# Patient Record
Sex: MALE | Race: WHITE | Employment: OTHER | URBAN - METROPOLITAN AREA
[De-identification: names, ages, dates, MRNs, and addresses within clinical notes are randomized per-mention and may not be internally consistent; named-entity substitution may affect disease eponyms.]

---

## 2020-08-01 ENCOUNTER — HOSPITAL ENCOUNTER (INPATIENT)
Age: 85
LOS: 11 days | Discharge: NURSING, REHAB OR LONG-TERM CARE | DRG: 871 | End: 2020-08-13
Attending: INTERNAL MEDICINE | Admitting: INTERNAL MEDICINE
Payer: MEDICARE

## 2020-08-01 PROCEDURE — 99285 EMERGENCY DEPT VISIT HI MDM: CPT

## 2020-08-02 ENCOUNTER — APPOINTMENT (OUTPATIENT)
Dept: CT IMAGING | Age: 85
DRG: 871 | End: 2020-08-02
Payer: MEDICARE

## 2020-08-02 ENCOUNTER — APPOINTMENT (OUTPATIENT)
Dept: GENERAL RADIOLOGY | Age: 85
DRG: 871 | End: 2020-08-02
Payer: MEDICARE

## 2020-08-02 PROBLEM — R06.02 SOB (SHORTNESS OF BREATH): Status: ACTIVE | Noted: 2020-08-02

## 2020-08-02 LAB
ADENOVIRUS DETECTION BY PCR: NOT DETECTED
ALBUMIN SERPL-MCNC: 3.7 GM/DL (ref 3.4–5)
ALBUMIN SERPL-MCNC: 4 GM/DL (ref 3.4–5)
ALP BLD-CCNC: 102 IU/L (ref 40–129)
ALP BLD-CCNC: 109 IU/L (ref 40–128)
ALT SERPL-CCNC: 26 U/L (ref 10–40)
ALT SERPL-CCNC: 29 U/L (ref 10–40)
AMMONIA: 43 UMOL/L (ref 16–60)
ANION GAP SERPL CALCULATED.3IONS-SCNC: 15 MMOL/L (ref 4–16)
ANION GAP SERPL CALCULATED.3IONS-SCNC: 18 MMOL/L (ref 4–16)
APTT: 26.6 SECONDS (ref 25.1–37.1)
AST SERPL-CCNC: 58 IU/L (ref 15–37)
AST SERPL-CCNC: 84 IU/L (ref 15–37)
ATYPICAL LYMPHOCYTE ABSOLUTE COUNT: ABNORMAL
BACTERIA: NEGATIVE /HPF
BANDED NEUTROPHILS ABSOLUTE COUNT: 0.78 K/CU MM
BANDED NEUTROPHILS RELATIVE PERCENT: 4 % (ref 5–11)
BASOPHILS ABSOLUTE: 0 K/CU MM
BASOPHILS RELATIVE PERCENT: 0.2 % (ref 0–1)
BILIRUB SERPL-MCNC: 1.7 MG/DL (ref 0–1)
BILIRUB SERPL-MCNC: 2.1 MG/DL (ref 0–1)
BILIRUBIN URINE: NEGATIVE MG/DL
BLOOD, URINE: ABNORMAL
BORDETELLA PARAPERTUSSIS BY PCR: NOT DETECTED
BORDETELLA PERTUSSIS PCR: NOT DETECTED
BUN BLDV-MCNC: 38 MG/DL (ref 6–23)
BUN BLDV-MCNC: 38 MG/DL (ref 6–23)
CALCIUM SERPL-MCNC: 9.2 MG/DL (ref 8.3–10.6)
CALCIUM SERPL-MCNC: 9.6 MG/DL (ref 8.3–10.6)
CHLAMYDOPHILA PNEUMONIA PCR: NOT DETECTED
CHLORIDE BLD-SCNC: 101 MMOL/L (ref 99–110)
CHLORIDE BLD-SCNC: 101 MMOL/L (ref 99–110)
CLARITY: ABNORMAL
CO2: 22 MMOL/L (ref 21–32)
CO2: 25 MMOL/L (ref 21–32)
COLOR: ABNORMAL
CORONAVIRUS 229E PCR: NOT DETECTED
CORONAVIRUS HKU1 PCR: NOT DETECTED
CORONAVIRUS NL63 PCR: NOT DETECTED
CORONAVIRUS OC43 PCR: NOT DETECTED
CREAT SERPL-MCNC: 2 MG/DL (ref 0.9–1.3)
CREAT SERPL-MCNC: 2 MG/DL (ref 0.9–1.3)
D DIMER: 2777 NG/ML(DDU)
DIFFERENTIAL TYPE: ABNORMAL
DIFFERENTIAL TYPE: ABNORMAL
EOSINOPHILS ABSOLUTE: 0 K/CU MM
EOSINOPHILS RELATIVE PERCENT: 0 % (ref 0–3)
FERRITIN: 237 NG/ML (ref 30–400)
FIBRINOGEN LEVEL: 837 MG/DL (ref 196.9–442.1)
GFR AFRICAN AMERICAN: 38 ML/MIN/1.73M2
GFR AFRICAN AMERICAN: 38 ML/MIN/1.73M2
GFR NON-AFRICAN AMERICAN: 32 ML/MIN/1.73M2
GFR NON-AFRICAN AMERICAN: 32 ML/MIN/1.73M2
GLUCOSE BLD-MCNC: 113 MG/DL (ref 70–99)
GLUCOSE BLD-MCNC: 123 MG/DL (ref 70–99)
GLUCOSE BLD-MCNC: 125 MG/DL (ref 70–99)
GLUCOSE, URINE: NEGATIVE MG/DL
HCT VFR BLD CALC: 47.2 % (ref 42–52)
HCT VFR BLD CALC: 50.4 % (ref 42–52)
HEMOGLOBIN: 14.8 GM/DL (ref 13.5–18)
HEMOGLOBIN: 15.6 GM/DL (ref 13.5–18)
HIGH SENSITIVE C-REACTIVE PROTEIN: >300 MG/L
HUMAN METAPNEUMOVIRUS PCR: NOT DETECTED
HYPOCHROMIA: ABNORMAL
IMMATURE NEUTROPHIL %: 0.5 % (ref 0–0.43)
INFLUENZA A BY PCR: NOT DETECTED
INFLUENZA A H1 (2009) PCR: NOT DETECTED
INFLUENZA A H1 PANDEMIC PCR: NOT DETECTED
INFLUENZA A H3 PCR: NOT DETECTED
INFLUENZA B BY PCR: NOT DETECTED
INR BLD: 1.11 INDEX
KETONES, URINE: NEGATIVE MG/DL
LACTATE DEHYDROGENASE: 273 IU/L (ref 120–246)
LACTATE: 3.1 MMOL/L (ref 0.4–2)
LACTIC ACID, SEPSIS: 2.2 MMOL/L (ref 0.5–1.9)
LEGIONELLA URINARY AG: NEGATIVE
LEUKOCYTE ESTERASE, URINE: NEGATIVE
LIPASE: 15 IU/L (ref 13–60)
LYMPHOCYTES ABSOLUTE: 0.7 K/CU MM
LYMPHOCYTES ABSOLUTE: 1.4 K/CU MM
LYMPHOCYTES RELATIVE PERCENT: 4 % (ref 24–44)
LYMPHOCYTES RELATIVE PERCENT: 7 % (ref 24–44)
MCH RBC QN AUTO: 28.1 PG (ref 27–31)
MCH RBC QN AUTO: 28.4 PG (ref 27–31)
MCHC RBC AUTO-ENTMCNC: 31 % (ref 32–36)
MCHC RBC AUTO-ENTMCNC: 31.4 % (ref 32–36)
MCV RBC AUTO: 90.6 FL (ref 78–100)
MCV RBC AUTO: 90.8 FL (ref 78–100)
MONOCYTES ABSOLUTE: 1.8 K/CU MM
MONOCYTES ABSOLUTE: 1.9 K/CU MM
MONOCYTES RELATIVE PERCENT: 10.7 % (ref 0–4)
MONOCYTES RELATIVE PERCENT: 9 % (ref 0–4)
MUCUS: ABNORMAL HPF
MYCOPLASMA PNEUMONIAE PCR: NOT DETECTED
NITRITE URINE, QUANTITATIVE: NEGATIVE
NUCLEATED RBC %: 0 %
PARAINFLUENZA 1 PCR: NOT DETECTED
PARAINFLUENZA 2 PCR: NOT DETECTED
PARAINFLUENZA 3 PCR: NOT DETECTED
PARAINFLUENZA 4 PCR: NOT DETECTED
PDW BLD-RTO: 14.7 % (ref 11.7–14.9)
PDW BLD-RTO: 14.8 % (ref 11.7–14.9)
PH, URINE: 5 (ref 5–8)
PLATELET # BLD: 162 K/CU MM (ref 140–440)
PLATELET # BLD: 164 K/CU MM (ref 140–440)
PMV BLD AUTO: 10.6 FL (ref 7.5–11.1)
PMV BLD AUTO: 10.7 FL (ref 7.5–11.1)
POTASSIUM SERPL-SCNC: 4.3 MMOL/L (ref 3.5–5.1)
POTASSIUM SERPL-SCNC: 4.7 MMOL/L (ref 3.5–5.1)
PRO-BNP: 542.4 PG/ML
PROCALCITONIN: 1.3
PROTEIN UA: 30 MG/DL
PROTHROMBIN TIME: 13.4 SECONDS (ref 11.7–14.5)
RBC # BLD: 5.21 M/CU MM (ref 4.6–6.2)
RBC # BLD: 5.55 M/CU MM (ref 4.6–6.2)
RBC URINE: 1 /HPF (ref 0–3)
RHINOVIRUS ENTEROVIRUS PCR: NOT DETECTED
RSV PCR: NOT DETECTED
SEGMENTED NEUTROPHILS ABSOLUTE COUNT: 15.1 K/CU MM
SEGMENTED NEUTROPHILS ABSOLUTE COUNT: 15.5 K/CU MM
SEGMENTED NEUTROPHILS RELATIVE PERCENT: 80 % (ref 36–66)
SEGMENTED NEUTROPHILS RELATIVE PERCENT: 84.6 % (ref 36–66)
SODIUM BLD-SCNC: 141 MMOL/L (ref 135–145)
SODIUM BLD-SCNC: 141 MMOL/L (ref 135–145)
SPECIFIC GRAVITY UA: 1.02 (ref 1–1.03)
SPHEROCYTES: ABNORMAL
STREP PNEUMONIAE ANTIGEN: NORMAL
TOTAL CK: 1507 IU/L (ref 38–174)
TOTAL IMMATURE NEUTOROPHIL: 0.09 K/CU MM
TOTAL NUCLEATED RBC: 0 K/CU MM
TOTAL PROTEIN: 8 GM/DL (ref 6.4–8.2)
TOTAL PROTEIN: 8.3 GM/DL (ref 6.4–8.2)
TRICHOMONAS: ABNORMAL /HPF
TROPONIN T: <0.01 NG/ML
TROPONIN T: <0.01 NG/ML
UROBILINOGEN, URINE: 2 MG/DL (ref 0.2–1)
WBC # BLD: 17.9 K/CU MM (ref 4–10.5)
WBC # BLD: 19.5 K/CU MM (ref 4–10.5)
WBC UA: 3 /HPF (ref 0–2)
YEAST: ABNORMAL /HPF

## 2020-08-02 PROCEDURE — 80053 COMPREHEN METABOLIC PANEL: CPT

## 2020-08-02 PROCEDURE — 84484 ASSAY OF TROPONIN QUANT: CPT

## 2020-08-02 PROCEDURE — 70450 CT HEAD/BRAIN W/O DYE: CPT

## 2020-08-02 PROCEDURE — 82962 GLUCOSE BLOOD TEST: CPT

## 2020-08-02 PROCEDURE — 87486 CHLMYD PNEUM DNA AMP PROBE: CPT

## 2020-08-02 PROCEDURE — 6360000002 HC RX W HCPCS: Performed by: INTERNAL MEDICINE

## 2020-08-02 PROCEDURE — 87899 AGENT NOS ASSAY W/OPTIC: CPT

## 2020-08-02 PROCEDURE — 96365 THER/PROPH/DIAG IV INF INIT: CPT

## 2020-08-02 PROCEDURE — 2580000003 HC RX 258: Performed by: PHYSICIAN ASSISTANT

## 2020-08-02 PROCEDURE — 2580000003 HC RX 258: Performed by: INTERNAL MEDICINE

## 2020-08-02 PROCEDURE — 94761 N-INVAS EAR/PLS OXIMETRY MLT: CPT

## 2020-08-02 PROCEDURE — 84145 PROCALCITONIN (PCT): CPT

## 2020-08-02 PROCEDURE — 2060000000 HC ICU INTERMEDIATE R&B

## 2020-08-02 PROCEDURE — 85384 FIBRINOGEN ACTIVITY: CPT

## 2020-08-02 PROCEDURE — 82140 ASSAY OF AMMONIA: CPT

## 2020-08-02 PROCEDURE — 99222 1ST HOSP IP/OBS MODERATE 55: CPT | Performed by: INTERNAL MEDICINE

## 2020-08-02 PROCEDURE — 85610 PROTHROMBIN TIME: CPT

## 2020-08-02 PROCEDURE — 87040 BLOOD CULTURE FOR BACTERIA: CPT

## 2020-08-02 PROCEDURE — 85025 COMPLETE CBC W/AUTO DIFF WBC: CPT

## 2020-08-02 PROCEDURE — 71045 X-RAY EXAM CHEST 1 VIEW: CPT

## 2020-08-02 PROCEDURE — 82728 ASSAY OF FERRITIN: CPT

## 2020-08-02 PROCEDURE — 82805 BLOOD GASES W/O2 SATURATION: CPT

## 2020-08-02 PROCEDURE — 6360000002 HC RX W HCPCS: Performed by: PHYSICIAN ASSISTANT

## 2020-08-02 PROCEDURE — U0002 COVID-19 LAB TEST NON-CDC: HCPCS

## 2020-08-02 PROCEDURE — 93010 ELECTROCARDIOGRAM REPORT: CPT | Performed by: INTERNAL MEDICINE

## 2020-08-02 PROCEDURE — 96367 TX/PROPH/DG ADDL SEQ IV INF: CPT

## 2020-08-02 PROCEDURE — 85027 COMPLETE CBC AUTOMATED: CPT

## 2020-08-02 PROCEDURE — 83605 ASSAY OF LACTIC ACID: CPT

## 2020-08-02 PROCEDURE — 85730 THROMBOPLASTIN TIME PARTIAL: CPT

## 2020-08-02 PROCEDURE — 87798 DETECT AGENT NOS DNA AMP: CPT

## 2020-08-02 PROCEDURE — 83615 LACTATE (LD) (LDH) ENZYME: CPT

## 2020-08-02 PROCEDURE — 87633 RESP VIRUS 12-25 TARGETS: CPT

## 2020-08-02 PROCEDURE — 85007 BL SMEAR W/DIFF WBC COUNT: CPT

## 2020-08-02 PROCEDURE — 93005 ELECTROCARDIOGRAM TRACING: CPT | Performed by: PHYSICIAN ASSISTANT

## 2020-08-02 PROCEDURE — 83690 ASSAY OF LIPASE: CPT

## 2020-08-02 PROCEDURE — 87581 M.PNEUMON DNA AMP PROBE: CPT

## 2020-08-02 PROCEDURE — 85379 FIBRIN DEGRADATION QUANT: CPT

## 2020-08-02 PROCEDURE — 87449 NOS EACH ORGANISM AG IA: CPT

## 2020-08-02 PROCEDURE — 83880 ASSAY OF NATRIURETIC PEPTIDE: CPT

## 2020-08-02 PROCEDURE — 81001 URINALYSIS AUTO W/SCOPE: CPT

## 2020-08-02 PROCEDURE — 86141 C-REACTIVE PROTEIN HS: CPT

## 2020-08-02 PROCEDURE — 82550 ASSAY OF CK (CPK): CPT

## 2020-08-02 PROCEDURE — 96361 HYDRATE IV INFUSION ADD-ON: CPT

## 2020-08-02 RX ORDER — SODIUM CHLORIDE 0.9 % (FLUSH) 0.9 %
10 SYRINGE (ML) INJECTION PRN
Status: DISCONTINUED | OUTPATIENT
Start: 2020-08-02 | End: 2020-08-13 | Stop reason: HOSPADM

## 2020-08-02 RX ORDER — LORAZEPAM 1 MG/1
2 TABLET ORAL NIGHTLY
Status: ON HOLD | COMMUNITY
End: 2020-08-13 | Stop reason: HOSPADM

## 2020-08-02 RX ORDER — 0.9 % SODIUM CHLORIDE 0.9 %
500 INTRAVENOUS SOLUTION INTRAVENOUS ONCE
Status: COMPLETED | OUTPATIENT
Start: 2020-08-02 | End: 2020-08-02

## 2020-08-02 RX ORDER — SODIUM CHLORIDE 9 MG/ML
INJECTION, SOLUTION INTRAVENOUS CONTINUOUS
Status: DISCONTINUED | OUTPATIENT
Start: 2020-08-02 | End: 2020-08-05

## 2020-08-02 RX ORDER — ONDANSETRON 2 MG/ML
4 INJECTION INTRAMUSCULAR; INTRAVENOUS EVERY 6 HOURS PRN
Status: DISCONTINUED | OUTPATIENT
Start: 2020-08-02 | End: 2020-08-13 | Stop reason: HOSPADM

## 2020-08-02 RX ORDER — ACETAMINOPHEN 650 MG/1
650 SUPPOSITORY RECTAL EVERY 6 HOURS PRN
Status: DISCONTINUED | OUTPATIENT
Start: 2020-08-02 | End: 2020-08-08

## 2020-08-02 RX ORDER — ACETAMINOPHEN 325 MG/1
650 TABLET ORAL EVERY 6 HOURS PRN
Status: DISCONTINUED | OUTPATIENT
Start: 2020-08-02 | End: 2020-08-04

## 2020-08-02 RX ORDER — POLYETHYLENE GLYCOL 3350 17 G/17G
17 POWDER, FOR SOLUTION ORAL DAILY PRN
Status: DISCONTINUED | OUTPATIENT
Start: 2020-08-02 | End: 2020-08-13 | Stop reason: HOSPADM

## 2020-08-02 RX ORDER — SODIUM CHLORIDE 0.9 % (FLUSH) 0.9 %
10 SYRINGE (ML) INJECTION EVERY 12 HOURS SCHEDULED
Status: DISCONTINUED | OUTPATIENT
Start: 2020-08-02 | End: 2020-08-13 | Stop reason: HOSPADM

## 2020-08-02 RX ORDER — LORAZEPAM 1 MG/1
1 TABLET ORAL 2 TIMES DAILY
Status: ON HOLD | COMMUNITY
End: 2020-08-13 | Stop reason: HOSPADM

## 2020-08-02 RX ORDER — ACETAMINOPHEN 650 MG/1
650 SUPPOSITORY RECTAL EVERY 6 HOURS PRN
Status: DISCONTINUED | OUTPATIENT
Start: 2020-08-02 | End: 2020-08-04

## 2020-08-02 RX ORDER — PROMETHAZINE HYDROCHLORIDE 25 MG/1
12.5 TABLET ORAL EVERY 6 HOURS PRN
Status: DISCONTINUED | OUTPATIENT
Start: 2020-08-02 | End: 2020-08-13 | Stop reason: HOSPADM

## 2020-08-02 RX ORDER — ACETAMINOPHEN 325 MG/1
650 TABLET ORAL EVERY 6 HOURS PRN
Status: DISCONTINUED | OUTPATIENT
Start: 2020-08-02 | End: 2020-08-08

## 2020-08-02 RX ADMIN — SODIUM CHLORIDE, PRESERVATIVE FREE 10 ML: 5 INJECTION INTRAVENOUS at 21:00

## 2020-08-02 RX ADMIN — SODIUM CHLORIDE: 9 INJECTION, SOLUTION INTRAVENOUS at 01:32

## 2020-08-02 RX ADMIN — CEFTRIAXONE SODIUM 1 G: 1 INJECTION, POWDER, FOR SOLUTION INTRAMUSCULAR; INTRAVENOUS at 02:49

## 2020-08-02 RX ADMIN — SODIUM CHLORIDE, PRESERVATIVE FREE 10 ML: 5 INJECTION INTRAVENOUS at 09:21

## 2020-08-02 RX ADMIN — DEXTROSE MONOHYDRATE 500 MG: 50 INJECTION, SOLUTION INTRAVENOUS at 02:59

## 2020-08-02 RX ADMIN — SODIUM CHLORIDE 500 ML: 9 INJECTION, SOLUTION INTRAVENOUS at 04:58

## 2020-08-02 RX ADMIN — SODIUM CHLORIDE: 9 INJECTION, SOLUTION INTRAVENOUS at 12:05

## 2020-08-02 RX ADMIN — ENOXAPARIN SODIUM 30 MG: 30 INJECTION SUBCUTANEOUS at 09:21

## 2020-08-02 ASSESSMENT — PAIN SCALES - GENERAL: PAINLEVEL_OUTOF10: 0

## 2020-08-02 NOTE — ED NOTES
Patient's linen and clothing changed due to incontinent urinary episode prior to arrival. Patient's oxygen level down to 69% on room air. Non rebreather mask placed at 15 L/min. Patient's saturation at 100%. Notified Zoraida DIAZ and Dr. Salbador Vale.       Esequiel Pedro RN  08/02/20 4817

## 2020-08-02 NOTE — ED NOTES
Placido Valdez, son, [phone]. Please call with admission info and update on POC.      Helena Gray RN  08/02/20 7933

## 2020-08-02 NOTE — H&P
per session: Not on file    Stress: Not on file   Relationships    Social connections     Talks on phone: Not on file     Gets together: Not on file     Attends Oriental orthodox service: Not on file     Active member of club or organization: Not on file     Attends meetings of clubs or organizations: Not on file     Relationship status: Not on file    Intimate partner violence     Fear of current or ex partner: Not on file     Emotionally abused: Not on file     Physically abused: Not on file     Forced sexual activity: Not on file   Other Topics Concern    Not on file   Social History Narrative    Not on file       MEDICATIONS   Medications Prior to Admission  Not in a hospital admission. Current Medications  Current Facility-Administered Medications   Medication Dose Route Frequency Provider Last Rate Last Dose    0.9 % sodium chloride infusion   Intravenous Continuous Sara Feliciano PA-C 100 mL/hr at 08/02/20 0132      0.9 % sodium chloride bolus  500 mL Intravenous Once Sara Feliciano PA-C         Current Outpatient Medications   Medication Sig Dispense Refill    cloNIDine (CATAPRES) 0.1 MG tablet Take 1 tablet by mouth every 6 hours as needed (for B/P systolic > 749). 60 tablet 3    pantoprazole (PROTONIX) 40 MG tablet Take 1 tablet by mouth every morning (before breakfast). 30 tablet 3    QUEtiapine (SEROQUEL) 100 MG tablet Take 100 mg by mouth 2 times daily.  ibuprofen (ADVIL;MOTRIN) 400 MG tablet Take 400 mg by mouth 2 times daily as needed for Pain.  metoprolol (TOPROL-XL) 25 MG XL tablet Take 25 mg by mouth daily.  mirtazapine (REMERON) 45 MG tablet Take 45 mg by mouth nightly. Allergies  No Known Allergies    REVIEW OF SYSTEMS   Within above limitations. 14 point review of systems reviewed. Pertinent positive or negative as per HPI or otherwise negative per 14 point systems review.       PHYSICAL EXAM     Wt Readings from Last 3 Encounters:   08/02/20 180 lb

## 2020-08-02 NOTE — ED NOTES
Report given to [employer]. Patient is dry and in a gown. Patient has a crowley.      Palomo Henry RN  08/02/20 7417

## 2020-08-02 NOTE — PROGRESS NOTES
Pt arrived to unit escorted by a nurse. Pt transported to room and skin assessment complete with maría DORANTES. Pt has unopened blisters on buttock with and pink and purple unstagable wound to bilateral inner buttocks. There is a  Healed surgigal scar to right hip and scattered bruising noted to upper body.

## 2020-08-02 NOTE — ED NOTES
Per Kulwant DIAZ, wean patient off non rebreather. Patient placed on 6 L/min NC. Oxygen saturation 100% at this time.      Nikkie Lake RN  08/02/20 9739

## 2020-08-02 NOTE — CONSULTS
Pulmonary Consult Note      Reason for Consult: SOB  Requesting Physician: Dr. Natividad Meyers  Subjective:   CHIEF COMPLAINT :SOB    Patient Active Problem List    Diagnosis Date Noted    SOB (shortness of breath) 23/72/4081    Metabolic encephalopathy 97/77/2030    Acute renal failure ([facility] [address] 12/03/2014    Pneumonia due to organism 12/02/2014     Overview Note:     Replacing Inactive Diagnoses      ARF (acute respiratory failure) ([facility] [address] 12/02/2014    Essential hypertension, benign 12/02/2014    Depressive disorder, not elsewhere classified 12/02/2014        HPI:                The patient is a 80 y.o. male with significant past medical history of Anxiety, Depression, HTN  presents with complaints of SOB x 1 day. He was brought to the ED and had a CXR which showed suspected RUL Pneumonia. He had a CT head for AMS and it showed no acute abnormality. He is getting abx and nebs. He has a remote h/o smoking    Past Medical History:      Diagnosis Date    Anxiety     Depression     Hypertension     Nerves       Past Surgical History:        Procedure Laterality Date    JOINT REPLACEMENT      right hip     Current Medications:     sodium chloride flush  10 mL Intravenous 2 times per day    enoxaparin  30 mg Subcutaneous Daily    [START ON 8/3/2020] cefTRIAXone (ROCEPHIN) IV  1 g Intravenous Q24H    [START ON 8/3/2020] azithromycin  500 mg Intravenous Q24H     Allergies:    Social History:    TOBACCO:   reports that he quit smoking about 45 years ago. His smokeless tobacco use includes chew. ETOH:   reports no history of alcohol use. Patient currently lives independently    Family History:       Problem Relation Age of Onset    Cancer Mother     Cancer Father     Cancer Sister        REVIEW OF SYSTEMS:    CONSTITUTIONAL:  negative for fevers, chills, diaphoresis, activity change, appetite change, fatigue, night sweats and unexpected weight change.    EYES:  negative for blurred vision, eye discharge, visual disturbance and icterus  HEENT:  negative for hearing loss, tinnitus, ear drainage, sinus pressure, nasal congestion, epistaxis and snoring  RESPIRATORY:  See HPI  CARDIOVASCULAR:  negative for chest pain, palpitations, exertional chest pressure/discomfort, edema, syncope  GASTROINTESTINAL:  negative for nausea, vomiting, diarrhea, constipation, blood in stool and abdominal pain  GENITOURINARY:  negative for frequency, dysuria, urinary incontinence, decreased urine volume, and hematuria  HEMATOLOGIC/LYMPHATIC:  negative for easy bruising, bleeding and lymphadenopathy  ALLERGIC/IMMUNOLOGIC:  negative for recurrent infections, angioedema, anaphylaxis and drug reactions  ENDOCRINE:  negative for weight changes and diabetic symptoms including polyuria, polydipsia and polyphagia  MUSCULOSKELETAL:  negative for  pain, joint swelling, decreased range of motion and muscle weakness  NEUROLOGICAL:  negative for headaches, slurred speech, unilateral weakness  PSYCHIATRIC/BEHAVIORAL: negative for hallucinations, behavioral problems, confusion and agitation. Objective:   PHYSICAL EXAM:      VITALS:  BP (!) 124/58   Pulse 71   Temp 99 °F (37.2 °C) (Oral)   Resp 24   Ht 5' 8\" (1.727 m)   Wt 180 lb (81.6 kg)   SpO2 94%   BMI 27.37 kg/m²   24HR INTAKE/OUTPUT:      Intake/Output Summary (Last 24 hours) at 2020 1422  Last data filed at 2020 0612  Gross per 24 hour   Intake --   Output 300 ml   Net -300 ml     CURRENT PULSE OXIMETRY:  SpO2: 94 %  24HR PULSE OXIMETRY RANGE:  SpO2  Av.2 %  Min: 77 %  Max: 100 %    CONSTITUTIONAL:  awake, alert, cooperative, no apparent distress, and appears stated age  NECK:  Supple, symmetrical, trachea midline, no adenopathy, thyroid symmetric, not enlarged and no tenderness, skin normal  LUNGS:  Occasional basal crackles  CARDIOVASCULAR:  normal S1 and S2, no edema and no JVD  ABDOMEN:  normal bowel sounds, non-distended and no masses palpated, and no tenderness to palpation. No hepatospleenomegaly  LYMPHADENOPATHY:  no axillary or supraclavicular adenopathy. No cervical adnenopathy  PSYCHIATRIC: Oriented to person place and time. No obvious depression or anxiety. MUSCULOSKELETAL: No obvious misalignment or effusion of the joints. No clubbing, cyanosis of the digits. SKIN:  normal skin color, texture, turgor and no redness, warmth, or swelling. No palpable nodules    DATA:    Old records have been reviewed  CBC with Differential:    Lab Results   Component Value Date    WBC 17.9 08/02/2020    RBC 5.21 08/02/2020    HGB 14.8 08/02/2020    HCT 47.2 08/02/2020     08/02/2020    MCV 90.6 08/02/2020    MCH 28.4 08/02/2020    MCHC 31.4 08/02/2020    RDW 14.8 08/02/2020    SEGSPCT 84.6 08/02/2020    BANDSPCT 4 08/02/2020    LYMPHOPCT 4.0 08/02/2020    MONOPCT 10.7 08/02/2020    EOSPCT 5.3 11/11/2011    BASOPCT 0.2 08/02/2020    MONOSABS 1.9 08/02/2020    LYMPHSABS 0.7 08/02/2020    EOSABS 0.0 08/02/2020    BASOSABS 0.0 08/02/2020    DIFFTYPE AUTOMATED DIFFERENTIAL 08/02/2020     BMP:    Lab Results   Component Value Date     08/02/2020    K 4.3 08/02/2020     08/02/2020    CO2 22 08/02/2020    BUN 38 08/02/2020    CREATININE 2.0 08/02/2020    CALCIUM 9.2 08/02/2020    GFRAA 38 08/02/2020    LABGLOM 32 08/02/2020    GLUCOSE 125 08/02/2020     Hepatic Function Panel:    Lab Results   Component Value Date    ALKPHOS 102 08/02/2020    ALT 29 08/02/2020    AST 84 08/02/2020    PROT 8.0 08/02/2020    PROT 6.4 10/24/2012    BILITOT 1.7 08/02/2020    BILIDIR 0.1 12/12/2012    IBILI 0.2 12/12/2012     ABG:    Lab Results   Component Value Date    WEW3BZP 21.0 12/05/2014    NWK9VFJ 38.0 12/05/2014    PO2ART 104 12/05/2014       Cultures:   Pending    Radiology Review:        1. Questionable right upper lobe airspace opacity could be related to    summation of overlying tissues but could also be seen with pneumonia. 2. Bibasilar atelectasis and/or scarring. 3. Mild cardiomegaly. Assessment/Plan       Patient Active Problem List    Diagnosis Date Noted    SOB (shortness of breath) 58/46/8313    Metabolic encephalopathy 44/86/3003    Acute renal failure ([facility] [address] 12/03/2014    Pneumonia due to organism 12/02/2014     Overview Note:     Replacing Inactive Diagnoses      ARF (acute respiratory failure) ([facility] [address] 12/02/2014    Essential hypertension, benign 12/02/2014    Depressive disorder, not elsewhere classified 12/02/2014     RONALDO  ? RUL Pneumonia  Leukocytosis  R/o COVID      PLAN  1. Abx  2. Await COVID  3. Inhalers  4. F/u C&S  5. DVT and GI Prophylaxis  6.  C/w present management      Electronically signed by Karely Hwang MD on 8/2/2020 at 2:22 PM

## 2020-08-02 NOTE — ED PROVIDER NOTES
Triage Chief Complaint:   Fatigue and Cough    [place]:  Today in the ED I had the pleasure of caring for Luz Maria Webb who is a 80 y.o. male that presents today to the emergency department brought in by EMS. Context is patient was found sitting in his chair soiled in his pants. Hypoxic upon arrival.  Very lethargic. Patient himself denies any pain. However then when I asked him if he has any pain he points to his epigastric region. Patient does have a past medical history of hypertension, pneumonia, renal failure acute. Metabolic encephalopathy in the past.   Family came over and noticed how weak he was and called ems to check on him, they had noticed he hadnt taken his meds.    ROS:  REVIEW OF SYSTEMS    At least 10 systems reviewed      All other review of systems are negative  See HPI and nursing notes for additional information       Past Medical History:   Diagnosis Date    Anxiety     Depression     Hypertension     Nerves      Past Surgical History:   Procedure Laterality Date    JOINT REPLACEMENT      right hip     Family History   Problem Relation Age of Onset    Cancer Mother     Cancer Father     Cancer Sister      Social History     Socioeconomic History    Marital status:      Spouse name: Not on file    Number of children: 3    Years of education: Not on file    Highest education level: Not on file   Occupational History    Not on file   Social Needs    Financial resource strain: Not on file    Food insecurity     Worry: Not on file     Inability: Not on file    Transportation needs     Medical: Not on file     Non-medical: Not on file   Tobacco Use    Smoking status: Former Smoker     Last attempt to quit: 1974     Years since quittin.6    Smokeless tobacco: Current User     Types: Chew   Substance and Sexual Activity    Alcohol use: No     Comment: used to drimk on weekends stopped 2 years ago    Drug use: No    Sexual activity: Not on file   Lifestyle    Physical activity     Days per week: Not on file     Minutes per session: Not on file    Stress: Not on file   Relationships    Social connections     Talks on phone: Not on file     Gets together: Not on file     Attends Anabaptism service: Not on file     Active member of club or organization: Not on file     Attends meetings of clubs or organizations: Not on file     Relationship status: Not on file    Intimate partner violence     Fear of current or ex partner: Not on file     Emotionally abused: Not on file     Physically abused: Not on file     Forced sexual activity: Not on file   Other Topics Concern    Not on file   Social History Narrative    Not on file     Current Facility-Administered Medications   Medication Dose Route Frequency Provider Last Rate Last Dose    0.9 % sodium chloride infusion   Intravenous Continuous Tristan Andrade PA-C 100 mL/hr at 08/02/20 0132      0.9 % sodium chloride bolus  500 mL Intravenous Once Tristan Andrade PA-C         Current Outpatient Medications   Medication Sig Dispense Refill    cloNIDine (CATAPRES) 0.1 MG tablet Take 1 tablet by mouth every 6 hours as needed (for B/P systolic > 052). 60 tablet 3    pantoprazole (PROTONIX) 40 MG tablet Take 1 tablet by mouth every morning (before breakfast). 30 tablet 3    QUEtiapine (SEROQUEL) 100 MG tablet Take 100 mg by mouth 2 times daily.  ibuprofen (ADVIL;MOTRIN) 400 MG tablet Take 400 mg by mouth 2 times daily as needed for Pain.  metoprolol (TOPROL-XL) 25 MG XL tablet Take 25 mg by mouth daily.  mirtazapine (REMERON) 45 MG tablet Take 45 mg by mouth nightly.          No Known Allergies    Nursing Notes Reviewed    Physical Exam:  ED Triage Vitals   Enc Vitals Group      BP 08/02/20 0053 121/76      Pulse 08/02/20 0053 81      Resp 08/02/20 0100 22      Temp 08/02/20 0100 99.2 °F (37.3 °C)      Temp Source 08/02/20 0100 Oral      SpO2 08/02/20 0053 (!) 77 %      Weight 08/02/20 0100 180 lb (81.6 kg)      Height 08/02/20 0100 5' 8\" (1.727 m)      Head Circumference --       Peak Flow --       Pain Score --       Pain Loc --       Pain Edu? --       Excl. in [address]? --      General :Patient is awake alert oriented person place not time. Toxic appearing elderly male. Appears weak and lethargic  HEENT: Pupils are equally round and reactive to light extraocular motors are intact conjunctivae clear sclerae white there is no injection no icterus. Nose without any rhinorrhea or epistaxis. Oral mucosa is moist no exudate buccal mucosa shows no ulcerations. Uvula is midline    Neck: Neck is supple full range of motion trachea midline thyroid nonpalpable  Cardiac: Heart regular rate rhythm no murmurs rubs clicks or gallops  Lungs: Lungs are clear to auscultation there is no wheezing rhonchi or rales. There is no use of accessory muscles no nasal flaring identified. Chest wall: There is no tenderness to palpation over the chest wall or over ribs  Abdomen: Abdomen is soft nontender nondistended. There is no firm or pulsatile masses no rebound rigidity or guarding negative Crawford's negative McBurney, no peritoneal signs  Suprapubic:  there is no tenderness to palpation over the external bladder   Musculoskeletal: 3 out of 5 strength in all 4 extremities full flexion extension abduction and adduction supination pronation of all extremities and all digits. No obvious muscle atrophy is noted. No focal muscle deficits are appreciated  Dermatology: Skin is warm and dry there is no obvious abscesses lacerations   Closed wounds on buttocks, reddened with large blisters. Psych: Mentation is grossly normal cognition is grossly normal. Affect is appropriate  Neuro:  Motor intact sensory intact cranial nerves II through XII are intact level of consciousness is normal cerebellar function is normal reflexes are grossly normal. No evidence of incontinence or loss of bowel or bladder no saddle anesthesia noted Lymphatic: There is no CK 1,507 (H) 38 - 174 IU/L   Ammonia Level   Result Value Ref Range    Ammonia 43 16 - 60 UMOL/L   POCT Glucose   Result Value Ref Range    POC Glucose 113 (H) 70 - 99 MG/DL   EKG 12 Lead   Result Value Ref Range    Ventricular Rate 88 BPM    Atrial Rate 88 BPM    P-R Interval 174 ms    QRS Duration 134 ms    Q-T Interval 366 ms    QTc Calculation (Bazett) 442 ms    P Axis 39 degrees    R Axis -67 degrees    T Axis 38 degrees    Diagnosis       Normal sinus rhythm  Right bundle branch block  Left anterior fascicular block   Bifascicular block   Septal infarct , age undetermined  Abnormal ECG  No previous ECGs available        Radiographs (if obtained):  [] The following radiograph was interpreted by myself in the absence of a radiologist:   [] Radiologist's Report Reviewed:  CT HEAD WO CONTRAST   Final Result   1. Mild cerebral white matter chronic microvascular ischemic disease. 2. Moderate diffuse cerebral atrophy. XR CHEST PORTABLE   Final Result   1. Questionable right upper lobe airspace opacity could be related to   summation of overlying tissues but could also be seen with pneumonia. 2. Bibasilar atelectasis and/or scarring. 3. Mild cardiomegaly. EKG (if obtained):   Please See Note of attending physician for EKG interpretation. Chart review shows recent radiograph(s):  Xr Chest Portable    Result Date: 8/2/2020  EXAMINATION: ONE XRAY VIEW OF THE CHEST 8/2/2020 12:10 am COMPARISON: Chest radiograph 12/08/2014 HISTORY: ORDERING SYSTEM PROVIDED HISTORY: chest pain TECHNOLOGIST PROVIDED HISTORY: Reason for exam:->chest pain Reason for Exam: chest pain Acuity: Acute Type of Exam: Initial FINDINGS: Low lung volumes slightly kyphotic positioning. Possible right upper lobe airspace opacity. Bibasilar airspace opacities. No definite findings of pneumothorax or pleural effusion. Unchanged prominence of the mediastinal contour likely due to aortic tortuosity.   Mildly prominent hilar and cardiac contours. Atherosclerotic calcification in the aorta. No obvious acute fracture. Joints maintain anatomic alignment. 1. Questionable right upper lobe airspace opacity could be related to summation of overlying tissues but could also be seen with pneumonia. 2. Bibasilar atelectasis and/or scarring. 3. Mild cardiomegaly. MDM:   Patient presents today to the ED with generalized weakness fatigue here in the ED. Noted to be sepsis positive. With pneumonia noted on chest x-ray leukocytosis and lactic acidosis. IV antibiotics and IV fluids were provided. Patient denies any pain. He also is also noted to have stage I pressure ulcers on his rear end  On-call  Was consulted regarding patient. After thorough discussion regarding patient's history, physical exam.  laboratory values, radiographic evidence (if applicable  theymyself as well as my attending physician agreed Given the patient's presenting concerns, medical history and clinical findings, the patient will be admitted at this time to undergo further evaluation and disposition. . During patient's entire stay in the ED patient remained stable and comfortable. Analgesia is well-controlled. Patient will be admitted for all information regarding ongoing management and care of patient please see note of Admitting physician. All EKG interpretations are performed by Attending physician      Total critical care time today provided was at least  30 minutes. This excludes seperately billable procedure. Critical care time provided for sepsis that required close evaluation and/or intervention with concern for patient decompensation. I independently managed patient today in the ED        /68   Pulse 83   Temp 99.2 °F (37.3 °C) (Oral)   Resp 20   Ht 5' 8\" (1.727 m)   Wt 180 lb (81.6 kg)   SpO2 96%   BMI 27.37 kg/m²       Clinical Impression:  1. Pneumonia due to organism    2. Septicemia ([address]    3.  General weakness Disposition referral (if applicable):  No follow-up provider specified. Disposition medications (if applicable):  New Prescriptions    No medications on file         Comment: Please note this report has been produced using speech recognition software and may contain errors related to that system including errors in grammar, punctuation, and spelling, as well as words and phrases that may be inappropriate. If there are any questions or concerns please feel free to contact the dictating provider for clarification.       José Antonio Mays, [address] [place] [address]  08/02/20 9939

## 2020-08-02 NOTE — CARE COORDINATION
Pt admitted to [facility] with PN. Pt is currently undergoing testing for covid and results are pending. CM contacted pt's dgt to initiate/gather information for discharge planning assessment. Pt lives alone in a 1 story home with laundry on the main floor. Pt does not drive. Pt's dgt is a nurse and she sets up pt's weekly pill box. Pt's son comes over in the evening to be sure pt is taking his daily meds. Pt's son has been doing the grocery shopping for pt. Pt does his own laundry and makes his own breakfast/meals. Pt has a cane and walker. Pt generally uses the cane but had used the walker a day or 2 PTA. Pt has PCP and insurance to assist with cost of Rxs. Cm discussed passport with dgt and provided her the ph# to call if she is interested or would like to have pt evaluated for passport. Dgt is concerned that pt was so weak prior to admission and realizes that pt may need to consider short term SNF for rehab at discharge pending progress. Dgt states that if SNF is needed she would likely consider 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 When medically appropriate anticipate need for PT/OT evals to assist in determining appropriate disposition at discharge. Cm to follow.

## 2020-08-02 NOTE — ED NOTES
Closed wounds on buttocks, reddened with large blisters. Rebbecca Cockayne PA aware.      Vick Costello RN  08/02/20 2370

## 2020-08-02 NOTE — PROGRESS NOTES
Hospitalist Progress Note      Name:  Celina Azar /Age/Sex: 1932  (80 y.o. male)   MRN & CSN:  [id number] & [id number] Admission Date/Time: 2020 11:57 PM   Location:  -A PCP: Dm Parkinson MD         Hospital Day: 2    Assessment and Plan:   Celina Azar is a 80 y.o.  male  who presents with:    · AMS in setting of Cough and SOB likely d/t Community-acquired pneumonia  - CXR with infiltrates  -COVID-19 testing and related labs: CRP, d-dimer, ferritin, fibrinogen, LDH, lactate, procalcitonin, viral panel  - ceftriaxone and azithromycin   - sputum cuture, , legionella antigen, strep pneumonia antigen  - PRN O2 via NC  - trend procal, lactate and WBC  - f/u UA, consider broadening ABx  - trend lactate     · Pressure ulcer- wound care  · HTN, hold, PRN labetalol      Diet DIET GENERAL;   DVT Prophylaxis [] Lovenox, []  Heparin, [] SCDs, []No VTE prophylaxis, patient ambulating   GI Prophylaxis [] PPI, [] H2 Blocker, [] No GI prophylaxis, patient is receiving diet/Tube Feeds   Code Status Full Code   Disposition Patient requires continued admission due to covid 23   MDM [] Low, [x] Moderate,[]  High  Patient's risk as above due to     [] One or more chronic illnesses with mild exacerbation progression    [x] Two or more stable chronic illnesses    [] Undiagnosed new problem with uncertain prognosis    [] Elective major surgery    []Prescription drug management     History of Present Illness:     Pt S&E. Laying in bed in NAD  Getting a new IV  No problems overnight     10-14 point ROS reviewed negative, unless as noted above    Objective: Intake/Output Summary (Last 24 hours) at 2020 1208  Last data filed at 2020 1231  Gross per 24 hour   Intake --   Output 300 ml   Net -300 ml      Vitals:   Vitals:    20 1024   BP:    Pulse: 64   Resp:    Temp:    SpO2:      Physical Exam: *   GEN Awake male, laying in bed in no apparent distress. Appears given age.   EYES Pupils

## 2020-08-02 NOTE — ED NOTES
Bed: H-01  Expected date:   Expected time:   Means of arrival:   Comments:  jesús Brown RN  08/01/20 9522

## 2020-08-03 LAB
ALBUMIN SERPL-MCNC: 3 GM/DL (ref 3.4–5)
ALP BLD-CCNC: 88 IU/L (ref 40–128)
ALT SERPL-CCNC: 25 U/L (ref 10–40)
ANION GAP SERPL CALCULATED.3IONS-SCNC: 13 MMOL/L (ref 4–16)
APTT: 27.7 SECONDS (ref 25.1–37.1)
AST SERPL-CCNC: 50 IU/L (ref 15–37)
BASOPHILS ABSOLUTE: 0 K/CU MM
BASOPHILS RELATIVE PERCENT: 0.2 % (ref 0–1)
BILIRUB SERPL-MCNC: 0.6 MG/DL (ref 0–1)
BUN BLDV-MCNC: 43 MG/DL (ref 6–23)
CALCIUM SERPL-MCNC: 7.9 MG/DL (ref 8.3–10.6)
CHLORIDE BLD-SCNC: 103 MMOL/L (ref 99–110)
CO2: 25 MMOL/L (ref 21–32)
CREAT SERPL-MCNC: 1.8 MG/DL (ref 0.9–1.3)
D DIMER: 2741 NG/ML(DDU)
DIFFERENTIAL TYPE: ABNORMAL
EOSINOPHILS ABSOLUTE: 0 K/CU MM
EOSINOPHILS RELATIVE PERCENT: 0 % (ref 0–3)
FIBRINOGEN LEVEL: 813 MG/DL (ref 196.9–442.1)
GFR AFRICAN AMERICAN: 43 ML/MIN/1.73M2
GFR NON-AFRICAN AMERICAN: 36 ML/MIN/1.73M2
GLUCOSE BLD-MCNC: 202 MG/DL (ref 70–99)
HCT VFR BLD CALC: 36.6 % (ref 42–52)
HEMOGLOBIN: 11.3 GM/DL (ref 13.5–18)
IMMATURE NEUTROPHIL %: 0.6 % (ref 0–0.43)
INR BLD: 1.18 INDEX
LYMPHOCYTES ABSOLUTE: 0.8 K/CU MM
LYMPHOCYTES RELATIVE PERCENT: 4.8 % (ref 24–44)
MCH RBC QN AUTO: 28.2 PG (ref 27–31)
MCHC RBC AUTO-ENTMCNC: 30.9 % (ref 32–36)
MCV RBC AUTO: 91.3 FL (ref 78–100)
MONOCYTES ABSOLUTE: 1.2 K/CU MM
MONOCYTES RELATIVE PERCENT: 7.4 % (ref 0–4)
NUCLEATED RBC %: 0 %
PDW BLD-RTO: 14.8 % (ref 11.7–14.9)
PLATELET # BLD: 143 K/CU MM (ref 140–440)
PMV BLD AUTO: 10.6 FL (ref 7.5–11.1)
POTASSIUM SERPL-SCNC: 4.2 MMOL/L (ref 3.5–5.1)
PROTHROMBIN TIME: 14.3 SECONDS (ref 11.7–14.5)
RBC # BLD: 4.01 M/CU MM (ref 4.6–6.2)
SARS-COV-2: NOT DETECTED
SEGMENTED NEUTROPHILS ABSOLUTE COUNT: 13.8 K/CU MM
SEGMENTED NEUTROPHILS RELATIVE PERCENT: 87 % (ref 36–66)
SODIUM BLD-SCNC: 141 MMOL/L (ref 135–145)
SOURCE: NORMAL
TOTAL IMMATURE NEUTOROPHIL: 0.09 K/CU MM
TOTAL NUCLEATED RBC: 0 K/CU MM
TOTAL PROTEIN: 5.4 GM/DL (ref 6.4–8.2)
WBC # BLD: 15.9 K/CU MM (ref 4–10.5)

## 2020-08-03 PROCEDURE — 6370000000 HC RX 637 (ALT 250 FOR IP): Performed by: INTERNAL MEDICINE

## 2020-08-03 PROCEDURE — 6360000002 HC RX W HCPCS: Performed by: INTERNAL MEDICINE

## 2020-08-03 PROCEDURE — 85730 THROMBOPLASTIN TIME PARTIAL: CPT

## 2020-08-03 PROCEDURE — 94640 AIRWAY INHALATION TREATMENT: CPT

## 2020-08-03 PROCEDURE — 2060000000 HC ICU INTERMEDIATE R&B

## 2020-08-03 PROCEDURE — 2580000003 HC RX 258: Performed by: INTERNAL MEDICINE

## 2020-08-03 PROCEDURE — 94761 N-INVAS EAR/PLS OXIMETRY MLT: CPT

## 2020-08-03 PROCEDURE — 80053 COMPREHEN METABOLIC PANEL: CPT

## 2020-08-03 PROCEDURE — 85379 FIBRIN DEGRADATION QUANT: CPT

## 2020-08-03 PROCEDURE — 2700000000 HC OXYGEN THERAPY PER DAY

## 2020-08-03 PROCEDURE — 85610 PROTHROMBIN TIME: CPT

## 2020-08-03 PROCEDURE — 85384 FIBRINOGEN ACTIVITY: CPT

## 2020-08-03 PROCEDURE — 2580000003 HC RX 258: Performed by: PHYSICIAN ASSISTANT

## 2020-08-03 PROCEDURE — 85025 COMPLETE CBC W/AUTO DIFF WBC: CPT

## 2020-08-03 RX ORDER — ALBUTEROL SULFATE 90 UG/1
2 AEROSOL, METERED RESPIRATORY (INHALATION) 4 TIMES DAILY
Status: DISCONTINUED | OUTPATIENT
Start: 2020-08-03 | End: 2020-08-13 | Stop reason: HOSPADM

## 2020-08-03 RX ADMIN — CEFTRIAXONE SODIUM 1 G: 1 INJECTION, POWDER, FOR SOLUTION INTRAMUSCULAR; INTRAVENOUS at 02:57

## 2020-08-03 RX ADMIN — ALBUTEROL SULFATE 2 PUFF: 108 AEROSOL, METERED RESPIRATORY (INHALATION) at 19:28

## 2020-08-03 RX ADMIN — ENOXAPARIN SODIUM 30 MG: 30 INJECTION SUBCUTANEOUS at 09:02

## 2020-08-03 RX ADMIN — IPRATROPIUM BROMIDE 2 PUFF: 17 AEROSOL, METERED RESPIRATORY (INHALATION) at 19:28

## 2020-08-03 RX ADMIN — DEXTROSE MONOHYDRATE 500 MG: 50 INJECTION, SOLUTION INTRAVENOUS at 03:03

## 2020-08-03 RX ADMIN — SODIUM CHLORIDE: 9 INJECTION, SOLUTION INTRAVENOUS at 18:14

## 2020-08-03 RX ADMIN — SODIUM CHLORIDE, PRESERVATIVE FREE 10 ML: 5 INJECTION INTRAVENOUS at 21:47

## 2020-08-03 RX ADMIN — ACETAMINOPHEN 650 MG: 325 TABLET ORAL at 18:14

## 2020-08-03 RX ADMIN — ACETAMINOPHEN 650 MG: 325 TABLET ORAL at 09:06

## 2020-08-03 RX ADMIN — SODIUM CHLORIDE, PRESERVATIVE FREE 10 ML: 5 INJECTION INTRAVENOUS at 09:02

## 2020-08-03 RX ADMIN — SODIUM CHLORIDE: 9 INJECTION, SOLUTION INTRAVENOUS at 09:06

## 2020-08-03 ASSESSMENT — PAIN SCALES - GENERAL
PAINLEVEL_OUTOF10: 3
PAINLEVEL_OUTOF10: 0
PAINLEVEL_OUTOF10: 0

## 2020-08-03 NOTE — PROGRESS NOTES
Hospitalist Progress Note      Name:  Deloris Chávez /Age/Sex: 1932  (80 y.o. male)   MRN & CSN:  [id number] & [id number] Admission Date/Time: 2020 11:57 PM   Location:  -A PCP: Ranjit Morales MD         Hospital Day: 3    Assessment and Plan:   Deloris Chávez is a 80 y.o.  male  who presents with:    · AMS in setting of Cough and SOB likely d/t Community-acquired pneumonia  - CXR with infiltrates  -COVID-19 testing and related labs: CRP, d-dimer, ferritin, fibrinogen, LDH, lactate, procalcitonin, viral panel  - ceftriaxone and azithromycin   - sputum cuture, , legionella antigen, strep pneumonia antigen  - PRN O2 via NC  - trend procal, lactate and WBC  - f/u UA, consider broadening ABx  - trend lactate  - consider palliative care after transfer out of covid unit      · Pressure ulcer- wound care  · HTN, hold, PRN labetalol      Diet DIET GENERAL;   DVT Prophylaxis [] Lovenox, []  Heparin, [] SCDs, []No VTE prophylaxis, patient ambulating   GI Prophylaxis [] PPI, [] H2 Blocker, [] No GI prophylaxis, patient is receiving diet/Tube Feeds   Code Status Full Code   Disposition Patient requires continued admission due to covid 19    Will need pt/ot for placement assistance. Would recommend dc him with OP palliative care/hospice care. Will need to discuss with family    MDM [] Low, [x] Moderate,[]  High  Patient's risk as above due to     [] One or more chronic illnesses with mild exacerbation progression    [x] Two or more stable chronic illnesses    [] Undiagnosed new problem with uncertain prognosis    [] Elective major surgery    []Prescription drug management     History of Present Illness:     Pt S&E. Laying in bed in NAD  More alert today. No fevers reported    10-14 point ROS reviewed negative, unless as noted above    Objective:        Intake/Output Summary (Last 24 hours) at 8/3/2020 0801  Last data filed at 8/3/2020 0354  Gross per 24 hour   Intake 2598 ml   Output 700 ml Net 1898 ml      Vitals:   Vitals:    08/03/20 0645   BP: (!) 112/58   Pulse: 73   Resp: 16   Temp:    SpO2: 97%     Physical Exam: *   GEN Awake male, laying in bed in no apparent distress. Appears given age. EYES Pupils are equally round. No scleral discharge  HENT Atraumatic and symmetric head  NECK No apparent thyromegaly  RESP Symmetric chest movement while on room air. CARDIO/VASC Peripheral pulses equal bilaterally and palpable. No peripheral edema. GI Abdomen is not distended. Rectal exam deferred.  Greene catheter is not present. HEME/LYMPH No petechiae or ecchymoses. MSK Spontaneous movement of BL upper extremities  SKIN Normal coloration, warm, dry.   NEURO Cranial nerves appear grossly intact  PSYCH Awake     Medications:   Medications:    sodium chloride flush  10 mL Intravenous 2 times per day    enoxaparin  30 mg Subcutaneous Daily    cefTRIAXone (ROCEPHIN) IV  1 g Intravenous Q24H    azithromycin  500 mg Intravenous Q24H      Infusions:    sodium chloride 100 mL/hr at 08/02/20 1205     PRN Meds: acetaminophen, 650 mg, Q6H PRN    Or  acetaminophen, 650 mg, Q6H PRN  sodium chloride flush, 10 mL, PRN  acetaminophen, 650 mg, Q6H PRN    Or  acetaminophen, 650 mg, Q6H PRN  polyethylene glycol, 17 g, Daily PRN  promethazine, 12.5 mg, Q6H PRN    Or  ondansetron, 4 mg, Q6H PRN          Electronically signed by Aracelis Garcia DO on 8/3/2020 at 8:01 AM

## 2020-08-03 NOTE — PROGRESS NOTES
PO2ART, RAA2TXF, HCO3, BEART, O2SAT in the last 72 hours. Lab Results   Component Value Date    PROBNP 542.4 (H) 08/02/2020    PROBNP 79.11 01/14/2015    PROBNP 818.9 (H) 12/06/2014     No results found for: [facility]    Radiology Review:  Pertinent images / reports were reviewed as a part of this visit. Assessment:     Patient Active Problem List   Diagnosis    Pneumonia due to organism    ARF (acute respiratory failure) ([address]    Essential hypertension, benign    Depressive disorder, not elsewhere classified    Acute renal failure ([address]    Metabolic encephalopathy    SOB (shortness of breath)       Plan:   1. Overall the patient has slightly improved. 2. Wean FiO2.       [address] MD  8/3/2020  10:31 AM

## 2020-08-03 NOTE — PROGRESS NOTES
Physician Progress Note      Dino Atkinson  [facility] #:                  [id number]  :                       1932  ADMIT DATE:       2020 11:57 PM  DISCH DATE:  RESPONDING  PROVIDER #:        ROSSY FERNANDO DO          QUERY TEXT:    Dear Attending. Pt admitted with pneumonia. Pt noted to have WBC 19.5/17.9/15.9, lactate 3.1    lactic acid 3.1 procalcitonin 1.3  CRP >300. Sepsis documented per ED   provider. If possible, please document in the progress notes and discharge   summary if you are evaluating and /or treating any of the following: The medical record reflects the following:  Risk Factors: pneumonia, HTN, age  Clinical Indicators: lactate 3.1  lactic acid 3.1  procalcitonin 1.3  CRP >300    WBC 19.5/17.9/15.9  SCr 2/1.88  CXR-Questionable right upper lobe airspace opacity could be related to   summation of overlying tissues but could also be seen with pneumonia. 2.   Bibasilar atelectasis and/or scarring. Treatment:  then at 100/hr, Zithromax, Rocephin, pulmonology consult,   supplemental O2, cultures, labs. monitoring    Thank you, Valeria Keene RN BSN CDS  [phone]  Options provided:  -- Sepsis, present on admission  -- No Sepsis, pneumonia only  -- Sepsis was ruled out  -- Other - I will add my own diagnosis  -- Disagree - Clinically unable to determine / Unknown  -- Refer to Clinical Documentation Reviewer    PROVIDER RESPONSE TEXT:    This patient has sepsis which was present on admission. Query created by: Franca Phillips on 8/3/2020 2:10 PM      QUERY TEXT:    Pt admitted with AMS, pneumonia and RONALDO. Pt was A&O x2 in ED and oriented to   self only per H&P. If possible, please document in the progress notes and   discharge summary if you are evaluating and / or treating any of the   following:     The medical record reflects the following:  Risk Factors: pneumonia, RONALDO  Clinical Indicators: ED provider-pt lethargic, A&O x2, toxic appearance, weakness, fatigue  H&P alert to self only . Virgilio Parisi AMS in setting of Cough and SOB likely d/t   Community-acquired pneumonia  CT head-Mild cerebral white matter chronic microvascular ischemic disease. 2.   Moderate diffuse cerebral atrophy. Treatment: antibiotics, IVF, CT head, labs, ABG's, monitoring    Thank you, Jostin Plunkett RN BSN CDS  [phone]  Options provided:  -- Metabolic encephalopathy  -- Septic encephalopathy  -- Other - I will add my own diagnosis  -- Disagree - Clinically unable to determine / Unknown  -- Refer to Clinical Documentation Reviewer    PROVIDER RESPONSE TEXT:    This patient has septic encephalopathy. Query created by: Clayton Mackenzie on 8/3/2020 2:23 PM      QUERY TEXT:    Patient admitted with AMS, pneumonia, RONALDO. Per H&P  noted to also have   decubitus ulcer. If possible, please document in progress notes and discharge   summary the stage of the decubitus ulcer: The medical record reflects the following:  Risk Factors: pt found sitting in chair in soiled pants for unknow period of   time  Clinical Indicators: ED provider-\"stage I pressure ulcers on his rear end\"  H&P-Pressure ulcer  Per nursing assessment pt with redness and excoriation to bilateral buttocks  Treatment: pressure prevention orders, monitoring, turn q 2hrs    Thank you, Jostin Plunkett Rn BSN CDS  [phone]  Options provided:  -- Stage 1 Decubitus Pressure Ulcer to bilateral buttocks POA  -- Other - I will add my own diagnosis  -- Disagree - Clinically unable to determine / Unknown  -- Refer to Clinical Documentation Reviewer    PROVIDER RESPONSE TEXT:    This patient has a Stage 1 decubitus pressure ulcer to bilateral buttocks that   was present on admission.     Query created by: Clayton Mackenzie on 8/3/2020 2:32 PM      Electronically signed by:  Stefan Carrillo DO 8/3/2020 3:37 PM

## 2020-08-04 LAB
ALBUMIN SERPL-MCNC: 2.7 GM/DL (ref 3.4–5)
ALP BLD-CCNC: 135 IU/L (ref 40–129)
ALT SERPL-CCNC: 41 U/L (ref 10–40)
ANION GAP SERPL CALCULATED.3IONS-SCNC: 13 MMOL/L (ref 4–16)
APTT: 31.7 SECONDS (ref 25.1–37.1)
AST SERPL-CCNC: 66 IU/L (ref 15–37)
BASOPHILS ABSOLUTE: 0 K/CU MM
BASOPHILS RELATIVE PERCENT: 0.2 % (ref 0–1)
BILIRUB SERPL-MCNC: 0.5 MG/DL (ref 0–1)
BUN BLDV-MCNC: 40 MG/DL (ref 6–23)
CALCIUM SERPL-MCNC: 7.8 MG/DL (ref 8.3–10.6)
CHLORIDE BLD-SCNC: 105 MMOL/L (ref 99–110)
CO2: 25 MMOL/L (ref 21–32)
CREAT SERPL-MCNC: 1.8 MG/DL (ref 0.9–1.3)
D DIMER: 3118 NG/ML(DDU)
DIFFERENTIAL TYPE: ABNORMAL
EOSINOPHILS ABSOLUTE: 0 K/CU MM
EOSINOPHILS RELATIVE PERCENT: 0.2 % (ref 0–3)
FIBRINOGEN LEVEL: 986 MG/DL (ref 196.9–442.1)
GFR AFRICAN AMERICAN: 43 ML/MIN/1.73M2
GFR NON-AFRICAN AMERICAN: 36 ML/MIN/1.73M2
GLUCOSE BLD-MCNC: 153 MG/DL (ref 70–99)
HCT VFR BLD CALC: 34.5 % (ref 42–52)
HEMOGLOBIN: 10.7 GM/DL (ref 13.5–18)
IMMATURE NEUTROPHIL %: 0.5 % (ref 0–0.43)
INR BLD: 1.15 INDEX
LYMPHOCYTES ABSOLUTE: 0.5 K/CU MM
LYMPHOCYTES RELATIVE PERCENT: 4 % (ref 24–44)
MAGNESIUM: 2.4 MG/DL (ref 1.8–2.4)
MCH RBC QN AUTO: 28.5 PG (ref 27–31)
MCHC RBC AUTO-ENTMCNC: 31 % (ref 32–36)
MCV RBC AUTO: 92 FL (ref 78–100)
MONOCYTES ABSOLUTE: 0.9 K/CU MM
MONOCYTES RELATIVE PERCENT: 8.2 % (ref 0–4)
NUCLEATED RBC %: 0 %
PDW BLD-RTO: 14.8 % (ref 11.7–14.9)
PHOSPHORUS: 1.9 MG/DL (ref 2.5–4.9)
PLATELET # BLD: 149 K/CU MM (ref 140–440)
PMV BLD AUTO: 10.9 FL (ref 7.5–11.1)
POTASSIUM SERPL-SCNC: 4 MMOL/L (ref 3.5–5.1)
PRO-BNP: 1904 PG/ML
PROCALCITONIN: 1.22
PROTHROMBIN TIME: 13.9 SECONDS (ref 11.7–14.5)
RBC # BLD: 3.75 M/CU MM (ref 4.6–6.2)
SEGMENTED NEUTROPHILS ABSOLUTE COUNT: 9.7 K/CU MM
SEGMENTED NEUTROPHILS RELATIVE PERCENT: 86.9 % (ref 36–66)
SODIUM BLD-SCNC: 143 MMOL/L (ref 135–145)
TOTAL IMMATURE NEUTOROPHIL: 0.06 K/CU MM
TOTAL NUCLEATED RBC: 0 K/CU MM
TOTAL PROTEIN: 5 GM/DL (ref 6.4–8.2)
WBC # BLD: 11.1 K/CU MM (ref 4–10.5)

## 2020-08-04 PROCEDURE — 85610 PROTHROMBIN TIME: CPT

## 2020-08-04 PROCEDURE — 85379 FIBRIN DEGRADATION QUANT: CPT

## 2020-08-04 PROCEDURE — 83735 ASSAY OF MAGNESIUM: CPT

## 2020-08-04 PROCEDURE — 94640 AIRWAY INHALATION TREATMENT: CPT

## 2020-08-04 PROCEDURE — 1200000000 HC SEMI PRIVATE

## 2020-08-04 PROCEDURE — 84100 ASSAY OF PHOSPHORUS: CPT

## 2020-08-04 PROCEDURE — 85025 COMPLETE CBC W/AUTO DIFF WBC: CPT

## 2020-08-04 PROCEDURE — 85730 THROMBOPLASTIN TIME PARTIAL: CPT

## 2020-08-04 PROCEDURE — 6360000002 HC RX W HCPCS: Performed by: INTERNAL MEDICINE

## 2020-08-04 PROCEDURE — 85384 FIBRINOGEN ACTIVITY: CPT

## 2020-08-04 PROCEDURE — 80053 COMPREHEN METABOLIC PANEL: CPT

## 2020-08-04 PROCEDURE — 84145 PROCALCITONIN (PCT): CPT

## 2020-08-04 PROCEDURE — 2700000000 HC OXYGEN THERAPY PER DAY

## 2020-08-04 PROCEDURE — 2580000003 HC RX 258: Performed by: PHYSICIAN ASSISTANT

## 2020-08-04 PROCEDURE — 2580000003 HC RX 258: Performed by: INTERNAL MEDICINE

## 2020-08-04 PROCEDURE — 83880 ASSAY OF NATRIURETIC PEPTIDE: CPT

## 2020-08-04 RX ADMIN — ENOXAPARIN SODIUM 30 MG: 30 INJECTION SUBCUTANEOUS at 08:41

## 2020-08-04 RX ADMIN — ALBUTEROL SULFATE 2 PUFF: 108 AEROSOL, METERED RESPIRATORY (INHALATION) at 12:34

## 2020-08-04 RX ADMIN — DEXTROSE MONOHYDRATE 500 MG: 50 INJECTION, SOLUTION INTRAVENOUS at 01:56

## 2020-08-04 RX ADMIN — CEFTRIAXONE SODIUM 1 G: 1 INJECTION, POWDER, FOR SOLUTION INTRAMUSCULAR; INTRAVENOUS at 01:03

## 2020-08-04 RX ADMIN — ALBUTEROL SULFATE 2 PUFF: 108 AEROSOL, METERED RESPIRATORY (INHALATION) at 08:56

## 2020-08-04 RX ADMIN — SODIUM CHLORIDE: 9 INJECTION, SOLUTION INTRAVENOUS at 07:29

## 2020-08-04 RX ADMIN — IPRATROPIUM BROMIDE 2 PUFF: 17 AEROSOL, METERED RESPIRATORY (INHALATION) at 12:34

## 2020-08-04 RX ADMIN — IPRATROPIUM BROMIDE 2 PUFF: 17 AEROSOL, METERED RESPIRATORY (INHALATION) at 08:58

## 2020-08-04 ASSESSMENT — PAIN SCALES - GENERAL
PAINLEVEL_OUTOF10: 0

## 2020-08-04 NOTE — PROGRESS NOTES
Hospitalist Progress Note      Name:  Jerald Barakat /Age/Sex: 1932  (80 y.o. male)   MRN & CSN:  [id number] & [id number] Admission Date/Time: 2020 11:57 PM   Location:  -A PCP: Gi Wilder MD         Hospital Day: 4    ASSESSMENT & PLAN:   Jerald Barakat is a 80 y.o.  male who was admitted to the hospital for hypoxia, requiring nonrebreather. Disposition: Home versus SNF to be determined,  wife  about 4 weeks ago and he is grieving. Possible SNF discharge. PT/OT  SARS-CoV-2 collected on  negative  Transfer to [place] [address] as of     Pneumonia----unspecified organism. Strep & Legionella antigen negative. Respiratory viral panel negative. BCx 24 hours NG. WBC 19.5 on admission. WBC 15.9 this morning. CXR with questionable right upper lobe opacity. SARS-CoV-2 negative.   -Continue ceftriaxone and azithromycin x5 days  -CBC daily    RONALDO on CKD 2---- CR on admission 2.0. Last CR on file 1.4 in 2014. Possibly CKD progression and may not have RONALDO. -CMP daily  -Avoid nephrotoxins/contrast  -IVF at 50 cc/h x 1 L    Hypertension----- BP controlled. Toprol-XL medication list.  Had been off antihypertensives while in the hospital.  -Continue monitoring off antihypertensives        MEDICAL DECISION MAKING:  -Labs reviewed  -Imaging reviewed  -Level of risk moderate  Diet DIET GENERAL;   DVT Prophylaxis [x] Lovenox, []  Heparin, [] SCDs, [] Ambulation   GI Prophylaxis [] PPI,  [] H2 Blocker,  [] Carafate,  [] Diet/Tube Feeds   Code Status Full Code   Disposition  Home versus SNF   MDM [] Low, [x] Moderate,[]  High     Chief complaint/Interval History/ROS     Chief Complaint: Hypoxia, pneumonia      INTERVAL HISTORY: SARS-CoV-2 negative. He is requiring supplemental oxygen. No acute events overnight. Patient to be transferred to [place] [address]. ROS:  No chest pain. No abdominal pain. No nausea. No vomiting. Objective:        Intake/Output Summary (Last 24 hours) at

## 2020-08-05 LAB
ALBUMIN SERPL-MCNC: 2.7 GM/DL (ref 3.4–5)
ALP BLD-CCNC: 167 IU/L (ref 40–128)
ALT SERPL-CCNC: 96 U/L (ref 10–40)
ANION GAP SERPL CALCULATED.3IONS-SCNC: 11 MMOL/L (ref 4–16)
AST SERPL-CCNC: 120 IU/L (ref 15–37)
BASOPHILS ABSOLUTE: 0 K/CU MM
BASOPHILS RELATIVE PERCENT: 0.3 % (ref 0–1)
BILIRUB SERPL-MCNC: 0.4 MG/DL (ref 0–1)
BUN BLDV-MCNC: 36 MG/DL (ref 6–23)
CALCIUM SERPL-MCNC: 7.7 MG/DL (ref 8.3–10.6)
CHLORIDE BLD-SCNC: 110 MMOL/L (ref 99–110)
CO2: 24 MMOL/L (ref 21–32)
CREAT SERPL-MCNC: 1.5 MG/DL (ref 0.9–1.3)
DIFFERENTIAL TYPE: ABNORMAL
EOSINOPHILS ABSOLUTE: 0.1 K/CU MM
EOSINOPHILS RELATIVE PERCENT: 0.5 % (ref 0–3)
GFR AFRICAN AMERICAN: 54 ML/MIN/1.73M2
GFR NON-AFRICAN AMERICAN: 44 ML/MIN/1.73M2
GLUCOSE BLD-MCNC: 117 MG/DL (ref 70–99)
HCT VFR BLD CALC: 36.9 % (ref 42–52)
HEMOGLOBIN: 11.3 GM/DL (ref 13.5–18)
IMMATURE NEUTROPHIL %: 0.4 % (ref 0–0.43)
LYMPHOCYTES ABSOLUTE: 0.7 K/CU MM
LYMPHOCYTES RELATIVE PERCENT: 6.2 % (ref 24–44)
MAGNESIUM: 2.4 MG/DL (ref 1.8–2.4)
MCH RBC QN AUTO: 28 PG (ref 27–31)
MCHC RBC AUTO-ENTMCNC: 30.6 % (ref 32–36)
MCV RBC AUTO: 91.6 FL (ref 78–100)
MONOCYTES ABSOLUTE: 1.2 K/CU MM
MONOCYTES RELATIVE PERCENT: 10.6 % (ref 0–4)
NUCLEATED RBC %: 0 %
PDW BLD-RTO: 14.7 % (ref 11.7–14.9)
PHOSPHORUS: 2.4 MG/DL (ref 2.5–4.9)
PLATELET # BLD: 164 K/CU MM (ref 140–440)
PMV BLD AUTO: 10.5 FL (ref 7.5–11.1)
POTASSIUM SERPL-SCNC: 4.2 MMOL/L (ref 3.5–5.1)
PRO-BNP: 1687 PG/ML
RBC # BLD: 4.03 M/CU MM (ref 4.6–6.2)
SEGMENTED NEUTROPHILS ABSOLUTE COUNT: 9.1 K/CU MM
SEGMENTED NEUTROPHILS RELATIVE PERCENT: 82 % (ref 36–66)
SODIUM BLD-SCNC: 145 MMOL/L (ref 135–145)
TOTAL IMMATURE NEUTOROPHIL: 0.04 K/CU MM
TOTAL NUCLEATED RBC: 0 K/CU MM
TOTAL PROTEIN: 5.1 GM/DL (ref 6.4–8.2)
WBC # BLD: 11.1 K/CU MM (ref 4–10.5)

## 2020-08-05 PROCEDURE — 1200000000 HC SEMI PRIVATE

## 2020-08-05 PROCEDURE — 84100 ASSAY OF PHOSPHORUS: CPT

## 2020-08-05 PROCEDURE — 94761 N-INVAS EAR/PLS OXIMETRY MLT: CPT

## 2020-08-05 PROCEDURE — 6360000002 HC RX W HCPCS: Performed by: INTERNAL MEDICINE

## 2020-08-05 PROCEDURE — 94664 DEMO&/EVAL PT USE INHALER: CPT

## 2020-08-05 PROCEDURE — 94640 AIRWAY INHALATION TREATMENT: CPT

## 2020-08-05 PROCEDURE — 83735 ASSAY OF MAGNESIUM: CPT

## 2020-08-05 PROCEDURE — 97530 THERAPEUTIC ACTIVITIES: CPT

## 2020-08-05 PROCEDURE — 97535 SELF CARE MNGMENT TRAINING: CPT

## 2020-08-05 PROCEDURE — 2580000003 HC RX 258: Performed by: INTERNAL MEDICINE

## 2020-08-05 PROCEDURE — 97162 PT EVAL MOD COMPLEX 30 MIN: CPT

## 2020-08-05 PROCEDURE — 83880 ASSAY OF NATRIURETIC PEPTIDE: CPT

## 2020-08-05 PROCEDURE — 6370000000 HC RX 637 (ALT 250 FOR IP): Performed by: INTERNAL MEDICINE

## 2020-08-05 PROCEDURE — 80053 COMPREHEN METABOLIC PANEL: CPT

## 2020-08-05 PROCEDURE — 97167 OT EVAL HIGH COMPLEX 60 MIN: CPT

## 2020-08-05 PROCEDURE — 2700000000 HC OXYGEN THERAPY PER DAY

## 2020-08-05 PROCEDURE — 92610 EVALUATE SWALLOWING FUNCTION: CPT

## 2020-08-05 PROCEDURE — 80074 ACUTE HEPATITIS PANEL: CPT

## 2020-08-05 PROCEDURE — 85025 COMPLETE CBC W/AUTO DIFF WBC: CPT

## 2020-08-05 RX ORDER — LORAZEPAM 1 MG/1
2 TABLET ORAL NIGHTLY
Status: DISCONTINUED | OUTPATIENT
Start: 2020-08-05 | End: 2020-08-05

## 2020-08-05 RX ORDER — MIRTAZAPINE 15 MG/1
45 TABLET, FILM COATED ORAL NIGHTLY
Status: DISCONTINUED | OUTPATIENT
Start: 2020-08-05 | End: 2020-08-13 | Stop reason: HOSPADM

## 2020-08-05 RX ORDER — METOPROLOL SUCCINATE 25 MG/1
25 TABLET, EXTENDED RELEASE ORAL DAILY
Status: DISCONTINUED | OUTPATIENT
Start: 2020-08-05 | End: 2020-08-13 | Stop reason: HOSPADM

## 2020-08-05 RX ORDER — CLONIDINE HYDROCHLORIDE 0.1 MG/1
0.1 TABLET ORAL EVERY 6 HOURS PRN
Status: DISCONTINUED | OUTPATIENT
Start: 2020-08-05 | End: 2020-08-13 | Stop reason: HOSPADM

## 2020-08-05 RX ORDER — LORAZEPAM 1 MG/1
1 TABLET ORAL 2 TIMES DAILY
Status: DISCONTINUED | OUTPATIENT
Start: 2020-08-05 | End: 2020-08-05

## 2020-08-05 RX ORDER — QUETIAPINE FUMARATE 100 MG/1
100 TABLET, FILM COATED ORAL 2 TIMES DAILY
Status: DISCONTINUED | OUTPATIENT
Start: 2020-08-05 | End: 2020-08-13 | Stop reason: HOSPADM

## 2020-08-05 RX ADMIN — ALBUTEROL SULFATE 2 PUFF: 108 AEROSOL, METERED RESPIRATORY (INHALATION) at 09:01

## 2020-08-05 RX ADMIN — IPRATROPIUM BROMIDE 2 PUFF: 17 AEROSOL, METERED RESPIRATORY (INHALATION) at 09:03

## 2020-08-05 RX ADMIN — SODIUM CHLORIDE, PRESERVATIVE FREE 10 ML: 5 INJECTION INTRAVENOUS at 10:47

## 2020-08-05 RX ADMIN — POTASSIUM & SODIUM PHOSPHATES POWDER PACK 280-160-250 MG 250 MG: 280-160-250 PACK at 20:16

## 2020-08-05 RX ADMIN — IPRATROPIUM BROMIDE 2 PUFF: 17 AEROSOL, METERED RESPIRATORY (INHALATION) at 11:49

## 2020-08-05 RX ADMIN — SODIUM CHLORIDE, PRESERVATIVE FREE 10 ML: 5 INJECTION INTRAVENOUS at 20:16

## 2020-08-05 RX ADMIN — ENOXAPARIN SODIUM 30 MG: 30 INJECTION SUBCUTANEOUS at 10:47

## 2020-08-05 RX ADMIN — IPRATROPIUM BROMIDE 2 PUFF: 17 AEROSOL, METERED RESPIRATORY (INHALATION) at 16:04

## 2020-08-05 RX ADMIN — POTASSIUM & SODIUM PHOSPHATES POWDER PACK 280-160-250 MG 250 MG: 280-160-250 PACK at 17:31

## 2020-08-05 RX ADMIN — SODIUM CHLORIDE: 9 INJECTION, SOLUTION INTRAVENOUS at 00:32

## 2020-08-05 RX ADMIN — CEFTRIAXONE SODIUM 1 G: 1 INJECTION, POWDER, FOR SOLUTION INTRAMUSCULAR; INTRAVENOUS at 10:47

## 2020-08-05 RX ADMIN — MIRTAZAPINE 45 MG: 15 TABLET, FILM COATED ORAL at 20:16

## 2020-08-05 RX ADMIN — POTASSIUM & SODIUM PHOSPHATES POWDER PACK 280-160-250 MG 250 MG: 280-160-250 PACK at 13:11

## 2020-08-05 RX ADMIN — QUETIAPINE FUMARATE 100 MG: 100 TABLET ORAL at 20:16

## 2020-08-05 RX ADMIN — ALBUTEROL SULFATE 2 PUFF: 108 AEROSOL, METERED RESPIRATORY (INHALATION) at 16:04

## 2020-08-05 RX ADMIN — ALBUTEROL SULFATE 2 PUFF: 108 AEROSOL, METERED RESPIRATORY (INHALATION) at 11:47

## 2020-08-05 ASSESSMENT — PAIN SCALES - GENERAL: PAINLEVEL_OUTOF10: 0

## 2020-08-05 NOTE — CONSULTS
[address], 12/31/1932, [id number], 8/5/2020    History  [place]:  The primary encounter diagnosis was Pneumonia due to organism. Diagnoses of Septicemia ([address] and General weakness were also pertinent to this visit. Patient  has a past medical history of Anxiety, Depression, Hypertension, and Nerves. Patient  has a past surgical history that includes joint replacement. Subjective:  Patient states:  Amenable to therapy. Pain:  Denies. Communication with other providers:  Handoff to RN, co-eval with OT. Restrictions: Fall risk    Home Setup/Prior level of function  Social/Functional History  Lives With: Son  Type of Home: House  Home Layout: Two level  Home Access: Stairs to enter with rails  Entrance Stairs - Number of Steps: Pt uncertain  Entrance Stairs - Rails: Both  Home Equipment: Cane, Rolling walker  ADL Assistance: Independent  Homemaking Assistance: Independent  Ambulation Assistance: Independent(mod I with cane or RW)  Transfer Assistance: Independent  Active : No(Son drives)  Occupation: Retired  ** patient is a poor historian**    Examination of body systems (includes body structures/functions, activity/participation limitations):  · Observation:  Supine in bed upon arrival with [facility] elevated 45 degrees. · Vision:  [facility]  · Hearing:  [facility]  · Cardiopulmonary: On 1L of O2  · Cognition: Impaired, poor historian, able to respond to yes/no questions, able to follow simple commands, see OT/SLP note for further evaluation. Musculoskeletal  · ROM R/L:  WFL. · Strength R/L:  4-/5, weakness in function and endurance. · Neuro:  [facility]      Mobility:  · Rolling L/R:  NT  · Supine to sit:  Mod A + Min A  · Transfers: Max A x1-2  · Sitting balance:  CGA-Min A.    · Standing balance:   Mod A.    · Gait: NT    [facility] 6 Clicks Inpatient Mobility:  AM-PAC Inpatient Mobility Raw Score : 9    Treatment:  Sup-Sit: Mod A + Min A to sit at EOB. Mod A at shoulders and hips, Min A at LE, bed rail assist. Max cues for UE positioning/assist, LE placement, and scooting. Sitting Balance: SBA-Min A at EOB x5mins. Demonstrated R Lean, retropulsive. Improved to SBA-CGA with max cues. Max cues for anterior weight shift, scooting, midline orientation, UE placement/assist, and LE positioning. STS: Mod A x2 from EOB to RW (2x), Max A x1 from [facility] to anterior postioning of the therapist (2x). Seated rest breaks between. Demonstrated forward flexed posture with increased L knee flexion, increased R knee extension, and R lean. Max cues for UE positioning/assist, LE placement, anterior weight shift, LE engagement, and upright posture. Difficulty implementing cues. Increased time and effort. Standing Balance: Stood at [employer] x1min with RW and Max A, Max A x2 mins (2x) at [facility] for hygiene. Demonstrated forward flexed posture with increased L knee flexion, increased R knee extension, and R lean. Cues for increased L knee extension, upright posture, UE positioning, and hip extension. Difficulty implementing cues. Stand Pivot: from EOB to BSC (1x), from [facility] to chair (1x). Max A 1-2 throughout. Cued for anterior weight shift, UE/LE positioning, and LE engagement. Increased time and effort. Safety: patient left in chair with chair alarm, call light within reach, RN notified, gait belt used. Assessment:  Patient is a 79 yo male who presents with hypoxia, SOB, and AMS. Imaging positive for pneumonia. Relevant hx includes HTN, amd CKD 2. Patient demonstrates decreased strength, balance, and functional mobility compared to baseline. Home social history is unclear at this time, patient is a poor historian. Patient would benefit from skilled therapy services at this time to address deficits, would recommend SNF. Complexity: Moderate  Prognosis: Good, no significant barriers to participation at this time.    Plan Times per week: 3+/week, 1 week,      Equipment: TBD    Goals:  Short term goals  Time Frame for Short term goals: 1 week  Short term goal 1: Patient will complete bed mobility Min A  Short term goal 2: Patient will perform STS x5 with LRAD and Mod A. Short term goal 3: Patient will ambulate x6ft with LRAD and Mod A.        Treatment plan:  Bed mobility, transfers, balance, gait, TA, TX, stairs    Recommendations for NURSING mobility: Squat pivot to [facility] with gait belt    Time:   Time in: 0947  Time out: 1026  Timed treatment minutes: 30  Total time: 39    Electronically signed by:    Kim Amaya  8/5/2020, 11:04 AM

## 2020-08-05 NOTE — PROGRESS NOTES
Pulmonary and Critical Care  Progress Note    Subjective: The patient is improving. Shortness of breath better. Chest pain none. Addressing respiratory complaints Patient is negative for  hemoptysis and cyanosis  CONSTITUTIONAL:  negative for fevers and chills      Past Medical History:     has a past medical history of Anxiety, Depression, Hypertension, and Nerves. has a past surgical history that includes joint replacement. reports that he quit smoking about 45 years ago. His smokeless tobacco use includes chew. He reports that he does not drink alcohol or use drugs. Family history:  family history includes Cancer in his father, mother, and sister. No Known Allergies  Social History:    Reviewed; no changes    Objective:   PHYSICAL EXAM:        VITALS:  /64   Pulse 69   Temp 98 °F (36.7 °C) (Oral)   Resp 20   Ht 5' 8\" (1.727 m)   Wt 191 lb 9.3 oz (86.9 kg)   SpO2 93%   BMI 29.13 kg/m²     24HR INTAKE/OUTPUT:      Intake/Output Summary (Last 24 hours) at 8/5/2020 1051  Last data filed at 8/5/2020 1047  Gross per 24 hour   Intake 10 ml   Output 850 ml   Net -840 ml       CONSTITUTIONAL:  Awake. LUNGS:  decreased breath sounds, occ basilar crackles. CARDIOVASCULAR:  normal S1 and S2 and negative JVD  ABD:Abdomen soft, non-tender. BS normal. No masses,  No organomegaly  NEURO:Alert.   DATA:    CBC:  Recent Labs     08/03/20  0350 08/04/20  1050 08/05/20  0323   WBC 15.9* 11.1* 11.1*   RBC 4.01* 3.75* 4.03*   HGB 11.3* 10.7* 11.3*   HCT 36.6* 34.5* 36.9*    149 164   MCV 91.3 92.0 91.6   MCH 28.2 28.5 28.0   MCHC 30.9* 31.0* 30.6*   RDW 14.8 14.8 14.7   SEGSPCT 87.0* 86.9* 82.0*      BMP:  Recent Labs     08/03/20  0350 08/04/20  1050 08/05/20  0323    143 145   K 4.2 4.0 4.2    105 110   CO2 25 25 24   BUN 43* 40* 36*   CREATININE 1.8* 1.8* 1.5*   CALCIUM 7.9* 7.8* 7.7*   GLUCOSE 202* 153* 117*      ABG:  No results for input(s): PH, PO2ART, JZX1XLJ, HCO3, BEART, O2SAT in the last 72 hours. Lab Results   Component Value Date    PROBNP 1,687 (H) 08/05/2020    PROBNP 1,904 (H) 08/04/2020    PROBNP 542.4 (H) 08/02/2020     No results found for: [facility]    Radiology Review:  Pertinent images / reports were reviewed as a part of this visit. Assessment:     Patient Active Problem List   Diagnosis    Pneumonia due to organism    [facility]acute [facility] failure) [facility]    Essential hypertension, benign    Depressive disorder, not elsewhere classified    Acute renal failure ([facility]    Metabolic encephalopathy    SOB (shortness of breath)       Plan:   1. Overall the patient has improved. 2. Wean FiO2.  3. Repeat CXR.     Gio Gaona MD  8/5/2020  10:51 AM

## 2020-08-05 NOTE — PROGRESS NOTES
pharynx without exudates, no evidence of thrush. NECK Supple, no apparent thyromegaly or masses. RESP occasional crackles. CARDIO/VASC S1/S2 auscultated. Regular rate without appreciable murmurs, rubs, or gallops. No JVD or carotid bruits. Peripheral pulses equal bilaterally and palpable. No peripheral edema. GI Abdomen is soft without significant tenderness, masses, or guarding. Bowel sounds are normoactive. Rectal exam deferred. MSK No gross joint deformities. SKIN Normal coloration, warm, dry. NEURO Cranial nerves appear grossly intact, normal speech, no lateralizing weakness. PSYCH Awake, alert, oriented x 4. Affect appropriate.     Medications:   Medications:    potassium & sodium phosphates  1 packet Oral 4x Daily    mirtazapine  45 mg Oral Nightly    QUEtiapine  100 mg Oral BID    metoprolol succinate  25 mg Oral Daily    cefTRIAXone (ROCEPHIN) IV  1 g Intravenous Q24H    albuterol sulfate HFA  2 puff Inhalation 4x daily    ipratropium  2 puff Inhalation 4x daily    sodium chloride flush  10 mL Intravenous 2 times per day    enoxaparin  30 mg Subcutaneous Daily      Infusions:   PRN Meds: cloNIDine, 0.1 mg, Q6H PRN  sodium chloride flush, 10 mL, PRN  acetaminophen, 650 mg, Q6H PRN    Or  acetaminophen, 650 mg, Q6H PRN  polyethylene glycol, 17 g, Daily PRN  promethazine, 12.5 mg, Q6H PRN    Or  ondansetron, 4 mg, Q6H PRN        Recent Labs     08/03/20  0350 08/04/20  1050 08/05/20  0323   WBC 15.9* 11.1* 11.1*   HGB 11.3* 10.7* 11.3*   HCT 36.6* 34.5* 36.9*    149 164      Recent Labs     08/03/20  0350 08/04/20  1050 08/05/20  0323    143 145   K 4.2 4.0 4.2    105 110   CO2 25 25 24   PHOS  --  1.9* 2.4*   BUN 43* 40* 36*   CREATININE 1.8* 1.8* 1.5*     Recent Labs     08/03/20  0350 08/04/20  1050 08/05/20  0323   AST 50* 66* 120*   ALT 25 41* 96*   BILITOT 0.6 0.5 0.4   ALKPHOS 88 135* 167*     Recent Labs     08/03/20  0350 08/04/20  1050   INR 1.18 1.15     No

## 2020-08-05 NOTE — PROGRESS NOTES
Speech Language Pathology  Facility/Department: [address] [place] [address] BEDSIDE SWALLOW EVALUATION    NAME: Nancie Xiao  : 1932  MRN: [id number]    IMPRESSIONS: Nancie Xiao was referred for a bedside swallow evaluation after being admitted to [facility] with hypoxia and suspected PNA. He tested negative for COVID. Medical hx includes HTN, depression/anxiety. No known recent history of dysphagia; he previously had acute dysphagia during admission over 5 years ago, no other more recent episodes noted per chart review. Pt seen for evaluation seated upright in bed, alert, confused, cooperative given cues. Oral mechanism examination reveals lingual tremor, adequate ROM, overall reduced coordination/strength without asymmetry. Pt was presented with PO trials of ice chips, thin liquids via tsp/cup/straw, nectar thick liquids via tsp/cup/straw, puree, and regular solids. Oral stage WFL with adequate mastication, intact AP transit and oral clearance. Suspect mild-moderate pharyngeal dysphagia with decreased laryngeal elevation, immediate cough inconsistently following cup and straw drinks of thin liquids. Pt was noted to be impulsive with intake of liquids and required hands-on assistance to limited rate/volume of intake. Recommend continued regular diet with downgrade to nectar thick liquids. Allow ice chips for comfort. SLP will follow. ADMISSION DATE: 2020  ADMITTING DIAGNOSIS: has Pneumonia due to organism; ARF (acute respiratory failure) ([address] Essential hypertension, benign; Depressive disorder, not elsewhere classified; Acute renal failure ([address]  Metabolic encephalopathy; and SOB (shortness of breath) on their problem list.  ONSET DATE: this admission    Recent Chest Xray/CT of Chest: see chart    Date of Eval: 2020  Evaluating Therapist: Marielena Lowry    Current Diet level:  Current Diet : Regular  Current Liquid Diet : Thin      Primary Complaint  Patient Complaint: coughing with Positioning: Upright in bed  Prior Dysphagia History: yes; 5 years ago during admission  Consistencies Administered: Reg solid; Dysphagia Pureed (Dysphagia I); Nectar - straw;Nectar - teaspoon;Nectar - cup; Thin - teaspoon; Thin - cup; Thin - straw; Ice Chips           Vision/Hearing  Vision  Vision: Within Functional Limits  Hearing  Hearing: Within functional limits    Oral Motor Deficits  Oral/Motor  Oral Motor: Exceptions to [facility]    Oral Phase Dysfunction  Oral Phase  Oral Phase: WFL     Indicators of Pharyngeal Phase Dysfunction   Pharyngeal Phase  Pharyngeal Phase: Exceptions    Prognosis  Prognosis  Prognosis for safe diet advancement: fair  Barriers/Prognosis Comment: unclear etiology of dysphagia  Individuals consulted  Consulted and agree with results and recommendations: RN;Patient    Education  Patient Education: results, recommendations  Patient Education Response: Verbalizes understanding;Needs reinforcement  Safety Devices in place: Yes  Type of devices:  All fall risk precautions in place       Therapy Time  SLP Individual Minutes  Time In: 0830  Time Out: 9346  Minutes: [facility], [address] [place] [address] Road  8/5/2020 8:56 AM

## 2020-08-05 NOTE — CARE COORDINATION
LSW  spoke with pt and his dght about OT recommendation. Still waiting on PT eval.  Dght is agreeable for pt to go to SNF and would like pt to go to [facility]. LSW called Hedy with [facility] and left message with referral information. Pt has [facility] and will need a precert. Pt is COVID negative.

## 2020-08-06 ENCOUNTER — APPOINTMENT (OUTPATIENT)
Dept: ULTRASOUND IMAGING | Age: 85
DRG: 871 | End: 2020-08-06
Payer: MEDICARE

## 2020-08-06 ENCOUNTER — APPOINTMENT (OUTPATIENT)
Dept: GENERAL RADIOLOGY | Age: 85
DRG: 871 | End: 2020-08-06
Payer: MEDICARE

## 2020-08-06 LAB
ALBUMIN SERPL-MCNC: 2.5 GM/DL (ref 3.4–5)
ALP BLD-CCNC: 202 IU/L (ref 40–129)
ALT SERPL-CCNC: 138 U/L (ref 10–40)
ANION GAP SERPL CALCULATED.3IONS-SCNC: 13 MMOL/L (ref 4–16)
AST SERPL-CCNC: 134 IU/L (ref 15–37)
BILIRUB SERPL-MCNC: 0.4 MG/DL (ref 0–1)
BUN BLDV-MCNC: 32 MG/DL (ref 6–23)
CALCIUM SERPL-MCNC: 7.9 MG/DL (ref 8.3–10.6)
CHLORIDE BLD-SCNC: 107 MMOL/L (ref 99–110)
CO2: 20 MMOL/L (ref 21–32)
CREAT SERPL-MCNC: 1.3 MG/DL (ref 0.9–1.3)
GFR AFRICAN AMERICAN: >60 ML/MIN/1.73M2
GFR NON-AFRICAN AMERICAN: 52 ML/MIN/1.73M2
GLUCOSE BLD-MCNC: 99 MG/DL (ref 70–99)
HAV IGM SER IA-ACNC: NON REACTIVE
HEPATITIS B CORE IGM ANTIBODY: NON REACTIVE
HEPATITIS B SURFACE ANTIGEN: NON REACTIVE
HEPATITIS C ANTIBODY: NON REACTIVE
MAGNESIUM: 2.5 MG/DL (ref 1.8–2.4)
POTASSIUM SERPL-SCNC: 4.8 MMOL/L (ref 3.5–5.1)
REASON FOR REJECTION: NORMAL
REJECTED TEST: NORMAL
SODIUM BLD-SCNC: 140 MMOL/L (ref 135–145)
TOTAL PROTEIN: 5.1 GM/DL (ref 6.4–8.2)

## 2020-08-06 PROCEDURE — 2580000003 HC RX 258: Performed by: INTERNAL MEDICINE

## 2020-08-06 PROCEDURE — 92526 ORAL FUNCTION THERAPY: CPT

## 2020-08-06 PROCEDURE — 94640 AIRWAY INHALATION TREATMENT: CPT

## 2020-08-06 PROCEDURE — 1200000000 HC SEMI PRIVATE

## 2020-08-06 PROCEDURE — 76705 ECHO EXAM OF ABDOMEN: CPT

## 2020-08-06 PROCEDURE — 71045 X-RAY EXAM CHEST 1 VIEW: CPT

## 2020-08-06 PROCEDURE — 6360000002 HC RX W HCPCS: Performed by: INTERNAL MEDICINE

## 2020-08-06 PROCEDURE — 80053 COMPREHEN METABOLIC PANEL: CPT

## 2020-08-06 PROCEDURE — 97110 THERAPEUTIC EXERCISES: CPT

## 2020-08-06 PROCEDURE — 83735 ASSAY OF MAGNESIUM: CPT

## 2020-08-06 PROCEDURE — 94761 N-INVAS EAR/PLS OXIMETRY MLT: CPT

## 2020-08-06 PROCEDURE — 36415 COLL VENOUS BLD VENIPUNCTURE: CPT

## 2020-08-06 PROCEDURE — 97530 THERAPEUTIC ACTIVITIES: CPT

## 2020-08-06 PROCEDURE — 6370000000 HC RX 637 (ALT 250 FOR IP): Performed by: INTERNAL MEDICINE

## 2020-08-06 RX ORDER — FUROSEMIDE 10 MG/ML
40 INJECTION INTRAMUSCULAR; INTRAVENOUS ONCE
Status: COMPLETED | OUTPATIENT
Start: 2020-08-06 | End: 2020-08-06

## 2020-08-06 RX ADMIN — CEFTRIAXONE SODIUM 1 G: 1 INJECTION, POWDER, FOR SOLUTION INTRAMUSCULAR; INTRAVENOUS at 09:21

## 2020-08-06 RX ADMIN — MIRTAZAPINE 45 MG: 15 TABLET, FILM COATED ORAL at 21:43

## 2020-08-06 RX ADMIN — ALBUTEROL SULFATE 2 PUFF: 108 AEROSOL, METERED RESPIRATORY (INHALATION) at 08:39

## 2020-08-06 RX ADMIN — POTASSIUM & SODIUM PHOSPHATES POWDER PACK 280-160-250 MG 250 MG: 280-160-250 PACK at 21:43

## 2020-08-06 RX ADMIN — QUETIAPINE FUMARATE 100 MG: 100 TABLET ORAL at 21:43

## 2020-08-06 RX ADMIN — ALBUTEROL SULFATE 2 PUFF: 108 AEROSOL, METERED RESPIRATORY (INHALATION) at 16:20

## 2020-08-06 RX ADMIN — ALBUTEROL SULFATE 2 PUFF: 108 AEROSOL, METERED RESPIRATORY (INHALATION) at 11:59

## 2020-08-06 RX ADMIN — POTASSIUM & SODIUM PHOSPHATES POWDER PACK 280-160-250 MG 250 MG: 280-160-250 PACK at 17:19

## 2020-08-06 RX ADMIN — SODIUM CHLORIDE, PRESERVATIVE FREE 10 ML: 5 INJECTION INTRAVENOUS at 21:44

## 2020-08-06 RX ADMIN — POTASSIUM & SODIUM PHOSPHATES POWDER PACK 280-160-250 MG 250 MG: 280-160-250 PACK at 14:43

## 2020-08-06 RX ADMIN — SODIUM CHLORIDE, PRESERVATIVE FREE 10 ML: 5 INJECTION INTRAVENOUS at 09:23

## 2020-08-06 RX ADMIN — IPRATROPIUM BROMIDE 2 PUFF: 17 AEROSOL, METERED RESPIRATORY (INHALATION) at 16:20

## 2020-08-06 RX ADMIN — IPRATROPIUM BROMIDE 2 PUFF: 17 AEROSOL, METERED RESPIRATORY (INHALATION) at 11:59

## 2020-08-06 RX ADMIN — METOPROLOL SUCCINATE 25 MG: 25 TABLET, EXTENDED RELEASE ORAL at 09:21

## 2020-08-06 RX ADMIN — IPRATROPIUM BROMIDE 2 PUFF: 17 AEROSOL, METERED RESPIRATORY (INHALATION) at 08:39

## 2020-08-06 RX ADMIN — QUETIAPINE FUMARATE 100 MG: 100 TABLET ORAL at 09:21

## 2020-08-06 RX ADMIN — POTASSIUM & SODIUM PHOSPHATES POWDER PACK 280-160-250 MG 250 MG: 280-160-250 PACK at 09:21

## 2020-08-06 RX ADMIN — FUROSEMIDE 40 MG: 10 INJECTION, SOLUTION INTRAMUSCULAR; INTRAVENOUS at 10:00

## 2020-08-06 RX ADMIN — ALBUTEROL SULFATE 2 PUFF: 108 AEROSOL, METERED RESPIRATORY (INHALATION) at 20:50

## 2020-08-06 RX ADMIN — IPRATROPIUM BROMIDE 2 PUFF: 17 AEROSOL, METERED RESPIRATORY (INHALATION) at 20:50

## 2020-08-06 ASSESSMENT — PAIN SCALES - GENERAL
PAINLEVEL_OUTOF10: 0
PAINLEVEL_OUTOF10: 0

## 2020-08-06 NOTE — CARE COORDINATION
LSW received a call from [address] [facility] and pt has been approved to go to the NH. Doctor aware. LSW completed a PASS and placed copy in the NH packet. CM will need a ANIL competed by RN and doctor at discharge. If pt is discharged after hours please complete the following. ... Call report to [phone]   Fax completed AVS with both ANIL on the AVS and any written Rx [phone]. Set up transportation (pt's choice) [facility] [phone] or [facility] [phone] and call family.

## 2020-08-06 NOTE — PROGRESS NOTES
toileting task mod A on BSC/regular toilet  7. Pt will complete oral hygiene/grooming routine in unsupported sitting EOB SBA with setup  8.  Pt will complete ther ex/ther act with focus on UE strengthening, functional standing tolerance >5 minutes, dynamic sitting balance/neuro re-ed tasks

## 2020-08-06 NOTE — DISCHARGE INSTR - COC
94%   BMI 30.93 kg/m²     Last documented pain score (0-10 scale): Pain Level: 0  Last Weight:   Wt Readings from Last 1 Encounters:   08/06/20 203 lb 6.4 oz (92.3 kg)     Mental Status:  alert    IV Access:  - None    Nursing Mobility/ADLs:  Walking   Dependent  Transfer  Dependent  Bathing  Dependent  Dressing  Dependent  Toileting  Dependent  Feeding  Independent  Med Admin  Assisted  Med Delivery   whole    Wound Care Documentation and Therapy:        Elimination:  Continence:   · Bowel: No  · Bladder: No  Urinary Catheter: None   Colostomy/Ileostomy/Ileal Conduit: No       Date of Last BM:     Intake/Output Summary (Last 24 hours) at 8/6/2020 1104  Last data filed at 8/6/2020 0238  Gross per 24 hour   Intake 10 ml   Output 1000 ml   Net -990 ml     I/O last 3 completed shifts: In: 20 [I.V.:20]  Out: 1000 [Urine:1000]    Safety Concerns:     [address] ANIL Safety Concerns:[id number]}    Impairments/Disabilities:      None      Patient's personal belongings (please select all that are sent with patient):  None    RN SIGNATURE:  Electronically signed by Alysa Cervantes RN on 8/13/20 at 2:37 PM EDT      PHYSICIAN SECTION    Prognosis: Good    Condition at Discharge: Stable    Rehab Potential (if transferring to Rehab): Good    Recommended Labs or Other Treatments After Discharge: None    Physician Certification: I certify the above information and transfer of Shanda Kumar  is necessary for the continuing treatment of the diagnosis listed and that he requires [facility] for greater 30 days.      Update Admission H&P: No change in H&P     Nutrition Therapy:  Current Nutrition Therapy:   - Oral Diet:  General mildly thick    Routes of Feeding: Oral  Liquids: No Restrictions  Daily Fluid Restriction: no  Last Modified Barium Swallow with Video (Video Swallowing Test): not done    Treatments at the Time of Hospital Discharge:   Respiratory Treatments:   Oxygen Therapy:  is on oxygen at 1 L/min per nasal cannula.   Ventilator:    - No ventilator support    Rehab Therapies: Physical Therapy and Occupational Therapy  Weight Bearing Status/Restrictions: No weight bearing restirctions  Other Medical Equipment (for information only, NOT a DME order):  wheelchair, crutches and walker  Other Treatments: None    PHYSICIAN SIGNATURE:  Electronically signed by Juliette Gandhi MD on 8/13/20 at 2:28 PM EDT

## 2020-08-06 NOTE — CARE COORDINATION
QUINNW spoke with Jimena Bull with [facility] and they can take pt once the precert is received. Precert pending.

## 2020-08-06 NOTE — PROGRESS NOTES
[id number] [place] OF SPEECH/LANGUAGE PATHOLOGY  DAILY PROGRESS NOTE  Bessie Rico  8/6/2020  [id number]  Pneumonia due to organism [J18.9]  SOB (shortness of breath) [R06.02]  Septicemia ([address] [A41.9]  General weakness [R53.1]  No Known Allergies      Pt was seen this date for dysphagia treatment. IMPRESSION AND RECOMMENDATIONS: Bessie Rico was seen for dysphagia follow-up for diet tolerance monitoring and education. Pt requests allowance for water, states he is thirsty. Education provided re: assessment yesterday, concerns for aspiration, plan for reassessment. He accepted PO trials of ice chips, thin liquids via cup/straw, nectar thick liquids via cup/straw, regular solids. Oral stage WFL with intact mastication, AP transit, clearance. Continued suspected pharyngeal dysphagia with delayed swallow initiation, immediate cough following 5/5 trials of thin liquids. Education/cuing provided re: diet recommendations, compensatory strategies/aspiration precautions. Pt indicated understanding, will benefit from reinforcement. Recommend continued current diet. SLP will follow. GOALS (current status in bold):  Short-term Goals  Timeframe for Short-term Goals: length of admission  Goal 1: Pt will tolerate regular diet/nectar thick liquids with adequate oral manipulation/clearance and no s/s aspiration. Meeting, continue  Goal 2: Pt will tolerate trials of advanced textures with adequate oral manipulation and no s/s aspiration for possible diet upgrade. Not met, overt cough 100% trials of thin liquids  Goal 3: Pt will participate in MBSS as appropriate. Not indicated at this time  Goal 4: Pt/caregivers will indicate understanding of all recommendations.  Ongoing, RN aware of recommendations, pt needs reinforcement      EDUCATION: as above    PAIN RATING (0-10 Scale): 0/denies  Time in/Time out: SLP Individual Minutes  Time In: 1355  Time Out: 1410  Minutes: 15    Visit number: 2      Verito Hodges MA CCC-SLP  8/6/2020  3:19 PM

## 2020-08-06 NOTE — PROGRESS NOTES
Hospitalist Progress Note      Name:  Mamta Brown /Age/Sex: 1932  (80 y.o. male)   MRN & CSN:  [id number] & [id number] Admission Date/Time: 2020 11:57 PM   Location:  3242/0715- PCP: Herold Libman, MD         Hospital Day: 6    Assessment and Plan:   Mamta Brown is a 80 y.o.  male  who presents with SOB (shortness of breath)    1. Pneumonia:  · Chest x-ray with right upper lobe airspace opacity  · On oxygen by nasal cannula at 1 to 2 L/min  · Last febrile episode 101 Fahrenheit August 3, 2020, WBC trending down. · Completed Rocephin and azithromycin. · Respiratory disease panel negative, urine Legionella urine Streptococcus negative  · Repeat chest x-ray today with trace right pleural effusion and pulmonary vascular congestion  · Lasix IV given. · Blood culture negative. 2. Acute kidney injury on chronic kidney disease stage II  · Creatinine 1.5.,  Baseline 1.4. Baseline 1.4. Avoid nephrotoxic agent. 3. Hypertension: Blood pressure controlled. Continue metoprolol. 4. Elevated liver function tests: Elevation started once antibiotics were started. Could be medication induced. Hepatitis panel negative. Ultrasound with cholelithiasis, normal liver echogenicity. Will check hepatic function tomorrow. Diet No diet orders on file   DVT Prophylaxis [x] Lovenox, []  Heparin, [] SCDs, [] Warfarin  [] NOAC     GI Prophylaxis [] PPI,  [] H2 Blocker,  [] Carafate,  [x] Diet/Tube Feeds   Code Status Full Code   MDM [] Low, [x] Moderate,[]  High     History of Present Illness:     Chief Complaint: SOB (shortness of breath)    Seen and examined today. Feels better. No cough. Has mild shortness of breath. No fever or chills. No abdominal pain. No nausea or vomiting. Ten point ROS reviewed negative, unless as noted above    Objective:        Intake/Output Summary (Last 24 hours) at 2020 1329  Last data filed at 2020 0238  Gross per 24 hour   Intake 10 ml   Output 1000 ml   Net -990 ml      Vitals:   Vitals:    08/06/20 1201   BP:    Pulse:    Resp: 16   Temp:    SpO2: 94%     Physical Exam:   GEN Awake male, sitting upright in bed in no apparent distress. Appears given age. EYES Pupils are equally round. No scleral erythema, discharge, or conjunctivitis. HENT Mucous membranes are moist. Oral pharynx without exudates, no evidence of thrush. NECK Supple, no apparent thyromegaly or masses. RESP occasional crackles. Resolving. CARDIO/VASC S1/S2 auscultated. Regular rate without appreciable murmurs, rubs, or gallops. No JVD or carotid bruits. Peripheral pulses equal bilaterally and palpable. No peripheral edema. GI Abdomen is soft without significant tenderness, masses, or guarding. Bowel sounds are normoactive. Rectal exam deferred. MSK No gross joint deformities. SKIN Normal coloration, warm, dry. NEURO Cranial nerves appear grossly intact, normal speech, no lateralizing weakness. PSYCH Awake, alert, oriented x 4. Affect appropriate.     Medications:   Medications:    potassium & sodium phosphates  1 packet Oral 4x Daily    mirtazapine  45 mg Oral Nightly    QUEtiapine  100 mg Oral BID    metoprolol succinate  25 mg Oral Daily    albuterol sulfate HFA  2 puff Inhalation 4x daily    ipratropium  2 puff Inhalation 4x daily    sodium chloride flush  10 mL Intravenous 2 times per day    enoxaparin  30 mg Subcutaneous Daily      Infusions:   PRN Meds: cloNIDine, 0.1 mg, Q6H PRN  sodium chloride flush, 10 mL, PRN  acetaminophen, 650 mg, Q6H PRN    Or  acetaminophen, 650 mg, Q6H PRN  polyethylene glycol, 17 g, Daily PRN  promethazine, 12.5 mg, Q6H PRN    Or  ondansetron, 4 mg, Q6H PRN        Recent Labs     08/04/20  1050 08/05/20  0323   WBC 11.1* 11.1*   HGB 10.7* 11.3*   HCT 34.5* 36.9*    164      Recent Labs     08/04/20  1050 08/05/20  0323 08/06/20  0914    145 140   K 4.0 4.2 4.8    110 107   CO2 25 24 20*   PHOS 1.9* 2.4*  --    BUN 40* 36* 32*   CREATININE 1.8* 1.5* 1.3     Recent Labs     08/04/20  1050 08/05/20  0323 08/06/20  0914   AST 66* 120* 134*   ALT 41* 96* 138*   BILITOT 0.5 0.4 0.4   ALKPHOS 135* 167* 202*     Recent Labs     08/04/20  1050   INR 1.15     No results for input(s): CKTOTAL, CKMB, CKMBINDEX, TROPONINT in the last 72 hours.      Imaging reviewed      Electronically signed by Isiah Parikh MD on 8/6/2020 at 1:29 PM

## 2020-08-07 LAB
ALBUMIN SERPL-MCNC: 2.9 GM/DL (ref 3.4–5)
ALP BLD-CCNC: 274 IU/L (ref 40–129)
ALT SERPL-CCNC: 173 U/L (ref 10–40)
ANION GAP SERPL CALCULATED.3IONS-SCNC: 15 MMOL/L (ref 4–16)
AST SERPL-CCNC: 133 IU/L (ref 15–37)
BILIRUB SERPL-MCNC: 0.5 MG/DL (ref 0–1)
BUN BLDV-MCNC: 34 MG/DL (ref 6–23)
CALCIUM SERPL-MCNC: 8.3 MG/DL (ref 8.3–10.6)
CHLORIDE BLD-SCNC: 101 MMOL/L (ref 99–110)
CO2: 25 MMOL/L (ref 21–32)
CREAT SERPL-MCNC: 1.5 MG/DL (ref 0.9–1.3)
CULTURE: NORMAL
CULTURE: NORMAL
GFR AFRICAN AMERICAN: 54 ML/MIN/1.73M2
GFR NON-AFRICAN AMERICAN: 44 ML/MIN/1.73M2
GLUCOSE BLD-MCNC: 112 MG/DL (ref 70–99)
Lab: NORMAL
Lab: NORMAL
POTASSIUM SERPL-SCNC: 4 MMOL/L (ref 3.5–5.1)
SODIUM BLD-SCNC: 141 MMOL/L (ref 135–145)
SPECIMEN: NORMAL
SPECIMEN: NORMAL
TOTAL PROTEIN: 5.7 GM/DL (ref 6.4–8.2)

## 2020-08-07 PROCEDURE — 80053 COMPREHEN METABOLIC PANEL: CPT

## 2020-08-07 PROCEDURE — 6370000000 HC RX 637 (ALT 250 FOR IP): Performed by: INTERNAL MEDICINE

## 2020-08-07 PROCEDURE — 94761 N-INVAS EAR/PLS OXIMETRY MLT: CPT

## 2020-08-07 PROCEDURE — 1200000000 HC SEMI PRIVATE

## 2020-08-07 PROCEDURE — 94640 AIRWAY INHALATION TREATMENT: CPT

## 2020-08-07 PROCEDURE — 36415 COLL VENOUS BLD VENIPUNCTURE: CPT

## 2020-08-07 PROCEDURE — 97530 THERAPEUTIC ACTIVITIES: CPT

## 2020-08-07 PROCEDURE — 97112 NEUROMUSCULAR REEDUCATION: CPT

## 2020-08-07 PROCEDURE — 2580000003 HC RX 258: Performed by: INTERNAL MEDICINE

## 2020-08-07 RX ORDER — FUROSEMIDE 40 MG/1
40 TABLET ORAL DAILY
Qty: 3 TABLET | Refills: 0 | Status: ON HOLD | OUTPATIENT
Start: 2020-08-07 | End: 2020-09-19 | Stop reason: HOSPADM

## 2020-08-07 RX ORDER — OXYCODONE HYDROCHLORIDE AND ACETAMINOPHEN 5; 325 MG/1; MG/1
1 TABLET ORAL EVERY 4 HOURS PRN
Status: DISCONTINUED | OUTPATIENT
Start: 2020-08-07 | End: 2020-08-13 | Stop reason: HOSPADM

## 2020-08-07 RX ADMIN — POTASSIUM & SODIUM PHOSPHATES POWDER PACK 280-160-250 MG 250 MG: 280-160-250 PACK at 09:07

## 2020-08-07 RX ADMIN — QUETIAPINE FUMARATE 100 MG: 100 TABLET ORAL at 09:07

## 2020-08-07 RX ADMIN — IPRATROPIUM BROMIDE 2 PUFF: 17 AEROSOL, METERED RESPIRATORY (INHALATION) at 08:50

## 2020-08-07 RX ADMIN — POTASSIUM & SODIUM PHOSPHATES POWDER PACK 280-160-250 MG 250 MG: 280-160-250 PACK at 20:18

## 2020-08-07 RX ADMIN — OXYCODONE HYDROCHLORIDE AND ACETAMINOPHEN 1 TABLET: 5; 325 TABLET ORAL at 12:47

## 2020-08-07 RX ADMIN — ALBUTEROL SULFATE 2 PUFF: 108 AEROSOL, METERED RESPIRATORY (INHALATION) at 20:44

## 2020-08-07 RX ADMIN — POTASSIUM & SODIUM PHOSPHATES POWDER PACK 280-160-250 MG 250 MG: 280-160-250 PACK at 17:38

## 2020-08-07 RX ADMIN — SODIUM CHLORIDE, PRESERVATIVE FREE 10 ML: 5 INJECTION INTRAVENOUS at 09:07

## 2020-08-07 RX ADMIN — IPRATROPIUM BROMIDE 2 PUFF: 17 AEROSOL, METERED RESPIRATORY (INHALATION) at 12:36

## 2020-08-07 RX ADMIN — QUETIAPINE FUMARATE 100 MG: 100 TABLET ORAL at 20:18

## 2020-08-07 RX ADMIN — ALBUTEROL SULFATE 2 PUFF: 108 AEROSOL, METERED RESPIRATORY (INHALATION) at 12:37

## 2020-08-07 RX ADMIN — ALBUTEROL SULFATE 2 PUFF: 108 AEROSOL, METERED RESPIRATORY (INHALATION) at 08:49

## 2020-08-07 RX ADMIN — MIRTAZAPINE 45 MG: 15 TABLET, FILM COATED ORAL at 20:18

## 2020-08-07 RX ADMIN — POTASSIUM & SODIUM PHOSPHATES POWDER PACK 280-160-250 MG 250 MG: 280-160-250 PACK at 12:47

## 2020-08-07 RX ADMIN — SODIUM CHLORIDE, PRESERVATIVE FREE 10 ML: 5 INJECTION INTRAVENOUS at 20:18

## 2020-08-07 RX ADMIN — METOPROLOL SUCCINATE 25 MG: 25 TABLET, EXTENDED RELEASE ORAL at 09:07

## 2020-08-07 RX ADMIN — IPRATROPIUM BROMIDE 2 PUFF: 17 AEROSOL, METERED RESPIRATORY (INHALATION) at 20:44

## 2020-08-07 ASSESSMENT — PAIN SCALES - GENERAL
PAINLEVEL_OUTOF10: 4
PAINLEVEL_OUTOF10: 0

## 2020-08-07 NOTE — PROGRESS NOTES
Pulmonary and Critical Care  Progress Note    Subjective: The patient has improved. Shortness of breath better. Chest pain none. Addressing respiratory complaints Patient is negative for  hemoptysis and cyanosis  CONSTITUTIONAL:  negative for fevers and chills      Past Medical History:     has a past medical history of Anxiety, Depression, Hypertension, and Nerves. has a past surgical history that includes joint replacement. reports that he quit smoking about 45 years ago. His smokeless tobacco use includes chew. He reports that he does not drink alcohol or use drugs. Family history:  family history includes Cancer in his father, mother, and sister. No Known Allergies  Social History:    Reviewed; no changes    Objective:   PHYSICAL EXAM:        VITALS:  BP (!) 122/45   Pulse 78   Temp 98.4 °F (36.9 °C) (Oral)   Resp 16   Ht 5' 8\" (1.727 m)   Wt 196 lb 11.2 oz (89.2 kg)   SpO2 91%   BMI 29.91 kg/m²     24HR INTAKE/OUTPUT:      Intake/Output Summary (Last 24 hours) at 8/7/2020 1153  Last data filed at 8/7/2020 5064  Gross per 24 hour   Intake --   Output 2750 ml   Net -2750 ml       CONSTITUTIONAL:  Awake. LUNGS:  decreased breath sounds, occ basilar crackles. CARDIOVASCULAR:  normal S1 and S2 and negative JVD  ABD:Abdomen soft, non-tender. BS normal. No masses,  No organomegaly  NEURO:Alert. DATA:    CBC:  Recent Labs     08/05/20  0323   WBC 11.1*   RBC 4.03*   HGB 11.3*   HCT 36.9*      MCV 91.6   MCH 28.0   MCHC 30.6*   RDW 14.7   SEGSPCT 82.0*      BMP:  Recent Labs     08/05/20  0323 08/06/20  0914 08/07/20  0955    140 141   K 4.2 4.8 4.0    107 101   CO2 24 20* 25   BUN 36* 32* 34*   CREATININE 1.5* 1.3 1.5*   CALCIUM 7.7* 7.9* 8.3   GLUCOSE 117* 99 112*      ABG:  No results for input(s): PH, PO2ART, [id number], HCO3, BEART, O2SAT in the last 72 hours.   Lab Results   Component Value Date    PROBNP 1,687 (H) 08/05/2020    PROBNP 1,904 (H) 08/04/2020    PROBNP 542.4 (H) 08/02/2020     No results found for: [facility]    Radiology Review:  Pertinent images / reports were reviewed as a part of this visit. Assessment:     Patient Active Problem List   Diagnosis    Pneumonia due to organism    [facility]F (acute [facility] failure) [facility]    Essential hypertension, benign    Depressive disorder, not elsewhere classified    Acute renal failure ([facility]    Metabolic encephalopathy    SOB (shortness of breath)       Plan:   1. Overall the patient has improved. 2. Satnam 6 Activity.     [address] [facility] [address] MD  8/7/2020  11:53 AM

## 2020-08-07 NOTE — PROGRESS NOTES
Occupational Therapy  . Occupational Therapy Treatment Note  Name: Amelia Davila MRN: [id number] :   1932   Date:  2020   Admission Date: 2020 Room:  [id number]-A   Restrictions/Precautions:    General precautions; Fall Risk    Communication with other providers: Per chart review  And Nurse Aydin Oliva, patient is appropriate for therapeutic intervention. Partial co-Tx c PTA Remedios  c this therapist assisting pt for ice chips per whiteboard note that pt is allowed ice chips and is on [place] Thick for liquids. Nurse Aydin Oliva notified for pt's request for something to drink. Communicated c nurse tech Real Spencer who reports pt transferred c RW c Mod A + SBA of another plus four steps to chair. Nurse tech arrived to assist c 5th attempt for stand while pt was re-positioned fully forbuilt up surface for comfort and elevation, required Max A/Dep x2 + CGA of 3rd person for walker stability. Subjective:  Patient states: \"My bottom hurts\" Pt agreeable to Tx session. Pt reports does not remember getting up c nurse tech to chair. Pain:   Location, Type, Intensity (0/10 to 10/10):  Unrated pain, pt did not rate numerically in spite of choices, identifies pain in tail bone. Objective:    Observation:  Pt received in L-sided lean seated in chair on approach. A&O x3, not current date. Stiff and rigid trunk and limbs. Objective Measures:  Telemetry, O2 >95% on room air during Tx session    Treatment, including education:  Therapeutic Activity Training:   Therapeutic activity training was instructed today. Cues were given for safety, sequence, UE/LE placement, awareness, and balance. Activities performed today included transfer training for sit<>stands and for repositioning for midline alignment. Sit<>stands: Max A / Dep x2 for total of 4 attempts and only partial stands, first c RW x2 trials, and then for two attempts c chair positioned in front of raised bed rails.  Pt able to perform partial stands only long enough for placement of pillows [id number] to facilitate less pressure on tail bone and elevated surface in chair. Pt required rest breaks between. All therapeutic intervention performed c emphasis on neuro re-education / dynamic balance / standing tolerance to inc strength, endurance and act tolerance for inc Indep c ADL tasks, func transfers / mobility. Safety  Patient safely in bedside chair + alarm at end of session, with call light/phone in reach, and nursing aware. Gait belt was used for func transfers. Pt positioned c support to L-side to facilitate midline alignment and was left in reclining position. Assessment / Impression:        Patient's tolerance of treatment:  Fair   Adverse Reaction: None  Significant change in status and impact:  None  Barriers to improvement:  Decreased cognition, stiffness and rigidity, decreased balance, decreased strength and endurance    Plan for Next Session:    Continue per OT POC c emphasis on transfer training, would benefit from co-Tx. Time in:  1005  Time out:  1048  Timed treatment minutes:  43  Total treatment time:  43    Electronically signed by:    CHRISS Umana  8/7/2020, 10:06 AM    Previously filed values:       Goals:  1. Pt will complete all aspects of bed mobility for EOB/OOB ADLs min A  2. Pt will complete UB/LB bathing mod A with setup using long handled sponge PRN  3. Pt will complete all aspects of LB dressing mod A with setup using AE PRN  4. Pt will complete all functional transfers to and from bed, chair, toilet, shower chair min A/good safety awareness  5. Pt will ambulate HH distance to bathroom for toileting mod A x 2 with RW  6. Pt will complete all aspects of toileting task mod A on BSC/regular toilet  7. Pt will complete oral hygiene/grooming routine in unsupported sitting EOB SBA with setup  8.  Pt will complete ther ex/ther act with focus on UE strengthening, functional standing tolerance >5 minutes, dynamic sitting balance/neuro re-ed tasks

## 2020-08-07 NOTE — PROGRESS NOTES
Consult received - full note to follow   LFT's normal on admission and now elevated  Suspect due to meds, congestion/ischemia, possible CBD stone  Suggest:   1) as antibiotics now D/C'ed will follow LFT's-- if continue to rise will do MRCP

## 2020-08-07 NOTE — PROGRESS NOTES
Hospitalist Progress Note      Name:  Idania Mai /Age/Sex: 1932  (80 y.o. male)   MRN & CSN:  [id number] & [id number] Admission Date/Time: 2020 11:57 PM   Location:  [address] [place] [address] PCP: Dary Castillo MD         Hospital Day: 7    Assessment and Plan:   Idania Mai is a 80 y.o.  male  who presents with SOB (shortness of breath)    1. Pneumonia:  · Chest x-ray with right upper lobe airspace opacity  · On oxygen by nasal cannula at 1 to 2 L/min  · Last febrile episode 101 Fahrenheit August 3, 2020, WBC trending down. · Completed Rocephin and azithromycin. · Respiratory disease panel negative, urine Legionella urine Streptococcus negative  · Repeat chest x-ray with trace right pleural effusion and pulmonary vascular congestion -given Lasix. Will be discharged on Lasix for 3 days. · Blood culture negative. 2. Acute kidney injury on chronic kidney disease stage II  · Creatinine 1.5.,  Baseline 1.4. Avoid nephrotoxic agent. 3. Hypertension: Blood pressure controlled. Continue metoprolol. 4. Elevated liver function tests: Elevation started once antibiotics were started. Could be medication induced. Hepatitis panel negative. Ultrasound with cholelithiasis, normal liver echogenicity. -Liver function tests are still getting worse. Gastroenterology on consult. Diet DIET GENERAL; Mildly Thick (Nectar)   DVT Prophylaxis [x] Lovenox, []  Heparin, [] SCDs, [] Warfarin  [] NOAC     GI Prophylaxis [] PPI,  [] H2 Blocker,  [] Carafate,  [x] Diet/Tube Feeds   Code Status Full Code   MDM [] Low, [x] Moderate,[]  High     History of Present Illness:     Chief Complaint: SOB (shortness of breath)    Seen and examined today. Has occasional cough. No shortness of breath. No fever or chills. Complaining of right shoulder and hip pain -chronic according to the daughter. Ten point ROS reviewed negative, unless as noted above    Objective:        Intake/Output Summary (Last 24 hours) at 8/7/2020 1114  Last data filed at 8/7/2020 8418  Gross per 24 hour   Intake --   Output 2750 ml   Net -2750 ml      Vitals:   Vitals:    08/07/20 0910   BP: (!) 122/45   Pulse: 78   Resp: 16   Temp: 98.4 °F (36.9 °C)   SpO2: 91%     Physical Exam:   GEN Awake male, sitting upright in bed in no apparent distress. Appears given age. EYES Pupils are equally round. No scleral erythema, discharge, or conjunctivitis. HENT Mucous membranes are moist. Oral pharynx without exudates, no evidence of thrush. NECK Supple, no apparent thyromegaly or masses. RESP occasional crackles. Resolving. CARDIO/VASC S1/S2 auscultated. Regular rate without appreciable murmurs, rubs, or gallops. No JVD or carotid bruits. Peripheral pulses equal bilaterally and palpable. No peripheral edema. GI Abdomen is soft without significant tenderness, masses, or guarding. Bowel sounds are normoactive. Rectal exam deferred. MSK No gross joint deformities. SKIN Normal coloration, warm, dry. NEURO Cranial nerves appear grossly intact, normal speech, no lateralizing weakness. PSYCH Awake, alert, oriented x 4. Affect appropriate.     Medications:   Medications:    potassium & sodium phosphates  1 packet Oral 4x Daily    mirtazapine  45 mg Oral Nightly    QUEtiapine  100 mg Oral BID    metoprolol succinate  25 mg Oral Daily    albuterol sulfate HFA  2 puff Inhalation 4x daily    ipratropium  2 puff Inhalation 4x daily    sodium chloride flush  10 mL Intravenous 2 times per day    enoxaparin  30 mg Subcutaneous Daily      Infusions:   PRN Meds: cloNIDine, 0.1 mg, Q6H PRN  sodium chloride flush, 10 mL, PRN  acetaminophen, 650 mg, Q6H PRN    Or  acetaminophen, 650 mg, Q6H PRN  polyethylene glycol, 17 g, Daily PRN  promethazine, 12.5 mg, Q6H PRN    Or  ondansetron, 4 mg, Q6H PRN        Recent Labs     08/05/20 0323   WBC 11.1*   HGB 11.3*   HCT 36.9*         Recent Labs     08/05/20  0323 08/06/20  0914 08/07/20  0955    140 141 K 4.2 4.8 4.0    107 101   CO2 24 20* 25   PHOS 2.4*  --   --    BUN 36* 32* 34*   CREATININE 1.5* 1.3 1.5*     Recent Labs     08/05/20  0323 08/06/20  0914 08/07/20  0955   * 134* 133*   ALT 96* 138* 173*   BILITOT 0.4 0.4 0.5   ALKPHOS 167* 202* 274*     Imaging reviewed    Electronically signed by Gin Alba MD on 8/7/2020 at 11:14 AM

## 2020-08-07 NOTE — PROGRESS NOTES
Physical Therapy    Physical Therapy Treatment Note  Name: Trudi Goldstein MRN: [id number] :   1932   Date:  2020   Admission Date: 2020 Room:  [address] [place] [address]-A   Restrictions/Precautions:        diagnosis was Pneumonia due to organism  Communication with other providers:  Per nurse ok to tx. Tech came to room and reports pt did well getting out of bed with mod assist of one and sba of one  earlier and took 4 steps with walker. Tech assisted with 4 th attempt at standing but max/dependent of 2 and cga of one. Subjective:  Patient states:  Pt agreeable to tx  Pain:   Location, Type, Intensity (0/10 to 10/10):  C/o bottom sore  Objective:    Observation:  Alert and oriented to self but not date. Pt having lean to left while in sitting needing increased effort to get to good alinement. Pt  reports unable to remember transfer out of bed to chair. Treatment, including education/measures:  Attempted sit to stand from chair to walker max/dependent of 2 and then turned chair to face bed rail and attempted 2 more times with max/dependents of 2 but only able to clear bottom and place pillows. Safety  Patient left safely in the chair, with call light/phone in reach with alarm applied. Gait belt was used for transfers and gait. Assessment / Impression:       Patient's tolerance of treatment:  fair  Adverse Reaction: na  Significant change in status and impact:  na  Barriers to improvement:  Pt has poor memory and is very stiff and ridged needing increased time and effort with all mobility   Plan for Next Session:    Cont.  POC  Time in:  1005  Time out:  1040  Timed treatment minutes:  35  Total treatment time:  35    Previously filed items:  Social/Functional History  Lives With: Son  Type of Home: House  Home Layout: Two level  Home Access: Stairs to enter with rails  Entrance Stairs - Number of Steps: Pt uncertain  Entrance Stairs - Rails: Both  Home Equipment: Cane, Rolling walker  ADL Assistance: Independent  Homemaking Assistance: Independent  Ambulation Assistance: Independent(mod I with cane or RW)  Transfer Assistance: Independent  Active : No(Son drives)  Occupation: Retired  Short term goals  Time Frame for [employer] term goals: 1 week  Short term goal 1: Patient will complete bed mobility Min A  Short term goal 2: Patient will perform STS x5 with LRAD and Mod A. Short term goal 3: Patient will ambulate x6ft with LRAD and Mod A.        Electronically signed by:    Burak Roldan PTA  8/7/2020, 8:27 AM

## 2020-08-08 ENCOUNTER — APPOINTMENT (OUTPATIENT)
Dept: GENERAL RADIOLOGY | Age: 85
DRG: 871 | End: 2020-08-08
Payer: MEDICARE

## 2020-08-08 LAB
ALBUMIN SERPL-MCNC: 2.6 GM/DL (ref 3.4–5)
ALP BLD-CCNC: 230 IU/L (ref 40–129)
ALT SERPL-CCNC: 135 U/L (ref 10–40)
ANION GAP SERPL CALCULATED.3IONS-SCNC: 17 MMOL/L (ref 4–16)
AST SERPL-CCNC: 81 IU/L (ref 15–37)
BILIRUB SERPL-MCNC: 0.5 MG/DL (ref 0–1)
BILIRUBIN DIRECT: 0.2 MG/DL (ref 0–0.3)
BILIRUBIN, INDIRECT: 0.3 MG/DL (ref 0–0.7)
BUN BLDV-MCNC: 39 MG/DL (ref 6–23)
CALCIUM SERPL-MCNC: 7.9 MG/DL (ref 8.3–10.6)
CHLORIDE BLD-SCNC: 100 MMOL/L (ref 99–110)
CO2: 19 MMOL/L (ref 21–32)
CREAT SERPL-MCNC: 1.4 MG/DL (ref 0.9–1.3)
GFR AFRICAN AMERICAN: 58 ML/MIN/1.73M2
GFR NON-AFRICAN AMERICAN: 48 ML/MIN/1.73M2
GLUCOSE BLD-MCNC: 107 MG/DL (ref 70–99)
HCT VFR BLD CALC: 40.8 % (ref 42–52)
HEMOGLOBIN: 11.5 GM/DL (ref 13.5–18)
MCH RBC QN AUTO: 28.1 PG (ref 27–31)
MCHC RBC AUTO-ENTMCNC: 28.2 % (ref 32–36)
MCV RBC AUTO: 99.8 FL (ref 78–100)
PDW BLD-RTO: 15.6 % (ref 11.7–14.9)
PLATELET # BLD: 188 K/CU MM (ref 140–440)
PMV BLD AUTO: 10.5 FL (ref 7.5–11.1)
POTASSIUM SERPL-SCNC: 4.3 MMOL/L (ref 3.5–5.1)
RBC # BLD: 4.09 M/CU MM (ref 4.6–6.2)
SODIUM BLD-SCNC: 136 MMOL/L (ref 135–145)
TOTAL PROTEIN: 5.2 GM/DL (ref 6.4–8.2)
WBC # BLD: 6.8 K/CU MM (ref 4–10.5)

## 2020-08-08 PROCEDURE — 71045 X-RAY EXAM CHEST 1 VIEW: CPT

## 2020-08-08 PROCEDURE — 6370000000 HC RX 637 (ALT 250 FOR IP): Performed by: INTERNAL MEDICINE

## 2020-08-08 PROCEDURE — 2580000003 HC RX 258: Performed by: INTERNAL MEDICINE

## 2020-08-08 PROCEDURE — 80048 BASIC METABOLIC PNL TOTAL CA: CPT

## 2020-08-08 PROCEDURE — 85027 COMPLETE CBC AUTOMATED: CPT

## 2020-08-08 PROCEDURE — 6360000002 HC RX W HCPCS: Performed by: INTERNAL MEDICINE

## 2020-08-08 PROCEDURE — 80076 HEPATIC FUNCTION PANEL: CPT

## 2020-08-08 PROCEDURE — 92526 ORAL FUNCTION THERAPY: CPT

## 2020-08-08 PROCEDURE — 94664 DEMO&/EVAL PT USE INHALER: CPT

## 2020-08-08 PROCEDURE — 2700000000 HC OXYGEN THERAPY PER DAY

## 2020-08-08 PROCEDURE — 1200000000 HC SEMI PRIVATE

## 2020-08-08 PROCEDURE — 36415 COLL VENOUS BLD VENIPUNCTURE: CPT

## 2020-08-08 PROCEDURE — 94640 AIRWAY INHALATION TREATMENT: CPT

## 2020-08-08 PROCEDURE — 94761 N-INVAS EAR/PLS OXIMETRY MLT: CPT

## 2020-08-08 RX ADMIN — ALBUTEROL SULFATE 2 PUFF: 108 AEROSOL, METERED RESPIRATORY (INHALATION) at 20:02

## 2020-08-08 RX ADMIN — IPRATROPIUM BROMIDE 2 PUFF: 17 AEROSOL, METERED RESPIRATORY (INHALATION) at 08:55

## 2020-08-08 RX ADMIN — IPRATROPIUM BROMIDE 2 PUFF: 17 AEROSOL, METERED RESPIRATORY (INHALATION) at 20:02

## 2020-08-08 RX ADMIN — POTASSIUM & SODIUM PHOSPHATES POWDER PACK 280-160-250 MG 250 MG: 280-160-250 PACK at 18:01

## 2020-08-08 RX ADMIN — ALBUTEROL SULFATE 2 PUFF: 108 AEROSOL, METERED RESPIRATORY (INHALATION) at 08:53

## 2020-08-08 RX ADMIN — OXYCODONE HYDROCHLORIDE AND ACETAMINOPHEN 1 TABLET: 5; 325 TABLET ORAL at 22:37

## 2020-08-08 RX ADMIN — SODIUM CHLORIDE, PRESERVATIVE FREE 10 ML: 5 INJECTION INTRAVENOUS at 10:15

## 2020-08-08 RX ADMIN — QUETIAPINE FUMARATE 100 MG: 100 TABLET ORAL at 22:38

## 2020-08-08 RX ADMIN — QUETIAPINE FUMARATE 100 MG: 100 TABLET ORAL at 10:15

## 2020-08-08 RX ADMIN — OXYCODONE HYDROCHLORIDE AND ACETAMINOPHEN 1 TABLET: 5; 325 TABLET ORAL at 14:07

## 2020-08-08 RX ADMIN — POTASSIUM & SODIUM PHOSPHATES POWDER PACK 280-160-250 MG 250 MG: 280-160-250 PACK at 14:07

## 2020-08-08 RX ADMIN — METOPROLOL SUCCINATE 25 MG: 25 TABLET, EXTENDED RELEASE ORAL at 10:15

## 2020-08-08 RX ADMIN — MIRTAZAPINE 45 MG: 15 TABLET, FILM COATED ORAL at 22:38

## 2020-08-08 RX ADMIN — POTASSIUM & SODIUM PHOSPHATES POWDER PACK 280-160-250 MG 250 MG: 280-160-250 PACK at 22:36

## 2020-08-08 RX ADMIN — IPRATROPIUM BROMIDE 2 PUFF: 17 AEROSOL, METERED RESPIRATORY (INHALATION) at 11:51

## 2020-08-08 RX ADMIN — POTASSIUM & SODIUM PHOSPHATES POWDER PACK 280-160-250 MG 250 MG: 280-160-250 PACK at 10:15

## 2020-08-08 RX ADMIN — ALBUTEROL SULFATE 2 PUFF: 108 AEROSOL, METERED RESPIRATORY (INHALATION) at 11:50

## 2020-08-08 RX ADMIN — ENOXAPARIN SODIUM 30 MG: 30 INJECTION SUBCUTANEOUS at 10:15

## 2020-08-08 RX ADMIN — SODIUM CHLORIDE, PRESERVATIVE FREE 10 ML: 5 INJECTION INTRAVENOUS at 22:39

## 2020-08-08 ASSESSMENT — PAIN DESCRIPTION - LOCATION: LOCATION: GENERALIZED

## 2020-08-08 ASSESSMENT — PAIN DESCRIPTION - DESCRIPTORS: DESCRIPTORS: ACHING

## 2020-08-08 ASSESSMENT — PAIN SCALES - GENERAL
PAINLEVEL_OUTOF10: 4
PAINLEVEL_OUTOF10: 2
PAINLEVEL_OUTOF10: 0
PAINLEVEL_OUTOF10: 5

## 2020-08-08 ASSESSMENT — PAIN DESCRIPTION - FREQUENCY: FREQUENCY: INTERMITTENT

## 2020-08-08 ASSESSMENT — PAIN DESCRIPTION - PAIN TYPE: TYPE: ACUTE PAIN

## 2020-08-08 NOTE — CONSULTS
History:   Family History   Problem Relation Age of Onset    Cancer Mother     Cancer Father     Cancer Sister        REVIEW OF SYSTEMS: (POSITIVES WILL BE UNDERLINED)  CONSTITUTIONAL:    Weight change,fatigue, fever, chills  EYES:  Diplopia, change in vision  EARS:  hearing loss, tinnitus, vertigo  NOSE:   epistaxis  MOUTH/THROAT:     hoarseness, sore throat. RESPIRATORY:  SOB,  cough, sputum, hemoptysis  CARDIOVASCULAR : chest pain,palpitations, dyspnea exertion, orthopnea, paroxysmal nocturnal dyspnea, pedal edema. GASTROINTESTINAL:  See HPI  GENITOURINARY:   dysuria, hematuria, . HEMATOLOGIC/LYMPHATIC:   Anemia, bleeding tendencies. MUSCULOSKELETAL:    myalgias,  joint pain  NEUROLOGICAL:   Loss of Consciousness, paresthesias, anesthesias, focal weakness  SKIN :   History of dermatitis, rashes  PSYCHIATRIC:  depression, , anxiety past psychosis  ENDOCRINE:  History of diabetes, thyroid disease  ALL/IMM : allergies, rashes    PHYSICAL EXAM:    Vitals:  BP (!) 136/52   Pulse 68   Temp 98.4 °F (36.9 °C) (Oral)   Resp 22   Ht 5' 8\" (1.727 m)   Wt 201 lb 8 oz (91.4 kg)   SpO2 93%   BMI 30.64 kg/m²   CONSTITUTIONAL: alert, cooperative, no apparent distress,   EYES:  pupils equal, round and reactive to light and sclera clear  ENT:  normocepalic, without obvious abnormality  NECK:  supple, symmetrical, trachea midline  HEMATOLOGIC/LYMPHATICS:  no cervical lymphadenopathy and no supraclavicular lymphadenopathy  LUNGS:  clear to auscultation  CARDIOVASCULAR:  regular rate and rhythm and no murmur noted  ABDOMEN:  Soft, non-tender with normal bowel sounds.  No organomegaly or masses  NEUROLOGIC: no focal deficit detected  SKIN:  no lesions  EXTREMITIES: no clubbing, cyanosis, or edema    IMPRESSION:  1) elevated LFT's- likely related to his pneumonia or meds- seem to be improving  2) gallstones- but clinically not suggestive of CBD stone      RECOMMENDATIONS:  1) as antibiotics now D/C'ed and LFT's improving,will follow LFT's-- if continue to rise will do MRCP        Benny Sharp M.D

## 2020-08-08 NOTE — PROGRESS NOTES
Pulmonary and Critical Care  Progress Note    Subjective: The patient is better. Shortness of breath has improved. Chest pain none. Addressing respiratory complaints Patient is negative for  hemoptysis and cyanosis  CONSTITUTIONAL:  negative for fevers and chills      Past Medical History:     has a past medical history of Anxiety, Depression, Hypertension, and Nerves. has a past surgical history that includes joint replacement. reports that he quit smoking about 45 years ago. His smokeless tobacco use includes chew. He reports that he does not drink alcohol or use drugs. Family history:  family history includes Cancer in his father, mother, and sister. No Known Allergies  Social History:    Reviewed; no changes    Objective:   PHYSICAL EXAM:        VITALS:  BP (!) 118/93   Pulse 78   Temp 98.9 °F (37.2 °C) (Oral)   Resp 18   Ht 5' 8\" (1.727 m)   Wt 201 lb 8 oz (91.4 kg)   SpO2 95%   BMI 30.64 kg/m²     24HR INTAKE/OUTPUT:    No intake or output data in the 24 hours ending 08/08/20 1149    CONSTITUTIONAL:  Awake. LUNGS:  decreased breath sounds, occ basilar crackles. CARDIOVASCULAR:  normal S1 and S2 and negative JVD  ABD:Abdomen soft, non-tender. BS normal. No masses,  No organomegaly  NEURO:Alert. DATA:    CBC:  Recent Labs     08/08/20  0410   WBC 6.8   RBC 4.09*   HGB 11.5*   HCT 40.8*      MCV 99.8   MCH 28.1   MCHC 28.2*   RDW 15.6*      BMP:  Recent Labs     08/06/20  0914 08/07/20  0955    141   K 4.8 4.0    101   CO2 20* 25   BUN 32* 34*   CREATININE 1.3 1.5*   CALCIUM 7.9* 8.3   GLUCOSE 99 112*      ABG:  No results for input(s): PH, PO2ART, QQE8LXC, HCO3, BEART, O2SAT in the last 72 hours. Lab Results   Component Value Date    PROBNP 1,687 (H) 08/05/2020    PROBNP 1,904 (H) 08/04/2020    PROBNP 542.4 (H) 08/02/2020     No results found for: [address] [facility]    Radiology Review:  Pertinent images / reports were reviewed as a part of this visit.     Assessment:     Patient Active Problem List   Diagnosis    Pneumonia due to organism    ARF (acute respiratory failure) ([facility] [address]    Essential hypertension, benign    Depressive disorder, not elsewhere classified    Acute renal failure (HCC)    Metabolic encephalopathy    SOB (shortness of breath)       Plan:   1. Overall the patient has improved. 2. [address] Activity.     Dariela Chinchilla MD  8/8/2020  11:49 AM

## 2020-08-08 NOTE — PROGRESS NOTES
[id number] [facility] SPEECH/LANGUAGE PATHOLOGY  DAILY PROGRESS NOTE  Ilana Benitez  8/8/2020  [id number]  Pneumonia due to organism [J18.9]  SOB (shortness of breath) [R06.02]  Septicemia ([address] [A41.9]  General weakness [R53.1]  No Known Allergies      Pt was seen this date for dysphagia treatment. IMPRESSION AND RECOMMENDATIONS:  Ilana Benitez was seen for a bedside swallowing treatment and diet tolerance monitoring. Pt was alert and cooperative throughout assessment. He was positioned upright in bed and accepted limited trials of regular solids, puree, nectar thick liquids, ice chips and thin liquids by spoon/cup sips. Pt demonstrated adequate mastication and oral clearance with trials of regular solids. Pharyngeal swallow appeared delayed with reduced laryngeal elevation. Pt presented with an immediate cough following trials of thin liquids by cup sip x1. Clear vocal quality and 0 overt s/s of aspiration were observed with trials of nectar thick liquids, puree and regular solids. Recommend continue regular diet/nectar thick liquids with strict aspiration precautions. ST will continue to follow. GOALS (current status in bold):  Short-term Goals  Timeframe for Short-term Goals: length of admission  Goal 1: Pt will tolerate regular diet/nectar thick liquids with adequate oral manipulation/clearance and no s/s aspiration. Meeting, continue  Goal 2: Pt will tolerate trials of advanced textures with adequate oral manipulation and no s/s aspiration for possible diet upgrade. Not met, overt cough with trials of thin liquids by cup sips  Goal 3: Pt will participate in MBSS as appropriate. Not indicated at this time  Goal 4: Pt/caregivers will indicate understanding of all recommendations.  Goal being met, continue       EDUCATION: as above    PAIN RATING (0-10 Scale): pt reported back pain- nursing made aware  Time in/Time out: SLP Individual Minutes  Time In: 1100  Time Out: 1125  Minutes: 25    Visit number: [id number] [facility] MS CCC-SLP  8/8/2020  11:27 AM

## 2020-08-08 NOTE — PROGRESS NOTES
Hospitalist Progress Note      Name:  Cely Flores /Age/Sex: 1932  (80 y.o. male)   MRN & CSN:  [id number] & [id number] Admission Date/Time: 2020 11:57 PM   Location:  [address] [place] [address] PCP: Winton Moritz, MD         Hospital Day: 8    Assessment and Plan:   Cely Flores is a 80 y.o.  male  who presents with SOB (shortness of breath)    1. Pneumonia:  · Chest x-ray with right upper lobe airspace opacity  · On oxygen by nasal cannula at 1 to 2 L/min  · Last febrile episode 101 Fahrenheit August 3, 2020, WBC trending down. · Completed Rocephin and azithromycin. · Respiratory disease panel negative, urine Legionella urine Streptococcus negative  · Repeat chest x-ray with trace right pleural effusion and pulmonary vascular congestion -given Lasix. Will be discharged on Lasix for 3 days. · Blood culture negative. · Order follow up CXR as patient reports cough today  · Swallow evaluation    2. Acute kidney injury on chronic kidney disease stage II  · Creatinine 1.5.,  Baseline 1.4. Avoid nephrotoxic agent. 3. Hypertension: Blood pressure controlled. Continue metoprolol. 4. Elevated liver function tests: Elevation started once antibiotics were started. Could be medication induced. Hepatitis panel negative. Ultrasound with cholelithiasis, normal liver echogenicity. -Liver function tests are still getting worse.   Gastroenterology on consult may need MRCP if worsening LFT    Avoid hepatotoxic  Medication      Diet DIET GENERAL; Mildly Thick (Nectar)  Dietary Nutrition Supplements: Frozen Oral Supplement   DVT Prophylaxis [x] Lovenox, []  Heparin, [] SCDs, [] Warfarin  [] NOAC     GI Prophylaxis [] PPI,  [] H2 Blocker,  [] Carafate,  [x] Diet/Tube Feeds   Code Status Full Code   MDM [] Low, [x] Moderate,[]  High     History of Present Illness:    the patient was seen and examined at bedside today  Patient reports Cough, reports choking with eating  Did order chest x-ray and swallow evaluation  GI recommended MRCP if liver function tests getting worse  Objective:     No intake or output data in the 24 hours ending 08/08/20 0932   Vitals:   Vitals:    08/08/20 0853   BP:    Pulse:    Resp: 22   Temp:    SpO2: 93%     Physical Exam:   GEN Awake male, sitting upright in bed in no apparent distress. Appears given age. EYES Pupils are equally round. No scleral erythema, discharge, or conjunctivitis. HENT Mucous membranes are moist. Oral pharynx without exudates, no evidence of thrush. NECK Supple, no apparent thyromegaly or masses. RESP occasional crackles. Resolving. CARDIO/VASC S1/S2 auscultated. Regular rate without appreciable murmurs, rubs, or gallops. No JVD or carotid bruits. Peripheral pulses equal bilaterally and palpable. No peripheral edema. GI Abdomen is soft without significant tenderness, masses, or guarding. Bowel sounds are normoactive. Rectal exam deferred. MSK No gross joint deformities. SKIN Normal coloration, warm, dry. NEURO Cranial nerves appear grossly intact, normal speech, no lateralizing weakness. PSYCH Awake, alert, oriented x 4. Affect appropriate. Medications:   Medications:    potassium & sodium phosphates  1 packet Oral 4x Daily    mirtazapine  45 mg Oral Nightly    QUEtiapine  100 mg Oral BID    metoprolol succinate  25 mg Oral Daily    albuterol sulfate HFA  2 puff Inhalation 4x daily    ipratropium  2 puff Inhalation 4x daily    sodium chloride flush  10 mL Intravenous 2 times per day    enoxaparin  30 mg Subcutaneous Daily      Infusions:   PRN Meds: oxyCODONE-acetaminophen, 1 tablet, Q4H PRN  cloNIDine, 0.1 mg, Q6H PRN  sodium chloride flush, 10 mL, PRN  polyethylene glycol, 17 g, Daily PRN  promethazine, 12.5 mg, Q6H PRN    Or  ondansetron, 4 mg, Q6H PRN        No results for input(s): WBC, HGB, HCT, PLT in the last 72 hours.    Recent Labs     08/06/20  0914 08/07/20  0955    141   K 4.8 4.0    101   CO2 20* 25   BUN 32* 34*

## 2020-08-09 ENCOUNTER — APPOINTMENT (OUTPATIENT)
Dept: GENERAL RADIOLOGY | Age: 85
DRG: 871 | End: 2020-08-09
Payer: MEDICARE

## 2020-08-09 ENCOUNTER — APPOINTMENT (OUTPATIENT)
Dept: MRI IMAGING | Age: 85
DRG: 871 | End: 2020-08-09
Payer: MEDICARE

## 2020-08-09 ENCOUNTER — APPOINTMENT (OUTPATIENT)
Dept: CT IMAGING | Age: 85
DRG: 871 | End: 2020-08-09
Payer: MEDICARE

## 2020-08-09 PROBLEM — R74.8 ELEVATED LIVER ENZYMES: Status: ACTIVE | Noted: 2020-08-09

## 2020-08-09 LAB
ALBUMIN SERPL-MCNC: 2.5 GM/DL (ref 3.4–5)
ALBUMIN SERPL-MCNC: 2.5 GM/DL (ref 3.4–5)
ALP BLD-CCNC: 263 IU/L (ref 40–129)
ALP BLD-CCNC: 263 IU/L (ref 40–129)
ALT SERPL-CCNC: 154 U/L (ref 10–40)
ALT SERPL-CCNC: 154 U/L (ref 10–40)
ANION GAP SERPL CALCULATED.3IONS-SCNC: 10 MMOL/L (ref 4–16)
AST SERPL-CCNC: 131 IU/L (ref 15–37)
AST SERPL-CCNC: 131 IU/L (ref 15–37)
BILIRUB SERPL-MCNC: 0.4 MG/DL (ref 0–1)
BILIRUB SERPL-MCNC: 0.4 MG/DL (ref 0–1)
BILIRUBIN DIRECT: 0.2 MG/DL (ref 0–0.3)
BILIRUBIN, INDIRECT: 0.2 MG/DL (ref 0–0.7)
BUN BLDV-MCNC: 38 MG/DL (ref 6–23)
CALCIUM SERPL-MCNC: 7.8 MG/DL (ref 8.3–10.6)
CHLORIDE BLD-SCNC: 103 MMOL/L (ref 99–110)
CO2: 25 MMOL/L (ref 21–32)
CREAT SERPL-MCNC: 1.5 MG/DL (ref 0.9–1.3)
GFR AFRICAN AMERICAN: 54 ML/MIN/1.73M2
GFR NON-AFRICAN AMERICAN: 44 ML/MIN/1.73M2
GLUCOSE BLD-MCNC: 124 MG/DL (ref 70–99)
HCT VFR BLD CALC: 34.2 % (ref 42–52)
HEMOGLOBIN: 10.8 GM/DL (ref 13.5–18)
MAGNESIUM: 2.3 MG/DL (ref 1.8–2.4)
MCH RBC QN AUTO: 28.2 PG (ref 27–31)
MCHC RBC AUTO-ENTMCNC: 31.6 % (ref 32–36)
MCV RBC AUTO: 89.3 FL (ref 78–100)
PDW BLD-RTO: 15 % (ref 11.7–14.9)
PHOSPHORUS: 4.8 MG/DL (ref 2.5–4.9)
PLATELET # BLD: 238 K/CU MM (ref 140–440)
PMV BLD AUTO: 10.3 FL (ref 7.5–11.1)
POTASSIUM SERPL-SCNC: 4.7 MMOL/L (ref 3.5–5.1)
RBC # BLD: 3.83 M/CU MM (ref 4.6–6.2)
SODIUM BLD-SCNC: 138 MMOL/L (ref 135–145)
TOTAL PROTEIN: 5 GM/DL (ref 6.4–8.2)
TOTAL PROTEIN: 5 GM/DL (ref 6.4–8.2)
WBC # BLD: 7.3 K/CU MM (ref 4–10.5)

## 2020-08-09 PROCEDURE — 83735 ASSAY OF MAGNESIUM: CPT

## 2020-08-09 PROCEDURE — 6370000000 HC RX 637 (ALT 250 FOR IP): Performed by: INTERNAL MEDICINE

## 2020-08-09 PROCEDURE — 85027 COMPLETE CBC AUTOMATED: CPT

## 2020-08-09 PROCEDURE — 82248 BILIRUBIN DIRECT: CPT

## 2020-08-09 PROCEDURE — 84100 ASSAY OF PHOSPHORUS: CPT

## 2020-08-09 PROCEDURE — 94640 AIRWAY INHALATION TREATMENT: CPT

## 2020-08-09 PROCEDURE — 36415 COLL VENOUS BLD VENIPUNCTURE: CPT

## 2020-08-09 PROCEDURE — 2700000000 HC OXYGEN THERAPY PER DAY

## 2020-08-09 PROCEDURE — 1200000000 HC SEMI PRIVATE

## 2020-08-09 PROCEDURE — 72125 CT NECK SPINE W/O DYE: CPT

## 2020-08-09 PROCEDURE — 2580000003 HC RX 258: Performed by: INTERNAL MEDICINE

## 2020-08-09 PROCEDURE — 6370000000 HC RX 637 (ALT 250 FOR IP): Performed by: HOSPITALIST

## 2020-08-09 PROCEDURE — 94761 N-INVAS EAR/PLS OXIMETRY MLT: CPT

## 2020-08-09 PROCEDURE — 80053 COMPREHEN METABOLIC PANEL: CPT

## 2020-08-09 RX ORDER — LIDOCAINE 4 G/G
1 PATCH TOPICAL ONCE
Status: COMPLETED | OUTPATIENT
Start: 2020-08-09 | End: 2020-08-10

## 2020-08-09 RX ADMIN — OXYCODONE HYDROCHLORIDE AND ACETAMINOPHEN 1 TABLET: 5; 325 TABLET ORAL at 22:48

## 2020-08-09 RX ADMIN — POTASSIUM & SODIUM PHOSPHATES POWDER PACK 280-160-250 MG 250 MG: 280-160-250 PACK at 22:48

## 2020-08-09 RX ADMIN — IPRATROPIUM BROMIDE 2 PUFF: 17 AEROSOL, METERED RESPIRATORY (INHALATION) at 11:32

## 2020-08-09 RX ADMIN — QUETIAPINE FUMARATE 100 MG: 100 TABLET ORAL at 22:48

## 2020-08-09 RX ADMIN — METOPROLOL SUCCINATE 25 MG: 25 TABLET, EXTENDED RELEASE ORAL at 13:33

## 2020-08-09 RX ADMIN — MIRTAZAPINE 45 MG: 15 TABLET, FILM COATED ORAL at 22:48

## 2020-08-09 RX ADMIN — SODIUM CHLORIDE, PRESERVATIVE FREE 10 ML: 5 INJECTION INTRAVENOUS at 22:49

## 2020-08-09 RX ADMIN — ALBUTEROL SULFATE 2 PUFF: 108 AEROSOL, METERED RESPIRATORY (INHALATION) at 11:32

## 2020-08-09 RX ADMIN — POTASSIUM & SODIUM PHOSPHATES POWDER PACK 280-160-250 MG 250 MG: 280-160-250 PACK at 18:03

## 2020-08-09 RX ADMIN — OXYCODONE HYDROCHLORIDE AND ACETAMINOPHEN 1 TABLET: 5; 325 TABLET ORAL at 13:30

## 2020-08-09 RX ADMIN — ALBUTEROL SULFATE 2 PUFF: 108 AEROSOL, METERED RESPIRATORY (INHALATION) at 07:31

## 2020-08-09 RX ADMIN — POTASSIUM & SODIUM PHOSPHATES POWDER PACK 280-160-250 MG 250 MG: 280-160-250 PACK at 13:00

## 2020-08-09 RX ADMIN — IPRATROPIUM BROMIDE 2 PUFF: 17 AEROSOL, METERED RESPIRATORY (INHALATION) at 07:30

## 2020-08-09 RX ADMIN — QUETIAPINE FUMARATE 100 MG: 100 TABLET ORAL at 13:32

## 2020-08-09 ASSESSMENT — PAIN SCALES - GENERAL
PAINLEVEL_OUTOF10: 5
PAINLEVEL_OUTOF10: 8
PAINLEVEL_OUTOF10: 7

## 2020-08-09 ASSESSMENT — PAIN DESCRIPTION - LOCATION: LOCATION: GENERALIZED

## 2020-08-09 ASSESSMENT — PAIN DESCRIPTION - DESCRIPTORS: DESCRIPTORS: OTHER (COMMENT)

## 2020-08-09 NOTE — PROGRESS NOTES
Pulmonary and Critical Care  Progress Note    Subjective: The patient has improved. CXR : Improving. Shortness of breath none. Chest pain none. Addressing respiratory complaints Patient is negative for  hemoptysis and cyanosis  CONSTITUTIONAL:  negative for fevers and chills      Past Medical History:     has a past medical history of Anxiety, Depression, Hypertension, and Nerves. has a past surgical history that includes joint replacement. reports that he quit smoking about 45 years ago. His smokeless tobacco use includes chew. He reports that he does not drink alcohol or use drugs. Family history:  family history includes Cancer in his father, mother, and sister. No Known Allergies  Social History:    Reviewed; no changes    Objective:   PHYSICAL EXAM:        VITALS:  /68   Pulse 62   Temp 98.8 °F (37.1 °C) (Oral)   Resp 20   Ht 5' 8\" (1.727 m)   Wt 201 lb (91.2 kg)   SpO2 96%   BMI 30.56 kg/m²     24HR INTAKE/OUTPUT:      Intake/Output Summary (Last 24 hours) at 8/9/2020 1240  Last data filed at 8/9/2020 8704  Gross per 24 hour   Intake --   Output 2050 ml   Net -2050 ml       CONSTITUTIONAL:  Awake. LUNGS:  decreased breath sounds, occ basilar crackles. CARDIOVASCULAR:  normal S1 and S2 and negative JVD  ABD:Abdomen soft, non-tender. BS normal. No masses,  No organomegaly  NEURO:Alert. DATA:    CBC:  Recent Labs     08/08/20  0410 08/09/20  0342   WBC 6.8 7.3   RBC 4.09* 3.83*   HGB 11.5* 10.8*   HCT 40.8* 34.2*    238   MCV 99.8 89.3   MCH 28.1 28.2   MCHC 28.2* 31.6*   RDW 15.6* 15.0*      BMP:  Recent Labs     08/07/20  0955 08/08/20  0410 08/09/20  0342    136 138   K 4.0 4.3 4.7    100 103   CO2 25 19* 25   BUN 34* 39* 38*   CREATININE 1.5* 1.4* 1.5*   CALCIUM 8.3 7.9* 7.8*   GLUCOSE 112* 107* 124*      ABG:  No results for input(s): PH, PO2ART, TKA5ILI, HCO3, BEART, O2SAT in the last 72 hours.   Lab Results   Component Value Date    PROBNP 1,687 (H) 08/05/2020 PROBNP 1,904 (H) 08/04/2020    PROBNP 542.4 (H) 08/02/2020     No results found for: CULTRESP    Radiology Review:  Pertinent images / reports were reviewed as a part of this visit. Assessment:     Patient Active Problem List   Diagnosis    Pneumonia due to organism    [facility]F (acute respiratory failure) ([facility] [address]    Essential hypertension, benign    Depressive disorder, not elsewhere classified    Acute renal failure ([facility] [address]    Metabolic encephalopathy    SOB (shortness of breath)    Elevated liver enzymes       Plan:   1. Overall the patient has improved. 2. [address] Activity.     Liliana Torres MD  8/9/2020  12:40 PM

## 2020-08-09 NOTE — PROGRESS NOTES
LFT's back up  Abdomen soft, non-tender, non-distended  Impression:    1) elevated LFT's- fluctuating- now doubt med induced--?passive congestion vs CBD stone          Plan:   1) MRCP tomorrow   2) continue to monitor LFT's

## 2020-08-09 NOTE — PROGRESS NOTES
Hospitalist Progress Note      Name:  Luz Maria Webb /Age/Sex: 1932  (80 y.o. male)   MRN & CSN:  [id number] & [id number] Admission Date/Time: 2020 11:57 PM   Location:  [address] [place] [address] PCP: Vivien Alamo MD         Hospital Day: 9    Assessment and Plan:   Luz Maria Webb is a 80 y.o.  male  who presents with SOB (shortness of breath)    1. Pneumonia:  · Chest x-ray with right upper lobe airspace opacity  · On oxygen by nasal cannula at 1 to 2 L/min  · Last febrile episode 101 Fahrenheit August 3, 2020, WBC trending down. · Completed Rocephin and azithromycin. · Respiratory disease panel negative, urine Legionella urine Streptococcus negative  · Repeat chest x-ray with trace right pleural effusion and pulmonary vascular congestion -given Lasix. Will be discharged on Lasix for 3 days. · Blood culture negative. · Order follow up CXR showed clearance of atelectasis  · Swallow evaluation    2. Acute kidney injury on chronic kidney disease stage II  · Creatinine 1.5.,  Baseline 1.4. Avoid nephrotoxic agent. 3. Hypertension: Blood pressure controlled. Continue metoprolol. 4. Elevated liver function tests: Elevation started once antibiotics were started. Could be medication induced. Hepatitis panel negative. Ultrasound with cholelithiasis, normal liver echogenicity. -Liver function tests are still getting worse.   Gastroenterology on consult may need MRCP if worsening LFT    Avoid hepatotoxic  Medication  LFT fluctuating, continue to monitor LFTs  GI recommended MRCP tomorrow      Diet DIET GENERAL; Mildly Thick (Nectar)  Dietary Nutrition Supplements: Frozen Oral Supplement   DVT Prophylaxis [x] Lovenox, []  Heparin, [] SCDs, [] Warfarin  [] NOAC     GI Prophylaxis [] PPI,  [] H2 Blocker,  [] Carafate,  [x] Diet/Tube Feeds   Code Status Full Code   MDM [] Low, [x] Moderate,[]  High     History of Present Illness:    the patient was seen and examined at bedside today  Patient reports neck [id number]      Recent Labs     08/07/20  0955 08/08/20  0410 08/09/20  0342    136 138   K 4.0 4.3 4.7    100 103   CO2 25 19* 25   PHOS  --   --  4.8   BUN 34* 39* 38*   CREATININE 1.5* 1.4* 1.5*     Recent Labs     08/07/20  0955 08/08/20 0410 08/09/20  0342   * 81* 131*  131*   * 135* 154*  154*   BILIDIR  --  0.2 0.2   BILITOT 0.5 0.5 0.4  0.4   ALKPHOS 274* 230* 263*  263*     Imaging reviewed    Electronically signed by Cody Loyd MD on 8/9/2020 at 9:01 AM

## 2020-08-10 ENCOUNTER — APPOINTMENT (OUTPATIENT)
Dept: MRI IMAGING | Age: 85
DRG: 871 | End: 2020-08-10
Payer: MEDICARE

## 2020-08-10 LAB
ALBUMIN SERPL-MCNC: 2.5 GM/DL (ref 3.4–5)
ALBUMIN SERPL-MCNC: 2.5 GM/DL (ref 3.4–5)
ALP BLD-CCNC: 236 IU/L (ref 40–128)
ALP BLD-CCNC: 236 IU/L (ref 40–129)
ALT SERPL-CCNC: 124 U/L (ref 10–40)
ALT SERPL-CCNC: 124 U/L (ref 10–40)
ANION GAP SERPL CALCULATED.3IONS-SCNC: 7 MMOL/L (ref 4–16)
AST SERPL-CCNC: 91 IU/L (ref 15–37)
AST SERPL-CCNC: 91 IU/L (ref 15–37)
BILIRUB SERPL-MCNC: 0.5 MG/DL (ref 0–1)
BILIRUB SERPL-MCNC: 0.5 MG/DL (ref 0–1)
BILIRUBIN DIRECT: 0.3 MG/DL (ref 0–0.3)
BILIRUBIN, INDIRECT: 0.2 MG/DL (ref 0–0.7)
BUN BLDV-MCNC: 35 MG/DL (ref 6–23)
CALCIUM SERPL-MCNC: 7.9 MG/DL (ref 8.3–10.6)
CHLORIDE BLD-SCNC: 101 MMOL/L (ref 99–110)
CO2: 29 MMOL/L (ref 21–32)
CREAT SERPL-MCNC: 1.5 MG/DL (ref 0.9–1.3)
GFR AFRICAN AMERICAN: 54 ML/MIN/1.73M2
GFR NON-AFRICAN AMERICAN: 44 ML/MIN/1.73M2
GLUCOSE BLD-MCNC: 105 MG/DL (ref 70–99)
HCT VFR BLD CALC: 34.6 % (ref 42–52)
HEMOGLOBIN: 10.5 GM/DL (ref 13.5–18)
MAGNESIUM: 2.3 MG/DL (ref 1.8–2.4)
MCH RBC QN AUTO: 27.5 PG (ref 27–31)
MCHC RBC AUTO-ENTMCNC: 30.3 % (ref 32–36)
MCV RBC AUTO: 90.6 FL (ref 78–100)
PDW BLD-RTO: 14.8 % (ref 11.7–14.9)
PHOSPHORUS: 4.3 MG/DL (ref 2.5–4.9)
PLATELET # BLD: 247 K/CU MM (ref 140–440)
PMV BLD AUTO: 10.4 FL (ref 7.5–11.1)
POTASSIUM SERPL-SCNC: 5.2 MMOL/L (ref 3.5–5.1)
RBC # BLD: 3.82 M/CU MM (ref 4.6–6.2)
SODIUM BLD-SCNC: 137 MMOL/L (ref 135–145)
TOTAL PROTEIN: 5.2 GM/DL (ref 6.4–8.2)
TOTAL PROTEIN: 5.2 GM/DL (ref 6.4–8.2)
WBC # BLD: 7.7 K/CU MM (ref 4–10.5)

## 2020-08-10 PROCEDURE — 83735 ASSAY OF MAGNESIUM: CPT

## 2020-08-10 PROCEDURE — 94761 N-INVAS EAR/PLS OXIMETRY MLT: CPT

## 2020-08-10 PROCEDURE — 97530 THERAPEUTIC ACTIVITIES: CPT

## 2020-08-10 PROCEDURE — 97535 SELF CARE MNGMENT TRAINING: CPT

## 2020-08-10 PROCEDURE — 36415 COLL VENOUS BLD VENIPUNCTURE: CPT

## 2020-08-10 PROCEDURE — 6370000000 HC RX 637 (ALT 250 FOR IP): Performed by: INTERNAL MEDICINE

## 2020-08-10 PROCEDURE — 1200000000 HC SEMI PRIVATE

## 2020-08-10 PROCEDURE — 6370000000 HC RX 637 (ALT 250 FOR IP): Performed by: HOSPITALIST

## 2020-08-10 PROCEDURE — 80053 COMPREHEN METABOLIC PANEL: CPT

## 2020-08-10 PROCEDURE — 74181 MRI ABDOMEN W/O CONTRAST: CPT

## 2020-08-10 PROCEDURE — 2700000000 HC OXYGEN THERAPY PER DAY

## 2020-08-10 PROCEDURE — 99211 OFF/OP EST MAY X REQ PHY/QHP: CPT

## 2020-08-10 PROCEDURE — 85027 COMPLETE CBC AUTOMATED: CPT

## 2020-08-10 PROCEDURE — 82248 BILIRUBIN DIRECT: CPT

## 2020-08-10 PROCEDURE — 84100 ASSAY OF PHOSPHORUS: CPT

## 2020-08-10 PROCEDURE — 2580000003 HC RX 258: Performed by: INTERNAL MEDICINE

## 2020-08-10 RX ORDER — DIPHENHYDRAMINE HCL 25 MG
12.5 TABLET ORAL ONCE
Status: COMPLETED | OUTPATIENT
Start: 2020-08-10 | End: 2020-08-10

## 2020-08-10 RX ADMIN — METOPROLOL SUCCINATE 25 MG: 25 TABLET, EXTENDED RELEASE ORAL at 11:37

## 2020-08-10 RX ADMIN — QUETIAPINE FUMARATE 100 MG: 100 TABLET ORAL at 11:38

## 2020-08-10 RX ADMIN — OXYCODONE HYDROCHLORIDE AND ACETAMINOPHEN 1 TABLET: 5; 325 TABLET ORAL at 11:37

## 2020-08-10 RX ADMIN — COLLAGENASE SANTYL: 250 OINTMENT TOPICAL at 17:55

## 2020-08-10 RX ADMIN — MIRTAZAPINE 45 MG: 15 TABLET, FILM COATED ORAL at 22:23

## 2020-08-10 RX ADMIN — DIPHENHYDRAMINE HYDROCHLORIDE 12.5 MG: 25 TABLET ORAL at 11:37

## 2020-08-10 RX ADMIN — SODIUM CHLORIDE, PRESERVATIVE FREE 10 ML: 5 INJECTION INTRAVENOUS at 22:23

## 2020-08-10 RX ADMIN — QUETIAPINE FUMARATE 100 MG: 100 TABLET ORAL at 22:23

## 2020-08-10 ASSESSMENT — PAIN SCALES - GENERAL
PAINLEVEL_OUTOF10: 0
PAINLEVEL_OUTOF10: 5
PAINLEVEL_OUTOF10: 4
PAINLEVEL_OUTOF10: 5

## 2020-08-10 NOTE — PROGRESS NOTES
Pulmonary and Critical Care  Progress Note    Subjective: The patient is better. Shortness of breath better. Chest pain none. Addressing respiratory complaints Patient is negative for  hemoptysis and cyanosis  CONSTITUTIONAL:  negative for fevers and chills      Past Medical History:     has a past medical history of Anxiety, Depression, Hypertension, and Nerves. has a past surgical history that includes joint replacement. reports that he quit smoking about 45 years ago. His smokeless tobacco use includes chew. He reports that he does not drink alcohol or use drugs. Family history:  family history includes Cancer in his father, mother, and sister. No Known Allergies  Social History:    Reviewed; no changes    Objective:   PHYSICAL EXAM:        VITALS:  BP (!) 111/50   Pulse 62   Temp 98.5 °F (36.9 °C) (Oral)   Resp 20   Ht 5' 8\" (1.727 m)   Wt 201 lb (91.2 kg)   SpO2 92%   BMI 30.56 kg/m²     24HR INTAKE/OUTPUT:      Intake/Output Summary (Last 24 hours) at 8/10/2020 1113  Last data filed at 8/9/2020 1841  Gross per 24 hour   Intake --   Output 800 ml   Net -800 ml       CONSTITUTIONAL:  Awake. LUNGS:  decreased breath sounds, occ basilar crackles. CARDIOVASCULAR:  normal S1 and S2 and negative JVD  ABD:Abdomen soft, non-tender. BS normal. No masses,  No organomegaly  NEURO:Alert. DATA:    CBC:  Recent Labs     08/08/20  0410 08/09/20  0342 08/10/20  0406   WBC 6.8 7.3 7.7   RBC 4.09* 3.83* 3.82*   HGB 11.5* 10.8* 10.5*   HCT 40.8* 34.2* 34.6*    238 247   MCV 99.8 89.3 90.6   MCH 28.1 28.2 27.5   MCHC 28.2* 31.6* 30.3*   RDW 15.6* 15.0* 14.8      BMP:  Recent Labs     08/08/20  0410 08/09/20  0342 08/10/20  0406    138 137   K 4.3 4.7 5.2*    103 101   CO2 19* 25 29   BUN 39* 38* 35*   CREATININE 1.4* 1.5* 1.5*   CALCIUM 7.9* 7.8* 7.9*   GLUCOSE 107* 124* 105*      ABG:  No results for input(s): PH, PO2ART, JZS9SEE, HCO3, BEART, O2SAT in the last 72 hours.   Lab Results

## 2020-08-10 NOTE — PROGRESS NOTES
Occupational Therapy      Occupational Therapy Treatment Note    Name: Cathy Guthrie MRN: [id number] :   1932   Date:  8/10/2020   Admission Date: 2020 Room:  [facility]     Primary Problem: Pneumonia, Sepsis    Restrictions/Precautions: General Precautions, Fall Risk, Telemetry, Pulse Ox, BP cuff, IV, [place], 2L o2, Bed exit alarm    Communication with other providers: PT Luciano Friend, JAYNA Rubio    Subjective:  Patient states: \"I've got this pan underneath my butt. \" (pt found in supine on bed pan)  Pain: Pt reported 9/10 pain at sacral wound and at blister site on Lt side of back    Objective:    Observation: Pt received in supine on bed pan upon arrival.  Objective Measures: Stable HR and o2 sats throughout session    Treatment, including education:  Therapeutic Activity Training:   Therapeutic activity training was instructed today. Cues were given for safety, sequence, UE/LE placement, awareness, and balance. Self Care Training:   Cues were given for safety, sequence, UE/LE placement, visual cues, and balance. Pt received in supine upon OT arrival. Agreeable to treatment and requesting to get off of bed pan. Pt completed log roll to Rt side max A with max coaching for bending Lt knee/reaching for bed rail. Pt with large, loose bowel movement in bed pan with significant soiling of bed pad and wound dressings. Pt dependent with LB bathing and linda care in sidelying with continual mod A required for pt to maintain sidelying position. Pt rolled to Lt side max A/max coaching for thorough linda care and removal of soiled pad. Wound dressings also had to be removed, as they were completely soiled from loose stool. RN notified who arrived and performed wound care while pt in sidelying with continual mod A required to maintain sidelying position. Pt rolled each direction 2 additional times max A/max coaching for re-positioning and skin inspection.  Pillows positioned under Lt side, and pt dependent x 2 for scooting up in bed. Pt left positioned for comfort with all lines intact, all needs within reach, and bed alarm on. Assessment / Impression:    Patient's tolerance of treatment: Fair-/Poor  Adverse Reaction: Limited pain tolerance, Sacral wound  Significant change in status and impact: Decreased overall performance/tolerance from initial evaluation  Barriers to improvement: Overall weakness/debility, Wounds    Plan for Next Session:    Continue per OT POC. Per RN, pt undergoing potential debridement today or tomorrow. Hopeful for EOB/OOB activity again during next session. Continue to recommend SNF at d/c.       Time in: 1509  Time out: 1533  Timed treatment minutes: 24  Total treatment time: 24      Electronically signed by:    WHITNEY Hernández/L, DAVID, [id number]

## 2020-08-10 NOTE — CARE COORDINATION
LSW left message with Shireen Salgado with [facility] asking if pt precert is still good. LSW placed a WB note asking for updated notes from therapy. l

## 2020-08-10 NOTE — CONSULTS
[facility] Continence Nurse  Consult Note       Deloris Chávez  AGE: 80 y.o. GENDER: male  : 1932  TODAY'S DATE:  8/10/2020    Subjective:     Reason for  Evaluation and Assessment: wound assessment      Deloris Chávez is a 80 y.o. male referred by:   [x] Physician  [] Nursing  [] Other:     Wound Identification:  Wound Type: pressure  Contributing Factors: chronic pressure and decreased mobility        PAST MEDICAL HISTORY        Diagnosis Date    Anxiety     Depression     Hypertension     Nerves        PAST SURGICAL HISTORY    Past Surgical History:   Procedure Laterality Date    JOINT REPLACEMENT      right hip       FAMILY HISTORY    Family History   Problem Relation Age of Onset    Cancer Mother     Cancer Father     Cancer Sister        SOCIAL HISTORY    Social History     Tobacco Use    Smoking status: Former Smoker     Last attempt to quit: 1974     Years since quittin.7    Smokeless tobacco: Current User     Types: Chew   Substance Use Topics    Alcohol use: No     Comment: used to drimk on weekends stopped 2 years ago    Drug use: No       ALLERGIES    No Known Allergies    MEDICATIONS    No current facility-administered medications on file prior to encounter. Current Outpatient Medications on File Prior to Encounter   Medication Sig Dispense Refill    LORazepam (ATIVAN) 1 MG tablet Take 1 mg by mouth 2 times daily. At breakfast and lunch      LORazepam (ATIVAN) 1 MG tablet Take 2 mg by mouth nightly.  cloNIDine (CATAPRES) 0.1 MG tablet Take 1 tablet by mouth every 6 hours as needed (for B/P systolic > 484). 60 tablet 3    pantoprazole (PROTONIX) 40 MG tablet Take 1 tablet by mouth every morning (before breakfast). 30 tablet 3    QUEtiapine (SEROQUEL) 100 MG tablet Take 100 mg by mouth 2 times daily.  metoprolol (TOPROL-XL) 25 MG XL tablet Take 25 mg by mouth daily.  mirtazapine (REMERON) 45 MG tablet Take 45 mg by mouth nightly. Objective:      /68   Pulse 61   Temp 98.7 °F (37.1 °C) (Oral)   Resp 18   Ht 5' 8\" (1.727 m)   Wt 201 lb (91.2 kg)   SpO2 94%   BMI 30.56 kg/m²   Boone Risk Score: Boone Scale Score: 14    LABS    CBC:   Lab Results   Component Value Date    WBC 7.7 08/10/2020    RBC 3.82 08/10/2020    HGB 10.5 08/10/2020    HCT 34.6 08/10/2020    MCV 90.6 08/10/2020    MCH 27.5 08/10/2020    MCHC 30.3 08/10/2020    RDW 14.8 08/10/2020     08/10/2020    MPV 10.4 08/10/2020     CMP:    Lab Results   Component Value Date     08/10/2020    K 5.2 08/10/2020     08/10/2020    CO2 29 08/10/2020    BUN 35 08/10/2020    CREATININE 1.5 08/10/2020    GFRAA 54 08/10/2020    LABGLOM 44 08/10/2020    GLUCOSE 105 08/10/2020    PROT 5.2 08/10/2020    PROT 5.2 08/10/2020    PROT 6.4 10/24/2012    LABALBU 2.5 08/10/2020    LABALBU 2.5 08/10/2020    CALCIUM 7.9 08/10/2020    BILITOT 0.5 08/10/2020    BILITOT 0.5 08/10/2020    ALKPHOS 236 08/10/2020    ALKPHOS 236 08/10/2020    AST 91 08/10/2020    AST 91 08/10/2020     08/10/2020     08/10/2020     Albumin:    Lab Results   Component Value Date    LABALBU 2.5 08/10/2020    LABALBU 2.5 08/10/2020     PT/INR:    Lab Results   Component Value Date    PROTIME 13.9 08/04/2020    INR 1.15 08/04/2020     HgBA1c:    Lab Results   Component Value Date    LABA1C 5.3 06/05/2013         Assessment:     Patient Active Problem List   Diagnosis    Pneumonia due to organism    [facility]    Essential hypertension, benign    Depressive disorder, not elsewhere classified    Acute renal failure ([facility]    Metabolic encephalopathy    SOB (shortness of breath)    Elevated liver enzymes       Measurements:  Wound 08/10/20 Sacrum and buttock (Active)   Wound Pressure Unstageable 08/10/20 1130   Dressing Status Changed 08/10/20 1130   Dressing Changed Changed/New 08/10/20 1130   Wound Cleansed Rinsed/Irrigated with saline 08/10/20 1130 tbd  Hospice Care: no  Patient appropriate for Outpatient [address]: yes    Patient/Caregiver Teaching:  Level of patient/caregiver understanding able to:   Needs reinforcement.         Electronically signed by Bill Saravia RN,  on 8/10/2020 at 1:17 PM

## 2020-08-10 NOTE — PROGRESS NOTES
Hospitalist Progress Note      Name:  Nancie Xiao /Age/Sex: 1932  (80 y.o. male)   MRN & CSN:  [id number] & [id number] Admission Date/Time: 2020 11:57 PM   Location:  [facility]- PCP: Elena Crisostomo MD         Hospital Day: 10    Assessment and Plan:   Nancie Xiao is a 80 y.o.  male  who presents with SOB (shortness of breath)    1. Pneumonia:  · Chest x-ray with right upper lobe airspace opacity  · On oxygen by nasal cannula at 1 to 2 L/min  · Last febrile episode 101 Fahrenheit August 3, 2020, WBC trending down. · Completed Rocephin and azithromycin. · Respiratory disease panel negative, urine Legionella urine Streptococcus negative  · Repeat chest x-ray with trace right pleural effusion and pulmonary vascular congestion -given Lasix. Will be discharged on Lasix for 3 days. · Blood culture negative. · Order follow up CXR showed clearance of atelectasis  · Swallow evaluation    2-acute neck stiffness with pain: Could be torticollis? Due to malposition or represent extrapyramidal symptom  Start of Benadryl  On lidocaine patch, continue on pain medication  CT cervical nonacute  Consult ENT for possible Botox injection    2. Acute kidney injury on chronic kidney disease stage II  · Creatinine 1.5.,  Baseline 1.4. Avoid nephrotoxic agent. ·   Mild hyperkalemia : 5.2 repeat serum potassium and  hold potassium phosphate supplement    3. Hypertension: Blood pressure controlled. Continue metoprolol. 4. Elevated liver function tests: Elevation started once antibiotics were started. Could be medication induced. Hepatitis panel negative. Ultrasound with cholelithiasis, normal liver echogenicity. -Liver function tests are still getting worse.   Gastroenterology on consult may need MRCP if worsening LFT    Avoid hepatotoxic  Medication  LFT fluctuating, continue to monitor LFTs  GI recommended MRCP today      Diet DIET GENERAL; Mildly Thick (Nectar)  Dietary Nutrition Supplements: Frozen Oral Supplement   DVT Prophylaxis [x] Lovenox, []  Heparin, [] SCDs, [] Warfarin  [] NOAC     GI Prophylaxis [] PPI,  [] H2 Blocker,  [] Carafate,  [x] Diet/Tube Feeds   Code Status Full Code   MDM [] Low, [x] Moderate,[]  High     History of Present Illness:    the patient was seen and examined at bedside today  Patient continues unable to turn his lung to right side because of the neck stiffness and pain  CT cervical spine nonacute  Could represent neck torticollis, currently on lidocaine patch and Benadryl  I did consult ENT for possible Botox injection? Discussed with the nurse  Objective: Intake/Output Summary (Last 24 hours) at 8/10/2020 0935  Last data filed at 8/9/2020 1841  Gross per 24 hour   Intake --   Output 800 ml   Net -800 ml      Vitals:   Vitals:    08/10/20 0750   BP:    Pulse:    Resp:    Temp:    SpO2: 92%     Physical Exam:   GEN Awake male,  in no apparent distress. Appears given age. EYES Pupils are equally round. No scleral erythema, discharge, or conjunctivitis. HENT Mucous membranes are moist.,  Neck stiffness on the left side, reduced range of movement of neck because of the tenderness and neck stiffness  NECK Supple, no apparent thyromegaly or masses. RESP occasional crackles. Resolving. CARDIO/VASC S1/S2 auscultated. Regular rate without appreciable murmurs, rubs, or gallops. No JVD or carotid bruits. Peripheral pulses equal bilaterally and palpable. No peripheral edema. GI Abdomen is soft without significant tenderness, masses, or guarding. Bowel sounds are normoactive. Rectal exam deferred. MSK No gross joint deformities. SKIN Normal coloration, warm, dry. NEURO Cranial nerves appear grossly intact, normal speech, no lateralizing weakness. PSYCH Awake, confused .       Medications:   Medications:    diphenhydrAMINE  12.5 mg Oral Once    potassium & sodium phosphates  1 packet Oral 4x Daily    mirtazapine  45 mg Oral Nightly    QUEtiapine  100 mg Oral BID    metoprolol succinate  25 mg Oral Daily    albuterol sulfate HFA  2 puff Inhalation 4x daily    ipratropium  2 puff Inhalation 4x daily    sodium chloride flush  10 mL Intravenous 2 times per day    enoxaparin  30 mg Subcutaneous Daily      Infusions:   PRN Meds: oxyCODONE-acetaminophen, 1 tablet, Q4H PRN  cloNIDine, 0.1 mg, Q6H PRN  sodium chloride flush, 10 mL, PRN  polyethylene glycol, 17 g, Daily PRN  promethazine, 12.5 mg, Q6H PRN    Or  ondansetron, 4 mg, Q6H PRN        Recent Labs     08/08/20  0410 08/09/20  0342 08/10/20  0406   WBC 6.8 7.3 7.7   HGB 11.5* 10.8* 10.5*   HCT 40.8* 34.2* 34.6*    238 247      Recent Labs     08/08/20  0410 08/09/20  0342 08/10/20  0406    138 137   K 4.3 4.7 5.2*    103 101   CO2 19* 25 29   PHOS  --  4.8 4.3   BUN 39* 38* 35*   CREATININE 1.4* 1.5* 1.5*     Recent Labs     08/08/20  0410 08/09/20  0342 08/10/20  0406   AST 81* 131*  131* 91*  91*   * 154*  154* 124*  124*   BILIDIR 0.2 0.2 0.3   BILITOT 0.5 0.4  0.4 0.5  0.5   ALKPHOS 230* 263*  263* 236*  236*     Imaging reviewed    Electronically signed by Mario Ho MD on 8/10/2020 at 9:35 AM

## 2020-08-10 NOTE — PROGRESS NOTES
Benadryl significantly improved pts torticollis. He is able to move is neck now with freely with little discomfort.

## 2020-08-10 NOTE — PROGRESS NOTES
Physical Therapy    Physical Therapy Treatment Note  Name: Ignacia Mcpherson MRN: [id number] :   1932   Date:  8/10/2020   Admission Date: 2020 Room:  [address] [place] [address]-A   Restrictions/Precautions:        Fall risk  Communication with other providers:  Co-tx with OT  Subjective:  Patient states:  \"I'm on the pan\"  Pain:   Location, Type, Intensity (0/10 to 10/10):  Reports pain in buttocks/sarcum, large wound present, 5/10. Objective:    Observation:  Supine in bed, on bedpan upon arrival.  Treatment, including education/measures:  Rolling R/L: x3 R and x3 L rolls, cues and assist for positioning and set up for rolling, max A to roll, performed for pericare and linen change. Cues for use of bedrail, knee flexion, and neck rotation. Deferred sup to sit and tx to chair as dressing on sacrum needed replaced during session and surgeon planning to assess this pm for potential debridement. Patient fatigued after session, positioned for optimal alignment in bed. Safety: left in bed with bed alarm, call light within reach, RN notified. Assessment / Impression:       Patient's tolerance of treatment:  Tolerated well   Adverse Reaction: Pain  Significant change in status and impact:  Improved mobility  Barriers to improvement:  Strength, coordination, rigidity. Plan for Next Session:    Cont POC.    Time in:  1509  Time out:  1533  Timed treatment minutes:  24  Total treatment time:  24    Previously filed items:  Social/Functional History  Lives With: Son  Type of Home: House  Home Layout: Two level  Home Access: Stairs to enter with rails  Entrance Stairs - Number of Steps: Pt uncertain  Entrance Stairs - Rails: Both  Home Equipment: Cane, Rolling walker  ADL Assistance: Independent  Homemaking Assistance: Independent  Ambulation Assistance: Independent(mod I with cane or RW)  Transfer Assistance: Independent  Active : No(Son drives)  Occupation: Retired  Short term goals  Time Frame for Short term goals: 1 week  Short term goal 1: Patient will complete bed mobility Min A  Short term goal 2: Patient will perform STS x5 with LRAD and Mod A. Short term goal 3: Patient will ambulate x6ft with LRAD and Mod A.        Electronically signed by:    Caitlin Yost, PT  8/10/2020, 3:41 PM

## 2020-08-11 LAB
ALBUMIN SERPL-MCNC: 2.7 GM/DL (ref 3.4–5)
ALP BLD-CCNC: 271 IU/L (ref 40–129)
ALT SERPL-CCNC: 113 U/L (ref 10–40)
ANION GAP SERPL CALCULATED.3IONS-SCNC: 9 MMOL/L (ref 4–16)
AST SERPL-CCNC: 82 IU/L (ref 15–37)
BILIRUB SERPL-MCNC: 0.3 MG/DL (ref 0–1)
BUN BLDV-MCNC: 34 MG/DL (ref 6–23)
CALCIUM SERPL-MCNC: 8.3 MG/DL (ref 8.3–10.6)
CHLORIDE BLD-SCNC: 98 MMOL/L (ref 99–110)
CO2: 26 MMOL/L (ref 21–32)
CREAT SERPL-MCNC: 1.4 MG/DL (ref 0.9–1.3)
GFR AFRICAN AMERICAN: 58 ML/MIN/1.73M2
GFR NON-AFRICAN AMERICAN: 48 ML/MIN/1.73M2
GLUCOSE BLD-MCNC: 135 MG/DL (ref 70–99)
HCT VFR BLD CALC: 38.2 % (ref 42–52)
HEMOGLOBIN: 11.6 GM/DL (ref 13.5–18)
MAGNESIUM: 2.2 MG/DL (ref 1.8–2.4)
MCH RBC QN AUTO: 27.8 PG (ref 27–31)
MCHC RBC AUTO-ENTMCNC: 30.4 % (ref 32–36)
MCV RBC AUTO: 91.4 FL (ref 78–100)
PDW BLD-RTO: 14.6 % (ref 11.7–14.9)
PHOSPHORUS: 3.5 MG/DL (ref 2.5–4.9)
PLATELET # BLD: 278 K/CU MM (ref 140–440)
PMV BLD AUTO: 9.8 FL (ref 7.5–11.1)
POTASSIUM SERPL-SCNC: 5.2 MMOL/L (ref 3.5–5.1)
RBC # BLD: 4.18 M/CU MM (ref 4.6–6.2)
SODIUM BLD-SCNC: 133 MMOL/L (ref 135–145)
TOTAL PROTEIN: 5.5 GM/DL (ref 6.4–8.2)
WBC # BLD: 10.5 K/CU MM (ref 4–10.5)

## 2020-08-11 PROCEDURE — 1200000000 HC SEMI PRIVATE

## 2020-08-11 PROCEDURE — 80053 COMPREHEN METABOLIC PANEL: CPT

## 2020-08-11 PROCEDURE — 94640 AIRWAY INHALATION TREATMENT: CPT

## 2020-08-11 PROCEDURE — 99222 1ST HOSP IP/OBS MODERATE 55: CPT | Performed by: SURGERY

## 2020-08-11 PROCEDURE — 94761 N-INVAS EAR/PLS OXIMETRY MLT: CPT

## 2020-08-11 PROCEDURE — 83735 ASSAY OF MAGNESIUM: CPT

## 2020-08-11 PROCEDURE — 84100 ASSAY OF PHOSPHORUS: CPT

## 2020-08-11 PROCEDURE — 36415 COLL VENOUS BLD VENIPUNCTURE: CPT

## 2020-08-11 PROCEDURE — 85027 COMPLETE CBC AUTOMATED: CPT

## 2020-08-11 PROCEDURE — 2580000003 HC RX 258: Performed by: INTERNAL MEDICINE

## 2020-08-11 PROCEDURE — 6360000002 HC RX W HCPCS: Performed by: INTERNAL MEDICINE

## 2020-08-11 PROCEDURE — 6370000000 HC RX 637 (ALT 250 FOR IP): Performed by: INTERNAL MEDICINE

## 2020-08-11 RX ADMIN — IPRATROPIUM BROMIDE 2 PUFF: 17 AEROSOL, METERED RESPIRATORY (INHALATION) at 16:23

## 2020-08-11 RX ADMIN — ALBUTEROL SULFATE 2 PUFF: 108 AEROSOL, METERED RESPIRATORY (INHALATION) at 07:55

## 2020-08-11 RX ADMIN — OXYCODONE HYDROCHLORIDE AND ACETAMINOPHEN 1 TABLET: 5; 325 TABLET ORAL at 09:39

## 2020-08-11 RX ADMIN — COLLAGENASE SANTYL: 250 OINTMENT TOPICAL at 09:38

## 2020-08-11 RX ADMIN — IPRATROPIUM BROMIDE 2 PUFF: 17 AEROSOL, METERED RESPIRATORY (INHALATION) at 07:56

## 2020-08-11 RX ADMIN — SODIUM CHLORIDE, PRESERVATIVE FREE 10 ML: 5 INJECTION INTRAVENOUS at 09:39

## 2020-08-11 RX ADMIN — ALBUTEROL SULFATE 2 PUFF: 108 AEROSOL, METERED RESPIRATORY (INHALATION) at 20:10

## 2020-08-11 RX ADMIN — MIRTAZAPINE 45 MG: 15 TABLET, FILM COATED ORAL at 21:08

## 2020-08-11 RX ADMIN — OXYCODONE HYDROCHLORIDE AND ACETAMINOPHEN 1 TABLET: 5; 325 TABLET ORAL at 21:09

## 2020-08-11 RX ADMIN — ENOXAPARIN SODIUM 30 MG: 30 INJECTION SUBCUTANEOUS at 09:39

## 2020-08-11 RX ADMIN — METOPROLOL SUCCINATE 25 MG: 25 TABLET, EXTENDED RELEASE ORAL at 09:39

## 2020-08-11 RX ADMIN — QUETIAPINE FUMARATE 100 MG: 100 TABLET ORAL at 21:08

## 2020-08-11 RX ADMIN — IPRATROPIUM BROMIDE 2 PUFF: 17 AEROSOL, METERED RESPIRATORY (INHALATION) at 20:10

## 2020-08-11 RX ADMIN — ALBUTEROL SULFATE 2 PUFF: 108 AEROSOL, METERED RESPIRATORY (INHALATION) at 16:24

## 2020-08-11 RX ADMIN — QUETIAPINE FUMARATE 100 MG: 100 TABLET ORAL at 09:39

## 2020-08-11 RX ADMIN — SODIUM CHLORIDE, PRESERVATIVE FREE 10 ML: 5 INJECTION INTRAVENOUS at 21:09

## 2020-08-11 ASSESSMENT — PAIN DESCRIPTION - LOCATION: LOCATION: GENERALIZED

## 2020-08-11 ASSESSMENT — PAIN SCALES - GENERAL
PAINLEVEL_OUTOF10: 0
PAINLEVEL_OUTOF10: 9
PAINLEVEL_OUTOF10: 7

## 2020-08-11 ASSESSMENT — PAIN DESCRIPTION - PAIN TYPE: TYPE: ACUTE PAIN

## 2020-08-11 NOTE — PROGRESS NOTES
[address]    Hospitalist Progress Note      Name:  Cris Coleman /Age/Sex: 1932  (80 y.o. male)   MRN & CSN:  [id number] & [id number] Admission Date/Time: 2020 11:57 PM   Location:  [address] [place] [address] PCP: Jasper Goldberg, MD         Hospital Day: 11    Assessment and Plan:   Cris Coelman is a 80 y.o.  male  who presents with SOB (shortness of breath)    Pneumonia- community-acquired:    Chest x-ray with right upper lobe airspace opacity, current status unknown  On oxygen by nasal cannula at 1 to 2 L/min  Last febrile episode 101 Fahrenheit August 3, 2020, WBC trending down. Completed Rocephin and azithromycin. Respiratory disease panel negative, urine Legionella urine Streptococcus negative  Repeat chest x-ray with trace right pleural effusion and pulmonary vascular congestion -given Lasix. Blood culture negative. Order follow up CXR showed clearance of atelectasis     Acute neck stiffness with pain:     Could be torticollis? Due to malposition or represent extrapyramidal symptom  On lidocaine patch, continue on pain medication  CT cervical nonacute  Consult ENT for possible Botox injection    Elevated liver function tests:     Elevation started once antibiotics were started    Could be medication induced. Hepatitis panel negative. Ultrasound with cholelithiasis, normal liver echogenicity -   Avoid hepatotoxic Medication  LFT fluctuating, continue to monitor LFTs  GI recommended  EGD/Colonoscopy  -  patient is stable for the mentioned procedures    Acute kidney injury on chronic kidney disease stage II    Creatinine 1.5.,  Baseline 1.4. Avoid nephrotoxic agent.      Mild hyperkalemia : 5.2 repeat serum potassium and  hold potassium phosphate supplement     Chronic issues     Hypertension   Depression  COPD with no exacerbation    Resolved issues     Metabolic encephalopathy     Diet Dietary Nutrition Supplements: Frozen Oral Supplement  DIET GENERAL; Mildly Thick (Nectar)   DVT Prophylaxis [] Lovenox, []  Heparin, [] SCDs, []No VTE prophylaxis, patient ambulating   GI Prophylaxis [] PPI, [] H2 Blocker, [] No GI prophylaxis, patient is receiving diet/Tube Feeds   Code Status Full Code   Disposition Patient requires continued admission due to pneumonia   MDM [] Low, [x] Moderate,[]  High     History of Present Illness: Subjective     Patient Seen & Examined at the bedside      Patient is resting in bed with no distress -denies any shortness of breath or chest pain  Denies any abdominal pain  Denies any neck pain today    Ten point ROS reviewed negative, unless as noted above    Objective: Intake/Output Summary (Last 24 hours) at 8/11/2020 1108  Last data filed at 8/11/2020 0800  Gross per 24 hour   Intake 640 ml   Output 2300 ml   Net -1660 ml      Vitals:   Vitals:    08/11/20 0941   BP: (!) 144/57   Pulse: 77   Resp:    Temp: 98.6 °F (37 °C)   SpO2:      Physical Exam:    GEN Awake male, resting in bed in no apparent distress. Appears given age. HENT Mucous membranes are moist.   RESP Clear to auscultation, no wheezes, rales or rhonchi. CARDIO/VASC -S1/S2 auscultated. Regular rate without appreciable murmurs, rubs, or gallops. Peripheral pulses equal bilaterally and palpable. No peripheral edema. GI Abdomen is soft without significant tenderness, masses, or guarding. Bowel sounds are normoactive. Rectal exam deferred.  Greene catheter is not present.     Medications:   Medications:    collagenase   Topical Daily    mirtazapine  45 mg Oral Nightly    QUEtiapine  100 mg Oral BID    metoprolol succinate  25 mg Oral Daily    albuterol sulfate HFA  2 puff Inhalation 4x daily    ipratropium  2 puff Inhalation 4x daily    sodium chloride flush  10 mL Intravenous 2 times per day    enoxaparin  30 mg Subcutaneous Daily      Infusions:   PRN Meds: oxyCODONE-acetaminophen, 1 tablet, Q4H PRN  cloNIDine, 0.1 mg, Q6H PRN  sodium chloride flush, 10 mL, PRN  polyethylene glycol, 17 g, Daily PRN  promethazine, 12.5 mg, Q6H PRN    Or  ondansetron, 4 mg, Q6H PRN          Electronically signed by Denice Godinez MD on 8/11/2020 at 11:08 AM

## 2020-08-11 NOTE — CONSULTS
80 y.o. man, consulted for stiffness of the neck with pain on the right side present since shortly after current admission for pneumonia. No known h/o trauma. Had a CT of the neck, which shows severe degenerative disk disease and no soft tissue abnormality of the neck.  No prior h/o neck symptoms    Past Medical History:   Diagnosis Date    Anxiety     Depression     Hypertension     Nerves      Current Outpatient Medications   Medication Instructions    cloNIDine (CATAPRES) 0.1 mg, Oral, EVERY 6 HOURS PRN    furosemide (LASIX) 40 mg, Oral, DAILY    LORazepam (ATIVAN) 1 mg, Oral, 2 TIMES DAILY, At breakfast and lunch     LORazepam (ATIVAN) 2 mg, Oral, NIGHTLY    metoprolol succinate (TOPROL XL) 25 mg, Oral, DAILY    mirtazapine (REMERON) 45 mg, NIGHTLY    pantoprazole (PROTONIX) 40 mg, Oral, DAILY BEFORE BREAKFAST    QUEtiapine (SEROQUEL) 100 mg, Oral, 2 TIMES DAILY     No Known Allergies    Social History     Socioeconomic History    Marital status:      Spouse name: Not on file    Number of children: 3    Years of education: Not on file    Highest education level: Not on file   Occupational History    Not on file   Social Needs    Financial resource strain: Not on file    Food insecurity     Worry: Not on file     Inability: Not on file    Transportation needs     Medical: Not on file     Non-medical: Not on file   Tobacco Use    Smoking status: Former Smoker     Last attempt to quit: 1974     Years since quittin.7    Smokeless tobacco: Current User     Types: Chew   Substance and Sexual Activity    Alcohol use: No     Comment: used to drimk on weekends stopped 2 years ago    Drug use: No    Sexual activity: Not on file   Lifestyle    Physical activity     Days per week: Not on file     Minutes per session: Not on file    Stress: Not on file   Relationships    Social connections     Talks on phone: Not on file     Gets together: Not on file     Attends Shinto service: Not on file     Active member of club or organization: Not on file     Attends meetings of clubs or organizations: Not on file     Relationship status: Not on file    Intimate partner violence     Fear of current or ex partner: Not on file     Emotionally abused: Not on file     Physically abused: Not on file     Forced sexual activity: Not on file   Other Topics Concern    Not on file   Social History Narrative    Not on file     Exam:  Gen: Well-appearing, breathing comfortably, no distress, no stridor, weak voice  Nose: Septum midline, no bleeding  Mouth: Mucosa moist without lesion of the tongue, FOM, palate or buccal mucosa  Pharynx: Without lesion  Neck: Supple, without adenopathy, trachea midline, no thyromegaly. Specifically, I do not identify muscular tension of the SCM. Chin is midline, not turned to either side. Head is erect, not tilted to either side. A: Neck pain. Likely myofascial inflammation, perhaps associated with his degerative cervical arthritis. Rec: Consider muscle relaxants, heat, physical therapy. Short course of steroids, if not contraindicated. Consider pain management evaluation, if the symptoms persist.  I do not perform Botox injections but I am doubtful of any potential benefit from Botox, as this does not appear to be torticollis (no tension of the SCM, no head/chin tilt).

## 2020-08-11 NOTE — PROGRESS NOTES
Pulmonary and Critical Care  Progress Note    Subjective: The patient is improving. Shortness of breath none. Chest pain none. Addressing respiratory complaints Patient is negative for  hemoptysis and cyanosis  CONSTITUTIONAL:  negative for fevers and chills      Past Medical History:     has a past medical history of Anxiety, Depression, Hypertension, and Nerves. has a past surgical history that includes joint replacement. reports that he quit smoking about 45 years ago. His smokeless tobacco use includes chew. He reports that he does not drink alcohol or use drugs. Family history:  family history includes Cancer in his father, mother, and sister. No Known Allergies  Social History:    Reviewed; no changes    Objective:   PHYSICAL EXAM:        VITALS:  BP (!) 144/57   Pulse 77   Temp 98.6 °F (37 °C)   Resp 16   Ht 5' 8\" (1.727 m)   Wt 200 lb 14.4 oz (91.1 kg)   SpO2 93%   BMI 30.55 kg/m²     24HR INTAKE/OUTPUT:      Intake/Output Summary (Last 24 hours) at 8/11/2020 1101  Last data filed at 8/11/2020 0800  Gross per 24 hour   Intake 640 ml   Output 2300 ml   Net -1660 ml       CONSTITUTIONAL:  Awake. LUNGS:  decreased breath sounds, occ basilar crackles. CARDIOVASCULAR:  normal S1 and S2 and negative JVD  ABD:Abdomen soft, non-tender. BS normal. No masses,  No organomegaly  NEURO:Alert. DATA:    CBC:  Recent Labs     08/09/20  0342 08/10/20  0406 08/11/20  0121   WBC 7.3 7.7 10.5   RBC 3.83* 3.82* 4.18*   HGB 10.8* 10.5* 11.6*   HCT 34.2* 34.6* 38.2*    247 278   MCV 89.3 90.6 91.4   MCH 28.2 27.5 27.8   MCHC 31.6* 30.3* 30.4*   RDW 15.0* 14.8 14.6      BMP:  Recent Labs     08/09/20  0342 08/10/20  0406 08/11/20  0121    137 133*   K 4.7 5.2* 5.2*    101 98*   CO2 25 29 26   BUN 38* 35* 34*   CREATININE 1.5* 1.5* 1.4*   CALCIUM 7.8* 7.9* 8.3   GLUCOSE 124* 105* 135*      ABG:  No results for input(s): PH, PO2ART, QBW0VXS, HCO3, BEART, O2SAT in the last 72 hours.   Lab

## 2020-08-12 ENCOUNTER — ANESTHESIA (OUTPATIENT)
Dept: ENDOSCOPY | Age: 85
DRG: 871 | End: 2020-08-12
Payer: MEDICARE

## 2020-08-12 ENCOUNTER — ANESTHESIA EVENT (OUTPATIENT)
Dept: ENDOSCOPY | Age: 85
DRG: 871 | End: 2020-08-12
Payer: MEDICARE

## 2020-08-12 VITALS — DIASTOLIC BLOOD PRESSURE: 50 MMHG | OXYGEN SATURATION: 93 % | SYSTOLIC BLOOD PRESSURE: 98 MMHG

## 2020-08-12 LAB
ALBUMIN SERPL-MCNC: 2.9 GM/DL (ref 3.4–5)
ALP BLD-CCNC: 230 IU/L (ref 40–129)
ALT SERPL-CCNC: 94 U/L (ref 10–40)
ANION GAP SERPL CALCULATED.3IONS-SCNC: 10 MMOL/L (ref 4–16)
AST SERPL-CCNC: 72 IU/L (ref 15–37)
BILIRUB SERPL-MCNC: 0.3 MG/DL (ref 0–1)
BUN BLDV-MCNC: 38 MG/DL (ref 6–23)
CALCIUM SERPL-MCNC: 8.5 MG/DL (ref 8.3–10.6)
CHLORIDE BLD-SCNC: 100 MMOL/L (ref 99–110)
CO2: 26 MMOL/L (ref 21–32)
CREAT SERPL-MCNC: 1.6 MG/DL (ref 0.9–1.3)
GFR AFRICAN AMERICAN: 50 ML/MIN/1.73M2
GFR NON-AFRICAN AMERICAN: 41 ML/MIN/1.73M2
GLUCOSE BLD-MCNC: 137 MG/DL (ref 70–99)
MAGNESIUM: 2.2 MG/DL (ref 1.8–2.4)
POTASSIUM SERPL-SCNC: 5.2 MMOL/L (ref 3.5–5.1)
SODIUM BLD-SCNC: 136 MMOL/L (ref 135–145)
TOTAL PROTEIN: 5.6 GM/DL (ref 6.4–8.2)

## 2020-08-12 PROCEDURE — 3700000000 HC ANESTHESIA ATTENDED CARE: Performed by: SPECIALIST

## 2020-08-12 PROCEDURE — 97530 THERAPEUTIC ACTIVITIES: CPT

## 2020-08-12 PROCEDURE — 3700000001 HC ADD 15 MINUTES (ANESTHESIA): Performed by: SPECIALIST

## 2020-08-12 PROCEDURE — 94761 N-INVAS EAR/PLS OXIMETRY MLT: CPT

## 2020-08-12 PROCEDURE — 6370000000 HC RX 637 (ALT 250 FOR IP): Performed by: SPECIALIST

## 2020-08-12 PROCEDURE — 1200000000 HC SEMI PRIVATE

## 2020-08-12 PROCEDURE — 88305 TISSUE EXAM BY PATHOLOGIST: CPT

## 2020-08-12 PROCEDURE — 2709999900 HC NON-CHARGEABLE SUPPLY: Performed by: SPECIALIST

## 2020-08-12 PROCEDURE — 0DB98ZX EXCISION OF DUODENUM, VIA NATURAL OR ARTIFICIAL OPENING ENDOSCOPIC, DIAGNOSTIC: ICD-10-PCS | Performed by: SPECIALIST

## 2020-08-12 PROCEDURE — 99232 SBSQ HOSP IP/OBS MODERATE 35: CPT | Performed by: PHYSICIAN ASSISTANT

## 2020-08-12 PROCEDURE — 3609012400 HC EGD TRANSORAL BIOPSY SINGLE/MULTIPLE: Performed by: SPECIALIST

## 2020-08-12 PROCEDURE — 2580000003 HC RX 258: Performed by: NURSE ANESTHETIST, CERTIFIED REGISTERED

## 2020-08-12 PROCEDURE — 80053 COMPREHEN METABOLIC PANEL: CPT

## 2020-08-12 PROCEDURE — 94640 AIRWAY INHALATION TREATMENT: CPT

## 2020-08-12 PROCEDURE — 2500000003 HC RX 250 WO HCPCS: Performed by: NURSE ANESTHETIST, CERTIFIED REGISTERED

## 2020-08-12 PROCEDURE — 87077 CULTURE AEROBIC IDENTIFY: CPT

## 2020-08-12 PROCEDURE — 2580000003 HC RX 258: Performed by: ANESTHESIOLOGY

## 2020-08-12 PROCEDURE — 83735 ASSAY OF MAGNESIUM: CPT

## 2020-08-12 PROCEDURE — 6360000002 HC RX W HCPCS: Performed by: NURSE ANESTHETIST, CERTIFIED REGISTERED

## 2020-08-12 PROCEDURE — 6360000002 HC RX W HCPCS: Performed by: SPECIALIST

## 2020-08-12 RX ORDER — LIDOCAINE HYDROCHLORIDE 20 MG/ML
INJECTION, SOLUTION INFILTRATION; PERINEURAL PRN
Status: DISCONTINUED | OUTPATIENT
Start: 2020-08-12 | End: 2020-08-12 | Stop reason: SDUPTHER

## 2020-08-12 RX ORDER — PANTOPRAZOLE SODIUM 40 MG/1
40 TABLET, DELAYED RELEASE ORAL
Status: DISCONTINUED | OUTPATIENT
Start: 2020-08-12 | End: 2020-08-13 | Stop reason: HOSPADM

## 2020-08-12 RX ORDER — SODIUM CHLORIDE 9 MG/ML
INJECTION, SOLUTION INTRAVENOUS CONTINUOUS PRN
Status: DISCONTINUED | OUTPATIENT
Start: 2020-08-12 | End: 2020-08-12 | Stop reason: SDUPTHER

## 2020-08-12 RX ORDER — PROPOFOL 10 MG/ML
INJECTION, EMULSION INTRAVENOUS PRN
Status: DISCONTINUED | OUTPATIENT
Start: 2020-08-12 | End: 2020-08-12 | Stop reason: SDUPTHER

## 2020-08-12 RX ORDER — SODIUM CHLORIDE, SODIUM LACTATE, POTASSIUM CHLORIDE, CALCIUM CHLORIDE 600; 310; 30; 20 MG/100ML; MG/100ML; MG/100ML; MG/100ML
INJECTION, SOLUTION INTRAVENOUS CONTINUOUS
Status: DISCONTINUED | OUTPATIENT
Start: 2020-08-12 | End: 2020-08-13 | Stop reason: HOSPADM

## 2020-08-12 RX ADMIN — SODIUM CHLORIDE: 9 INJECTION, SOLUTION INTRAVENOUS at 15:11

## 2020-08-12 RX ADMIN — METOPROLOL SUCCINATE 25 MG: 25 TABLET, EXTENDED RELEASE ORAL at 17:04

## 2020-08-12 RX ADMIN — IPRATROPIUM BROMIDE 2 PUFF: 17 AEROSOL, METERED RESPIRATORY (INHALATION) at 08:11

## 2020-08-12 RX ADMIN — COLLAGENASE SANTYL: 250 OINTMENT TOPICAL at 11:39

## 2020-08-12 RX ADMIN — ENOXAPARIN SODIUM 30 MG: 30 INJECTION SUBCUTANEOUS at 17:04

## 2020-08-12 RX ADMIN — IPRATROPIUM BROMIDE 2 PUFF: 17 AEROSOL, METERED RESPIRATORY (INHALATION) at 11:39

## 2020-08-12 RX ADMIN — SODIUM CHLORIDE, POTASSIUM CHLORIDE, SODIUM LACTATE AND CALCIUM CHLORIDE: 600; 310; 30; 20 INJECTION, SOLUTION INTRAVENOUS at 14:53

## 2020-08-12 RX ADMIN — QUETIAPINE FUMARATE 100 MG: 100 TABLET ORAL at 20:40

## 2020-08-12 RX ADMIN — PANTOPRAZOLE SODIUM 40 MG: 40 TABLET, DELAYED RELEASE ORAL at 17:03

## 2020-08-12 RX ADMIN — GLUCAGON HYDROCHLORIDE 0.5 MG: KIT at 15:23

## 2020-08-12 RX ADMIN — PROPOFOL 120 MG: 10 INJECTION, EMULSION INTRAVENOUS at 15:16

## 2020-08-12 RX ADMIN — ALBUTEROL SULFATE 2 PUFF: 108 AEROSOL, METERED RESPIRATORY (INHALATION) at 11:41

## 2020-08-12 RX ADMIN — MIRTAZAPINE 45 MG: 15 TABLET, FILM COATED ORAL at 20:40

## 2020-08-12 RX ADMIN — ALBUTEROL SULFATE 2 PUFF: 108 AEROSOL, METERED RESPIRATORY (INHALATION) at 08:13

## 2020-08-12 RX ADMIN — LIDOCAINE HYDROCHLORIDE 100 MG: 20 INJECTION, SOLUTION INFILTRATION; PERINEURAL at 15:16

## 2020-08-12 RX ADMIN — OXYCODONE HYDROCHLORIDE AND ACETAMINOPHEN 1 TABLET: 5; 325 TABLET ORAL at 20:39

## 2020-08-12 ASSESSMENT — ENCOUNTER SYMPTOMS
NAUSEA: 1
PHOTOPHOBIA: 0
SORE THROAT: 0
CHOKING: 0
EYE ITCHING: 0
CONSTIPATION: 0
COLOR CHANGE: 0
BACK PAIN: 0
APNEA: 0
ANAL BLEEDING: 0
RECTAL PAIN: 0
EYE REDNESS: 0
ABDOMINAL PAIN: 1
STRIDOR: 0

## 2020-08-12 ASSESSMENT — PAIN SCALES - GENERAL: PAINLEVEL_OUTOF10: 6

## 2020-08-12 ASSESSMENT — PAIN DESCRIPTION - LOCATION: LOCATION: GENERALIZED

## 2020-08-12 ASSESSMENT — PAIN DESCRIPTION - PAIN TYPE: TYPE: ACUTE PAIN

## 2020-08-12 NOTE — PROGRESS NOTES
Dr. Sergio Nickerson spoke to pt daughter Moises Caballero regarding consent for EGD/duodenoscopy to be performed this morning. Consent witnessed by this RN and Davonte Coyne RN via telephone. Consent signed by this RN, Davonte Coyne RN, and Dr. Sergio Nickerson. Consent placed in soft chart.

## 2020-08-12 NOTE — ANESTHESIA POSTPROCEDURE EVALUATION
Department of Anesthesiology  Postprocedure Note    Patient: Nancie Xiao  MRN: [id number]  YOB: 1932  Date of evaluation: 8/12/2020  Time:  3:48 PM     Procedure Summary     Date:  08/12/20 Room / Location:  [address]    Anesthesia Start:  8203 Anesthesia Stop:  7297    Procedure:  EGD BIOPSY (N/A ) Diagnosis:  (-)    Surgeon:  Ceci Nunez MD Responsible Provider:  RAYMUNDO Dukes CRNA    Anesthesia Type:  MAC ASA Status:  3          Anesthesia Type: MAC    Lotus Phase I:  10    Lotus Phase II:  10    Last vitals: Reviewed and per EMR flowsheets.        Anesthesia Post Evaluation    Patient location during evaluation: bedside  Patient participation: complete - patient participated  Level of consciousness: awake and alert  Pain score: 0  Airway patency: patent  Nausea & Vomiting: no nausea and no vomiting  Complications: no  Cardiovascular status: hemodynamically stable  Respiratory status: acceptable, room air, spontaneous ventilation and nonlabored ventilation  Hydration status: euvolemic

## 2020-08-12 NOTE — PROGRESS NOTES
General Surgery  Dr. Karina Wise and Jennie Hidalgo, [facility] Day: 12    Chief Complaint on Admission: hypoxia      Subjective:     Sylvie Barton is a 80 y.o. male with   cholelithiasis and elevated LFTs that have been trending down. Patient reports abdominal pain and nausea. PT is NPO for an EGD and duodenoscopy with Dr. Terrie Morgan this afternoon. Pt is a poor historian, but is able to answer my questions this morning. ROS:  Review of Systems   Constitutional: Negative for chills and fever. HENT: Negative for ear pain, mouth sores, sore throat and tinnitus. Eyes: Negative for photophobia, redness and itching. Respiratory: Negative for apnea, choking and stridor. Cardiovascular: Negative for chest pain and palpitations. Gastrointestinal: Positive for abdominal pain and nausea. Negative for anal bleeding, constipation and rectal pain. Endocrine: Negative for polydipsia. Genitourinary: Negative for enuresis, flank pain and hematuria. Musculoskeletal: Positive for neck stiffness. Negative for back pain, joint swelling and myalgias. Skin: Negative for color change and pallor. Allergic/Immunologic: Negative for environmental allergies. Neurological: Negative for syncope and speech difficulty. Psychiatric/Behavioral: Negative for confusion and hallucinations. Allergies  Patient has no known allergies. Diagnosis Date    Anxiety     Depression     Hypertension     Nerves        Objective:     Vitals:    20 0813   BP:    Pulse:    Resp: 16   Temp:    SpO2: 92%       TEMPERATURE:  Current - Temp: 98.1 °F (36.7 °C); Max - Temp  Av.6 °F (37 °C)  Min: 98.1 °F (36.7 °C)  Max: 99 °F (37.2 °C)    No intake/output data recorded. I/O last 3 completed shifts: In: 280 [P.O.:280]  Out: 1200 [Urine:1200]      Physical Exam:  Physical Exam  Constitutional:       Appearance: He is well-developed. HENT:      Head: Normocephalic.    Eyes:      Pupils: Pupils are equal, round, and reactive to light. Neck:      Musculoskeletal: Normal range of motion and neck supple. Cardiovascular:      Rate and Rhythm: Normal rate. Pulmonary:      Effort: Pulmonary effort is normal.   Abdominal:      General: There is no distension. Palpations: Abdomen is soft. There is no mass. Tenderness: There is abdominal tenderness. There is no guarding or rebound. Musculoskeletal: Normal range of motion. Skin:     General: Skin is warm. Neurological:      Mental Status: He is alert and oriented to person, place, and time.              Scheduled Meds:   collagenase   Topical Daily    mirtazapine  45 mg Oral Nightly    QUEtiapine  100 mg Oral BID    metoprolol succinate  25 mg Oral Daily    albuterol sulfate HFA  2 puff Inhalation 4x daily    ipratropium  2 puff Inhalation 4x daily    sodium chloride flush  10 mL Intravenous 2 times per day    enoxaparin  30 mg Subcutaneous Daily     Continuous Infusions:  PRN Meds:oxyCODONE-acetaminophen, cloNIDine, sodium chloride flush, polyethylene glycol, promethazine **OR** ondansetron      Labs/Imaging Results:   Lab Results   Component Value Date    WBC 10.5 08/11/2020    HGB 11.6 (L) 08/11/2020    HCT 38.2 (L) 08/11/2020    MCV 91.4 08/11/2020     08/11/2020     Lab Results   Component Value Date     (L) 08/11/2020    K 5.2 (H) 08/11/2020    CL 98 (L) 08/11/2020    CO2 26 08/11/2020    BUN 34 (H) 08/11/2020    CREATININE 1.4 (H) 08/11/2020    GLUCOSE 135 (H) 08/11/2020    CALCIUM 8.3 08/11/2020    PROT 5.5 (L) 08/11/2020    LABALBU 2.7 (L) 08/11/2020    BILITOT 0.3 08/11/2020    ALKPHOS 271 (H) 08/11/2020    AST 82 (H) 08/11/2020     (H) 08/11/2020    LABGLOM 48 (L) 08/11/2020    GFRAA 58 (L) 08/11/2020       Assessment:     Patient Active Problem List:     Pneumonia due to organism     ARF (acute respiratory failure) ([facility] [address]     Essential hypertension, benign     Depressive disorder, not elsewhere classified     [facility]     Metabolic encephalopathy     SOB (shortness of breath)     Elevated liver enzymes      Plan:     Pt to have EGD and duodenoscopy this afternoon with Dr. Dhruv Chirinos. Will follow these results. Further treatment recs pending endoscopy results.      Amando Edmonds PA-C

## 2020-08-12 NOTE — PROGRESS NOTES
[address]    Hospitalist Progress Note      Name:  Rosalinda Fall /Age/Sex: 1932  (80 y.o. male)   MRN & CSN:  [id number] & [id number] Admission Date/Time: 2020 11:57 PM   Location:  [address] [place] [address] PCP: Pino Raymundo MD         Hospital Day: 12    Assessment and Plan:   Rosalinda Fall is a 80 y.o.  male  who presents with SOB (shortness of breath)    Pneumonia- community-acquired:    Chest x-ray with right upper lobe airspace opacity, current status unknown  On oxygen by nasal cannula at 1 to 2 L/min  Last febrile episode [address] August 3, 2020, WBC trending down. Completed Rocephin and azithromycin. Respiratory disease panel negative, urine Legionella urine Streptococcus negative  Repeat chest x-ray with trace right pleural effusion and pulmonary vascular congestion -given Lasix. Blood culture negative. Order follow up CXR showed clearance of atelectasis     Acute neck stiffness with pain:     On lidocaine patch, continue on pain medication  CT cervical nonacute  Responded better with Benadryl  Evaluated by ENT     Elevated liver function tests - improving     Elevation started once antibiotics were started    Could be medication induced. Hepatitis panel negative. Ultrasound with cholelithiasis, normal liver echogenicity -   Avoid hepatotoxic Medication  LFT fluctuating, continue to monitor LFTs  GI recommended  EGD/Colonoscopy  -  patient is stable for the mentioned procedures    Acute kidney injury on chronic kidney disease stage II    Creatinine 1.5.,  Baseline 1.4. Avoid nephrotoxic agent.      Mild hyperkalemia : 5.2 repeat serum potassium and  hold potassium phosphate supplement     Chronic issues     Hypertension   Depression  COPD with no exacerbation    Resolved issues     Metabolic encephalopathy     Diet Dietary Nutrition Supplements: Frozen Oral Supplement  DIET GENERAL; Mildly Thick (Nectar)   DVT Prophylaxis [] Lovenox, []  Heparin, [] SCDs, []No VTE prophylaxis, patient ambulating   GI Prophylaxis [] PPI, [] H2 Blocker, [] No GI prophylaxis, patient is receiving diet/Tube Feeds   Code Status Full Code   Disposition Patient requires continued admission due to pneumonia   MDM [] Low, [x] Moderate,[]  High     History of Present Illness: Subjective     Patient Seen & Examined at the bedside      Patient is resting in bed with no distress -denies any shortness of breath or chest pain  Denies any abdominal pain  No new symptoms     Ten point ROS reviewed negative, unless as noted above    Objective: Intake/Output Summary (Last 24 hours) at 8/12/2020 1408  Last data filed at 8/12/2020 0413  Gross per 24 hour   Intake 120 ml   Output 1200 ml   Net -1080 ml      Vitals:   Vitals:    08/12/20 1141   BP:    Pulse:    Resp: 16   Temp:    SpO2: 93%     Physical Exam:    GEN Awake male, resting in bed in no apparent distress. Appears given age. HENT Mucous membranes are moist.   RESP Clear to auscultation, no wheezes, rales or rhonchi. CARDIO/VASC -S1/S2 auscultated. Regular rate without appreciable murmurs, rubs, or gallops. Peripheral pulses equal bilaterally and palpable. No peripheral edema. GI Abdomen is soft without significant tenderness, masses, or guarding. Bowel sounds are normoactive. Rectal exam deferred.  Greene catheter is not present.     Medications:   Medications:    collagenase   Topical Daily    mirtazapine  45 mg Oral Nightly    QUEtiapine  100 mg Oral BID    metoprolol succinate  25 mg Oral Daily    albuterol sulfate HFA  2 puff Inhalation 4x daily    ipratropium  2 puff Inhalation 4x daily    sodium chloride flush  10 mL Intravenous 2 times per day    enoxaparin  30 mg Subcutaneous Daily      Infusions:   PRN Meds: oxyCODONE-acetaminophen, 1 tablet, Q4H PRN  cloNIDine, 0.1 mg, Q6H PRN  sodium chloride flush, 10 mL, PRN  polyethylene glycol, 17 g, Daily PRN  promethazine, 12.5 mg, Q6H PRN    Or  ondansetron, 4 mg, Q6H PRN          Electronically signed by Mariano Reynolds MD on 8/12/2020 at 2:08 PM

## 2020-08-12 NOTE — PLAN OF CARE
Problem: Falls - Risk of:  Goal: Will remain free from falls  Description: Will remain free from falls  Outcome: Ongoing  Goal: Absence of physical injury  Description: Absence of physical injury  Outcome: Ongoing     Problem: Skin Integrity:  Goal: Will show no infection signs and symptoms  Description: Will show no infection signs and symptoms  Outcome: Ongoing  Goal: Absence of new skin breakdown  Description: Absence of new skin breakdown  Outcome: Ongoing     Problem: Confusion - Acute:  Goal: Absence of continued neurological deterioration signs and symptoms  Description: Absence of continued neurological deterioration signs and symptoms  Outcome: Ongoing  Goal: Mental status will be restored to baseline  Description: Mental status will be restored to baseline  Outcome: Ongoing     Problem: Discharge Planning:  Goal: Ability to perform activities of daily living will improve  Description: Ability to perform activities of daily living will improve  Outcome: Ongoing  Goal: Participates in care planning  Description: Participates in care planning  Outcome: Ongoing     Problem: Injury - Risk of, Physical Injury:  Goal: Will remain free from falls  Description: Will remain free from falls  Outcome: Ongoing  Goal: Absence of physical injury  Description: Absence of physical injury  Outcome: Ongoing     Problem: Mood - Altered:  Goal: Mood stable  Description: Mood stable  Outcome: Ongoing  Goal: Absence of abusive behavior  Description: Absence of abusive behavior  Outcome: Ongoing  Goal: Verbalizations of feeling emotionally comfortable while being cared for will increase  Description: Verbalizations of feeling emotionally comfortable while being cared for will increase  Outcome: Ongoing     Problem: Psychomotor Activity - Altered:  Goal: Absence of psychomotor disturbance signs and symptoms  Description: Absence of psychomotor disturbance signs and symptoms  Outcome: Ongoing     Problem: Sensory Perception - appropriately will improve  Outcome: Ongoing  Goal: Will remain free from infection  Description: Will remain free from infection  Outcome: Ongoing  Goal: Incidence of incontinence will decrease  Description: Incidence of incontinence will decrease  Outcome: Ongoing     Problem: Health Behavior:  Goal: Compliance with treatment plan for underlying cause of condition will improve  Description: Compliance with treatment plan for underlying cause of condition will improve  Outcome: Ongoing  Goal: Identification of resources available to assist in meeting health care needs will improve  Description: Identification of resources available to assist in meeting health care needs will improve  Outcome: Ongoing     Problem: Fluid Volume:  Goal: Maintenance of adequate hydration will improve  Description: Maintenance of adequate hydration will improve  Outcome: Ongoing     Problem: Cardiac Output - Decreased:  Goal: Hemodynamic stability will improve  Description: Hemodynamic stability will improve  Outcome: Ongoing

## 2020-08-12 NOTE — PROGRESS NOTES
Physical Therapy    Physical Therapy Treatment Note  Name: Sylvie Barton MRN: [id number] :   1932   Date:  2020   Admission Date: 2020 Room:  [address] [place] [address]-A   Restrictions/Precautions:        fall risk  Communication with other providers:  Per nurse ok to tx and request pt be on right side due to blisters on left side. Subjective:  Patient states:  Agreeable to tx  Pain:   Location, Type, Intensity (0/10 to 10/10):  Pt has sacral wound and c/o bottom sore but did not rate. Objective:    Observation:  Alert and oriented to self  Treatment, including education/measures:  Scooting to [facility] with max assist of 2  Scooting in sitting mod assist of 2 using bed pad  Rolling left and right with mod assist and left on right side at end of tx session supported with pillows. Sup<=>sit min assist of 2 and cues   Sit<=>stand from elevated bed and bilateral rails min assist of 2   Pt stood with bilateral UE support on bed rails and min assist of 2 x 4 reps tolerating 45 secs with first stand and 2-30 secs with each additional stand working on wt shift and progressing to stepping place to taking 2-3 steps forward and back. pts left knee did buckle x 1 on last stand with stepping in place but recovered with mod assist.  Safety  Patient left safely in the bed, with call light/phone in reach with alarm applied. Gait belt was used for transfers and gait. Assessment / Impression:       Patient's tolerance of treatment:  good   Adverse Reaction: na  Significant change in status and impact:  na  Barriers to improvement:  Strength and safety  Plan for Next Session:    Cont.  POC  Time in:  1039  Time out:  1108  Timed treatment minutes:  29  Total treatment time:  29    Previously filed items:  Social/Functional History  Lives With: Son  Type of Home: House  Home Layout: Two level  Home Access: Stairs to enter with rails  Entrance Stairs - Number of Steps: Pt uncertain  Entrance Stairs - Rails: Both  Home Equipment: Cane, Rolling walker  ADL Assistance: Independent  Homemaking Assistance: Independent  Ambulation Assistance: Independent(mod I with cane or RW)  Transfer Assistance: Independent  Active : No(Son drives)  Occupation: Retired  Short term goals  Time Frame for Trudi Verdugo term goals: 1 week  Short term goal 1: Patient will complete bed mobility Min A  Short term goal 2: Patient will perform STS x5 with LRAD and Mod A. Short term goal 3: Patient will ambulate x6ft with LRAD and Mod A.        Electronically signed by:    Arnol Reyes PTA  8/12/2020, 8:21 AM

## 2020-08-12 NOTE — CARE COORDINATION
LSW [facility] and informed her that pt has updated PT/OT notes in chart. Symone Gonzalez will restart precert for pt.

## 2020-08-12 NOTE — PROGRESS NOTES
EGD /duodenoscopy scheduled for 12:15 today  Discussed with his daughter Aracelis Reed    I have examined the patient within 24 hours  before the procedure and there is no change in the previous history and physical exam,which has been reviewed. There is no history of sleep apnea, snoring, or stridor. There has been no  previous adverse experience with sedation/anesthesia. There is no increased risk for aspiration of gastric contents. The patient has been instructed that all resuscitative measures (during the operative and immediate perioperative period) will be instituted in the unlikely event that they will be needed. The patient has no pertinent past surgical or FH other than listed in the original H&P.     ASA Class: 4  AIRWAY Class: 1    Consent form signed, witnessed and in soft chart

## 2020-08-12 NOTE — PROGRESS NOTES
Pt transported to [address] from endoscopy on monitor. Pt alert, responds appropriately.  VSS, as charted in flowsheets

## 2020-08-12 NOTE — PROGRESS NOTES
Pulmonary and Critical Care  Progress Note    Subjective: The patient is better. EGD today. Shortness of breath better. Chest pain none. Addressing respiratory complaints Patient is negative for  hemoptysis and cyanosis  CONSTITUTIONAL:  negative for fevers and chills. Past Medical History:     has a past medical history of Anxiety, Depression, Hypertension, and Nerves. has a past surgical history that includes joint replacement. reports that he quit smoking about 45 years ago. His smokeless tobacco use includes chew. He reports that he does not drink alcohol or use drugs. Family history:  family history includes Cancer in his father, mother, and sister. No Known Allergies  Social History:    Reviewed; no changes    Objective:   PHYSICAL EXAM:        VITALS:  /72   Pulse 59   Temp 98.1 °F (36.7 °C) (Oral)   Resp 16   Ht 5' 8\" (1.727 m)   Wt 198 lb 12.8 oz (90.2 kg)   SpO2 92%   BMI 30.23 kg/m²     24HR INTAKE/OUTPUT:      Intake/Output Summary (Last 24 hours) at 8/12/2020 1052  Last data filed at 8/12/2020 0413  Gross per 24 hour   Intake 120 ml   Output 1200 ml   Net -1080 ml       CONSTITUTIONAL:  Awake. LUNGS:  decreased breath sounds, occ basilar crackles. CARDIOVASCULAR:  normal S1 and S2 and negative JVD  ABD:Abdomen soft, non-tender. BS normal. No masses,  No organomegaly  NEURO:Alert. DATA:    CBC:  Recent Labs     08/10/20  0406 08/11/20  0121   WBC 7.7 10.5   RBC 3.82* 4.18*   HGB 10.5* 11.6*   HCT 34.6* 38.2*    278   MCV 90.6 91.4   MCH 27.5 27.8   MCHC 30.3* 30.4*   RDW 14.8 14.6      BMP:  Recent Labs     08/10/20  0406 08/11/20  0121    133*   K 5.2* 5.2*    98*   CO2 29 26   BUN 35* 34*   CREATININE 1.5* 1.4*   CALCIUM 7.9* 8.3   GLUCOSE 105* 135*      ABG:  No results for input(s): PH, PO2ART, PDB3CHB, HCO3, BEART, O2SAT in the last 72 hours.   Lab Results   Component Value Date    PROBNP 1,687 (H) 08/05/2020    PROBNP 1,904 (H) 08/04/2020    PROBNP 542.4 (H) 08/02/2020     No results found for: [facility]    Radiology Review:  Pertinent images / reports were reviewed as a part of this visit. Assessment:     Patient Active Problem List   Diagnosis    Pneumonia due to organism    [facility]acute [facility] failure) ([facility] [address]    Essential hypertension, benign    Depressive disorder, not elsewhere classified    Acute renal failure ([facility]    Metabolic encephalopathy    SOB (shortness of breath)    Elevated liver enzymes       Plan:   1. Overall the patient has improved. 2. EGD today.     Vernita Harada MD  8/12/2020  10:52 AM

## 2020-08-12 NOTE — PROGRESS NOTES
Occupational Therapy  . Occupational Therapy Treatment Note  Name: Trudi Goldstein MRN: [id number] :   1932   Date:  2020   Admission Date: 2020 Room:  [address] [place], [place] [address]A   Restrictions/Precautions:    General precautions; Fall Risk, Sacral Wound and blister on L lateral trunk    Communication with other providers:  Per chart review and Nurse Natacha, patient is appropriate for therapeutic intervention. Co-Tx c PTA Toney Juarez. Per nurse request for avoiding pressure to blister on L-trunk, pt was repositioned into R-side lying at end of Tx session. Subjective:  Patient states:  Pt agreeable to Tx session. Pain:   Location, Type, Intensity (0/10 to 10/10):  Pain in buttocks, pt unable to provide numeric rating, reports less pain when standing or side-lying. Objective:    Observation:  Pt received in supine c L-hip off loaded. Objective Measures:  93% O2 sat on 3L NC, HR 63, Greene catheter    Treatment, including education:  Therapeutic Activity Training:   Therapeutic activity training was instructed today. Cues were given for safety, sequence, UE/LE placement, awareness, and balance. Activities performed today included bed mobility training, sup-sit, sit-stand, SPT. Rolling: Min A x2 + cues for bed rail rolling R<>L  Sit to supine: Mod A (for trunk) + Min A for LE + cues for bed rail  Supine to sit: Min A x2 + cues for bed rail  Scooting: Mod A x2 c chux pad  Sit to stand: Min A x2 c B bed rails and c RW x4 trials (elevated surface) + cues for posture / safe body positioning  Stand to sit: CGA x2 c B bed rails and c RW  Standing balance / tolerance: CGA x2 c B bed rails and RW for stands x4, 45-60 seconds each c pt marching in placed, weight shifting, and stepping forward / back 1-2 steps each. This therapist provided additional cues for posture. Pt required seated rest breaks between each stand. See PT note for additional details of Tx session.     All therapeutic intervention performed c

## 2020-08-12 NOTE — ANESTHESIA PRE PROCEDURE
Department of Anesthesiology  Preprocedure Note       Name:  Rodney Rios   Age:  80 y.o.  :  1932                                          MRN:  [id number]         Date:  2020      Surgeon: Barbara Hunter):  Max Cagle MD    Procedure: Procedure(s):  EGD ESOPHAGOGASTRODUODENOSCOPY    Medications prior to admission:   Prior to Admission medications    Medication Sig Start Date End Date Taking? Authorizing Provider   furosemide (LASIX) 40 MG tablet Take 1 tablet by mouth daily for 3 days 8/7/20 8/10/20 Yes Jada Montgomery MD   LORazepam (ATIVAN) 1 MG tablet Take 1 mg by mouth 2 times daily. At breakfast and lunch   Yes Historical Provider, MD   LORazepam (ATIVAN) 1 MG tablet Take 2 mg by mouth nightly. Yes Historical Provider, MD   cloNIDine (CATAPRES) 0.1 MG tablet Take 1 tablet by mouth every 6 hours as needed (for B/P systolic > 446). 12/15/14  Yes Dana Means MD   pantoprazole (PROTONIX) 40 MG tablet Take 1 tablet by mouth every morning (before breakfast). 12/15/14  Yes Dana Means MD   QUEtiapine (SEROQUEL) 100 MG tablet Take 100 mg by mouth 2 times daily. Yes Historical Provider, MD   metoprolol (TOPROL-XL) 25 MG XL tablet Take 25 mg by mouth daily. Yes Historical Provider, MD   mirtazapine (REMERON) 45 MG tablet Take 45 mg by mouth nightly.      Yes Historical Provider, MD       Current medications:    Current Facility-Administered Medications   Medication Dose Route Frequency Provider Last Rate Last Dose    collagenase ointment   Topical Daily Zenaida Gallardo MD        oxyCODONE-acetaminophen (PERCOCET) 5-325 MG per tablet 1 tablet  1 tablet Oral Q4H PRN Jada Montgomery MD   1 tablet at 20    mirtazapine (REMERON) tablet 45 mg  45 mg Oral Nightly Jada Montgomery MD   45 mg at 20    QUEtiapine (SEROQUEL) tablet 100 mg  100 mg Oral BID Jada Montgomery MD   100 mg at 20    metoprolol succinate (TOPROL XL) extended release tablet 25 mg 25 mg Oral Daily Osmany Anderson MD   25 mg at 20 1420    cloNIDine (CATAPRES) tablet 0.1 mg  0.1 mg Oral Q6H PRN Osmany Anderson MD        albuterol sulfate  (90 Base) MCG/ACT inhaler 2 puff  2 puff Inhalation 4x daily Ashley Roberts MD   2 puff at 20 1141    ipratropium (ATROVENT HFA) 17 MCG/ACT inhaler 2 puff  2 puff Inhalation 4x daily Ashley Roberts MD   2 puff at 20 1139    sodium chloride flush 0.9 % injection 10 mL  10 mL Intravenous 2 times per day Judy Price MD   10 mL at 20 210    sodium chloride flush 0.9 % injection 10 mL  10 mL Intravenous PRN Rupesh Mayo Oneal MD        polyethylene glycol (GLYCOLAX) packet 17 g  17 g Oral Daily PRN Rupesh Mayo Oneal MD        promethazine (PHENERGAN) tablet 12.5 mg  12.5 mg Oral Q6H PRN Rupesh Mayo Oneal MD        Or    ondansetron (ZOFRAN) injection 4 mg  4 mg Intravenous Q6H PRN Rupesh Mayo Oneal MD        enoxaparin (LOVENOX) injection 30 mg  30 mg Subcutaneous Daily Mili LEONE MD   30 mg at 20 8367       Allergies:  No Known Allergies    Problem List:    Patient Active Problem List   Diagnosis Code    Pneumonia due to organism J18.9    [facility] J96.00    Essential hypertension, benign I10    Depressive disorder, not elsewhere classified F32.9    Acute renal failure ([facility] P08.7    Metabolic encephalopathy C42.65    SOB (shortness of breath) R06.02    Elevated liver enzymes R74.8       Past Medical History:        Diagnosis Date    Anxiety     Depression     Hypertension     Nerves        Past Surgical History:        Procedure Laterality Date    JOINT REPLACEMENT      right hip       Social History:    Social History     Tobacco Use    Smoking status: Former Smoker     Last attempt to quit: 1974     Years since quittin.7    Smokeless tobacco: Current User     Types: Chew   Substance Use Topics    Alcohol use: No     Comment: used to drimk on Applicable):   Lab Results   Component Value Date    COVID19 NOT DETECTED 2020         Anesthesia Evaluation  Patient summary reviewed and Nursing notes reviewed no history of anesthetic complications:   Airway: Mallampati: II  TM distance: >3 FB   Neck ROM: full  Mouth opening: > = 3 FB Dental:    (+) edentulous      Pulmonary:normal exam                              ROS comment: Former Smoker   Quit Smokin74     CXR 2020: Impression   Interval improvement in bibasilar atelectasis. Cardiovascular:  Exercise tolerance: poor (<4 METS),   (+) hypertension:, YUSUF:,          Beta Blocker:  Not on Beta Blocker         Neuro/Psych:   (+) psychiatric history:            GI/Hepatic/Renal:             Endo/Other: Negative Endo/Other ROS                    Abdominal:           Vascular: negative vascular ROS. Anesthesia Plan      MAC     ASA 3       Induction: intravenous. Anesthetic plan and risks discussed with patient. RAYMUNDO Kirby CRNA   2020      Pre Anesthesia Evaluation complete. Anesthesia plan, risks, benefits, alternatives, and personnel discussed with patient and/or legal guardian. Patient and/or legal guardian verbalized an understanding and agreed to proceed. Anesthesia plan discussed with care team members and agreed upon.   RAYMUNDO Kirby CRNA  2020

## 2020-08-13 VITALS
WEIGHT: 196 LBS | HEART RATE: 74 BPM | DIASTOLIC BLOOD PRESSURE: 65 MMHG | BODY MASS INDEX: 29.7 KG/M2 | SYSTOLIC BLOOD PRESSURE: 105 MMHG | TEMPERATURE: 98.6 F | RESPIRATION RATE: 16 BRPM | OXYGEN SATURATION: 94 % | HEIGHT: 68 IN

## 2020-08-13 LAB
CEA: 1.9 NG/ML
CLOTEST: NEGATIVE

## 2020-08-13 PROCEDURE — 6370000000 HC RX 637 (ALT 250 FOR IP): Performed by: SPECIALIST

## 2020-08-13 PROCEDURE — 97530 THERAPEUTIC ACTIVITIES: CPT

## 2020-08-13 PROCEDURE — 97535 SELF CARE MNGMENT TRAINING: CPT

## 2020-08-13 PROCEDURE — 6360000002 HC RX W HCPCS: Performed by: SPECIALIST

## 2020-08-13 PROCEDURE — 2580000003 HC RX 258: Performed by: SPECIALIST

## 2020-08-13 PROCEDURE — 86301 IMMUNOASSAY TUMOR CA 19-9: CPT

## 2020-08-13 PROCEDURE — 82378 CARCINOEMBRYONIC ANTIGEN: CPT

## 2020-08-13 PROCEDURE — 94640 AIRWAY INHALATION TREATMENT: CPT

## 2020-08-13 PROCEDURE — 94761 N-INVAS EAR/PLS OXIMETRY MLT: CPT

## 2020-08-13 PROCEDURE — 92526 ORAL FUNCTION THERAPY: CPT

## 2020-08-13 RX ORDER — PANTOPRAZOLE SODIUM 40 MG/1
40 TABLET, DELAYED RELEASE ORAL
Qty: 30 TABLET | Refills: 3 | Status: SHIPPED | OUTPATIENT
Start: 2020-08-13

## 2020-08-13 RX ADMIN — IPRATROPIUM BROMIDE 2 PUFF: 17 AEROSOL, METERED RESPIRATORY (INHALATION) at 21:12

## 2020-08-13 RX ADMIN — SODIUM CHLORIDE, POTASSIUM CHLORIDE, SODIUM LACTATE AND CALCIUM CHLORIDE: 600; 310; 30; 20 INJECTION, SOLUTION INTRAVENOUS at 09:07

## 2020-08-13 RX ADMIN — QUETIAPINE FUMARATE 100 MG: 100 TABLET ORAL at 09:09

## 2020-08-13 RX ADMIN — OXYCODONE HYDROCHLORIDE AND ACETAMINOPHEN 1 TABLET: 5; 325 TABLET ORAL at 09:08

## 2020-08-13 RX ADMIN — ALBUTEROL SULFATE 2 PUFF: 108 AEROSOL, METERED RESPIRATORY (INHALATION) at 16:01

## 2020-08-13 RX ADMIN — MIRTAZAPINE 45 MG: 15 TABLET, FILM COATED ORAL at 20:52

## 2020-08-13 RX ADMIN — PANTOPRAZOLE SODIUM 40 MG: 40 TABLET, DELAYED RELEASE ORAL at 17:21

## 2020-08-13 RX ADMIN — PANTOPRAZOLE SODIUM 40 MG: 40 TABLET, DELAYED RELEASE ORAL at 05:47

## 2020-08-13 RX ADMIN — METOPROLOL SUCCINATE 25 MG: 25 TABLET, EXTENDED RELEASE ORAL at 09:08

## 2020-08-13 RX ADMIN — IPRATROPIUM BROMIDE 2 PUFF: 17 AEROSOL, METERED RESPIRATORY (INHALATION) at 08:02

## 2020-08-13 RX ADMIN — QUETIAPINE FUMARATE 100 MG: 100 TABLET ORAL at 20:52

## 2020-08-13 RX ADMIN — COLLAGENASE SANTYL: 250 OINTMENT TOPICAL at 09:11

## 2020-08-13 RX ADMIN — ENOXAPARIN SODIUM 30 MG: 30 INJECTION SUBCUTANEOUS at 09:08

## 2020-08-13 RX ADMIN — IPRATROPIUM BROMIDE 2 PUFF: 17 AEROSOL, METERED RESPIRATORY (INHALATION) at 16:02

## 2020-08-13 RX ADMIN — ALBUTEROL SULFATE 2 PUFF: 108 AEROSOL, METERED RESPIRATORY (INHALATION) at 08:02

## 2020-08-13 RX ADMIN — ALBUTEROL SULFATE 2 PUFF: 108 AEROSOL, METERED RESPIRATORY (INHALATION) at 21:12

## 2020-08-13 ASSESSMENT — PAIN DESCRIPTION - LOCATION: LOCATION: ABDOMEN

## 2020-08-13 ASSESSMENT — PAIN SCALES - GENERAL
PAINLEVEL_OUTOF10: 0
PAINLEVEL_OUTOF10: 5
PAINLEVEL_OUTOF10: 0
PAINLEVEL_OUTOF10: 5

## 2020-08-13 ASSESSMENT — PAIN DESCRIPTION - PAIN TYPE: TYPE: ACUTE PAIN

## 2020-08-13 ASSESSMENT — PAIN DESCRIPTION - FREQUENCY: FREQUENCY: INTERMITTENT

## 2020-08-13 NOTE — CARE COORDINATION
REBECCA received a message from Char Greene with [employer] and their have received another approval for pt. QUIN[facility] PS doctor and informed him of approval.  PASS is already completed  CM will need a ANIL competed by RN and doctor at discharge. If pt is discharged after hours please complete the following. ... Call report to [phone]   Fax completed AVS with both ANIL on the AVS and any written Rx [phone]. Set up transportation (pt's choice) Jack Luis [phone] or [facility] [phone] and call family.

## 2020-08-13 NOTE — CARE COORDINATION
Received discharge order for pt to go to [facility]. LSW faxed pt AVS with both ANIL to the NH. Med Trans at 9:00-9:15. Pt. RN, pt's dght and NH informed of  time.

## 2020-08-13 NOTE — DISCHARGE SUMMARY
[address] Hospitalist     Discharge Summary    Name:  Sofi Sheehan /Age/Sex: 1932  (80 y.o. male)   MRN & CSN:  [id number] & [id number] Admission Date/Time: 2020 11:57 PM   Attending:  Minerva Velez MD Discharging Physician: Minerva Velez MD     HPI:     Please, see admission HPI in [address] and patient's hospital course below    Hospital Course:   Sofi Sheehan is a 80 y.o.  male  who presents with SOB (shortness of breath) and the following assessments reflect patient's hospital course      Pneumonia- community-acquired - completed treatment      Chest x-ray with right upper lobe airspace opacity, current status unknown  On oxygen by nasal cannula at 1 to 2 L/min  Last febrile episode [address] August 3, 2020, WBC trending down. Completed Rocephin and azithromycin. Respiratory disease panel negative, urine Legionella urine Streptococcus negative  Repeat chest x-ray with trace right pleural effusion and pulmonary vascular congestion -given Lasix. Blood culture negative. Follow up CXR showed clearance      Acute neck stiffness with pain: resolved      On lidocaine patch, continue on pain medication  CT cervical nonacute  Responded better with Benadryl  Evaluated by ENT     Large Duodenal Ulcer on EGD     Protonix BID - H pylori test pending   Gi follow up as OP     Elevated liver function tests - improving      Elevation started once antibiotics were started    Could be medication induced or due to enlarged ampulla of vater   Hepatitis panel negative.  Ultrasound with cholelithiasis, normal liver echogenicity -   Avoid hepatotoxic Medication  LFT fluctuating, continue to monitor LFTs  GI recommended  EGD/Colonoscopy  -     Acute kidney injury on chronic kidney disease stage II - improved      Creatinine close to Baseline 1.4 -1.6 - Avoid nephrotoxic agent.  Was 2 on admit      Mild hyperkalemia : 5.2 repeat serum potassium and  hold potassium phosphate supplement     Chronic issues    Hypertension   Depression  COPD with no exacerbation     Resolved issues      Metabolic encephalopathy     Patient is hemodynamically stable for DC to SNF    The patient expressed appropriate understanding of and agreement with the discharge recommendations, medications, and plan. Consults this admission:  IP CONSULT TO HOSPITALIST  IP CONSULT TO PULMONOLOGY  IP CONSULT TO GI  IP CONSULT TO OTOLARYNGOLOGY  IP CONSULT TO GENERAL SURGERY    Discharge Instruction:   Follow up appointments: GI   Primary care physician:  within 1 to 2 weeks    Diet:  cardiac diet   Activity: activity as tolerated  Disposition: Discharged to:   []Home, []HHC, [x]SNF, []Acute Rehab, []Hospice   Condition on discharge: Stable    Discharge Medications:      Jody Awe   Home Medication Instructions QJD:[id number]    Printed on:08/13/20 8242   Medication Information                      cloNIDine (CATAPRES) 0.1 MG tablet  Take 1 tablet by mouth every 6 hours as needed (for B/P systolic > 060). furosemide (LASIX) 40 MG tablet  Take 1 tablet by mouth daily for 3 days             LORazepam (ATIVAN) 1 MG tablet  Take 1 mg by mouth 2 times daily. At breakfast and lunch             LORazepam (ATIVAN) 1 MG tablet  Take 2 mg by mouth nightly. metoprolol (TOPROL-XL) 25 MG XL tablet  Take 25 mg by mouth daily. mirtazapine (REMERON) 45 MG tablet  Take 45 mg by mouth nightly. pantoprazole (PROTONIX) 40 MG tablet  Take 1 tablet by mouth every morning (before breakfast). QUEtiapine (SEROQUEL) 100 MG tablet  Take 100 mg by mouth 2 times daily.                  Subjective _patient is resting in bed with no distress -appears to be confused-denies any neck pain today - no new complaints     Objective Findings at Discharge:   /77   Pulse 66   Temp 98.1 °F (36.7 °C) (Oral)   Resp 15   Ht 5' 8\" (1.727 m)   Wt 196 lb (88.9 kg)   SpO2 91%   BMI 29.80 kg/m² PHYSICAL EXAM   GEN Awake male, resting in bed in no apparent distress. Appears given age. RESP Clear to auscultation, no wheezes, rales or rhonchi. CARDIO/VAS - S1/S2 auscultated. Regular rate without appreciable murmurs, rubs, or gallops. Peripheral pulses equal bilaterally and palpable. No peripheral edema. GI Abdomen is soft without significant tenderness, masses, or guarding. Bowel sounds are normoactive  MSK No gross joint deformities. Spontaneous movement of all extremities  SKIN Normal coloration, warm, dry. NEURO Cranial nerves appear grossly intact, normal speech, no lateralizing weakness.     BMP/CBC  Recent Labs     08/11/20  0121 08/12/20  2213   * 136   K 5.2* 5.2*   CL 98* 100   CO2 26 26   BUN 34* 38*   CREATININE 1.4* 1.6*   WBC 10.5  --    HCT 38.2*  --      --          Discharge Time of 33 minutes    Electronically signed by Kandy Saavedra MD on 8/13/2020 at 2:26 PM

## 2020-08-13 NOTE — PROGRESS NOTES
Pt had a 10 beat run of vtach up to the 150s. This RN was in the room when it happened and was sitting the head of the pt's bed up for meds following repositioning him. He is back to NSR and HR in 70s now. PAM Saeed NP notified via PS. Telephone orders placed for stat magnesium and potassium.

## 2020-08-13 NOTE — CARE COORDINATION
LSW called [facility] to check on new precert. LSW spoke with doctor and he stated pt is a possible discharge for today. LSW had to leave a message asking Sherlyn Mines to return this LSW call.

## 2020-08-13 NOTE — PROGRESS NOTES
and straw sips  Goal 3: Pt will participate in MBSS as appropriate. Will plan for pt to follow-up for study as outpatient  Goal 4: Pt/caregivers will indicate understanding of all recommendations.  Meeting, continue      EDUCATION: as above    PAIN RATING (0-10 Scale): 0/denies  Time in/Time out: SLP Individual Minutes  Time In: 5976  Time Out: 8014  Minutes: 15    Visit number: [address] [facility] [address] [facility]  8/13/2020  10:58 AM

## 2020-08-13 NOTE — PROGRESS NOTES
Physical Therapy    Physical Therapy Treatment Note  Name: Easton Dominguez MRN: [id number] :   1932   Date:  2020   Admission Date: 2020 Room:  [address] [place], [place] [address]   Restrictions/Precautions:          Communication with other providers:  Per nurse ok to tx and co-tx with EDDY  Subjective:  Patient states:  Pt agreeable to tx but reports feeling more shaky today. Pain:   Location, Type, Intensity (0/10 to 10/10): Pt reports neck and shoulders stiff and sore and bottom sore. Toward end of tx pt stated \"my back is killing me\"   Objective:    Observation:  Alert and oriented to self. Pt was having tremors in UE. pt's  Back also appeared red and mottled  And nurse called to room to check on pt. Treatment, including education/measures:  Sup to sit max assist of 2 with bed rail and HOB up   Sit to sup mod assist of 2 and cues  Max assist of 2 scooting in sup and sitting using bed pad  Pt sat EOB x 20 minutes and was min assist progressing to SBA but needing cues for posture and alinement. Pt has flexed posture and tends to lean to right side. Refer to OT note for ADLs in sitting. Sit<=>stand max assist of 2. Pt had narrow KALE and needing min assist to get feet apart in sitting before transferring to standing. Standing at walker varies between mod assist of 2 to max assist of 2. Pt attempting to wt shift but unable to unweight right foot today. Pt took 2 very small/scooting steps with right foot but needing max assist 2 for support. Safety  Patient left safely in the bed, with call light/phone in reach with alarm applied. Gait belt was used for transfers and gait. Assessment / Impression:       Patient's tolerance of treatment:  fair  Adverse Reaction: na  Significant change in status and impact:  na  Barriers to improvement:  Strength and safety  Plan for Next Session:    Cont.  POC  Time in:  1415  Time out:  1458  Timed treatment minutes:  43  Total treatment time:  37    Previously filed items:  Social/Functional History  Lives With: Son  Type of Home: House  Home Layout: Two level  Home Access: Stairs to enter with rails  Entrance Stairs - Number of Steps: Pt uncertain  Entrance Stairs - Rails: Both  Home Equipment: Cane, Rolling walker  ADL Assistance: Independent  Homemaking Assistance: Independent  Ambulation Assistance: Independent(mod I with cane or RW)  Transfer Assistance: Independent  Active : No(Son drives)  Occupation: Retired  Short term goals  Time Frame for [employer] term goals: 1 week  Short term goal 1: Patient will complete bed mobility Min A  Short term goal 2: Patient will perform STS x5 with LRAD and Mod A. Short term goal 3: Patient will ambulate x6ft with LRAD and Mod A.        Electronically signed by:    Arlette Reid PTA  8/13/2020, 1:06 PM

## 2020-08-13 NOTE — PROGRESS NOTES
Occupational Therapy  . Occupational Therapy Treatment Note  Name: Marie Manley MRN: [id number] :   1932   Date:  2020   Admission Date: 2020 [facility]   Restrictions/Precautions:    General precautions; Fall     Communication with other providers:  Per chart review and Nurse Brendan Conteh, patient is appropriate for therapeutic intervention. Co-Tx c PTA Huber Asencio. Nurse arrived to check pt following notification for redness / mottled appearance to back (nurse reports awareness) and reports plan for change of sacral patch after end of Tx session. Subjective:  Patient states: \"My back is killing me. \" Pt agreeable to Tx session. Pain:   Location, Type, Intensity (0/10 to 10/10):  Pain, did not numerically rate even c questioning. Objective:    Observation:  Pt received in I-ulez-adtqa position c pillow support behind back / L hip. Reddened / mottled areas noted on back which nurse is aware of (see above). Sacral dressing present. Objective Measures:  O2 97% on room air. Treatment, including education:  Therapeutic Activity Training:   Therapeutic activity training was instructed today. Cues were given for safety, sequence, UE/LE placement, awareness, and balance. Activities performed today included bed mobility training, sup-sit, sit-stand, SPT. Supine to sit: Max A x2  Sit to supine: Mod A x2  Scooting: Max A x2 + use of chux pad  Sitting balance / tolerance: SBA + postural cues for sitting midline ~25 minutes EOB. Sit to stand: Max A x1-2 c RW, varied c fatigue, pt required verbal and physical cues for posture and BLE placement. Stand to sit: Varied Mod A / Max A x2  c RW+ cues for reach back, safe body positioning  Standing balance / tolerance: Mod A / Max A x2 c RW x3 stands, all 25-30 seconds. Functional Mobility: Max A x2 c RW for attempts, only to perform meager, incremental movements. See PT note for additional details.    Pt required increased time to perform all functional transfers and for rest breaks between stands. Self Care Training:   Cues were given for safety, sequence, UE/LE placement, visual cues, and balance. Activities performed today included grooming for washing face and hand hygiene while sitting EOB. All therapeutic intervention performed c emphasis on dynamic balance / sitting and standing tolerance to inc strength, endurance and act tolerance for inc Indep c ADL tasks, func transfers / mobility. Safety  Patient safely in bed + alarm set at end of session, with call light/phone in reach, and nursing aware. Gait belt was used for func transfers / mobility. Assessment / Impression:        Patient's tolerance of treatment:  Fair   Adverse Reaction: None  Significant change in status and impact:  Appears to have decreased activity tolerance this Tx session vs yesterday. Barriers to improvement:  Pain, decreased strength, decreased balance, decreased endurance    Plan for Next Session:    Continue per OT POC c plan to address ADLs. Time in:  1415  Time out:  1458  Timed treatment minutes:  43  Total treatment time:  43    Electronically signed by:    CHRISS Vu  8/13/2020, 3:01 PM    Previously filed values:    Goals:  1. Pt will complete all aspects of bed mobility for EOB/OOB ADLs min A  2. Pt will complete UB/LB bathing mod A with setup using long handled sponge PRN  3. Pt will complete all aspects of LB dressing mod A with setup using AE PRN  4. Pt will complete all functional transfers to and from bed, chair, toilet, shower chair min A/good safety awareness  5. Pt will ambulate HH distance to bathroom for toileting mod A x 2 with RW  6. Pt will complete all aspects of toileting task mod A on BSC/regular toilet  7. Pt will complete oral hygiene/grooming routine in unsupported sitting EOB SBA with setup  8.  Pt will complete ther ex/ther act with focus on UE strengthening, functional standing tolerance >5 minutes, dynamic sitting balance/neuro re-ed tasks

## 2020-08-13 NOTE — PROGRESS NOTES
Pulmonary and Critical Care  Progress Note    Subjective: The patient is improving. On RA. Shortness of breath none. Chest pain none. Addressing respiratory complaints Patient is negative for  hemoptysis and cyanosis  CONSTITUTIONAL:  negative for fevers and chills      Past Medical History:     has a past medical history of Anxiety, Depression, Hypertension, and Nerves. has a past surgical history that includes joint replacement and Upper gastrointestinal endoscopy (N/A, 8/12/2020). reports that he quit smoking about 45 years ago. His smokeless tobacco use includes chew. He reports that he does not drink alcohol or use drugs. Family history:  family history includes Cancer in his father, mother, and sister. No Known Allergies  Social History:    Reviewed; no changes    Objective:   PHYSICAL EXAM:        VITALS:  /77   Pulse 66   Temp 98.1 °F (36.7 °C) (Oral)   Resp 15   Ht 5' 8\" (1.727 m)   Wt 196 lb (88.9 kg)   SpO2 91%   BMI 29.80 kg/m²     24HR INTAKE/OUTPUT:      Intake/Output Summary (Last 24 hours) at 8/13/2020 1109  Last data filed at 8/13/2020 0229  Gross per 24 hour   Intake 270 ml   Output 1250 ml   Net -980 ml       CONSTITUTIONAL:  Awake. LUNGS:  decreased breath sounds, occ basilar crackles. CARDIOVASCULAR:  normal S1 and S2 and negative JVD  ABD:Abdomen soft, non-tender. BS normal. No masses,  No organomegaly  NEURO:Alert. DATA:    CBC:  Recent Labs     08/11/20  0121   WBC 10.5   RBC 4.18*   HGB 11.6*   HCT 38.2*      MCV 91.4   MCH 27.8   MCHC 30.4*   RDW 14.6      BMP:  Recent Labs     08/11/20  0121 08/12/20  2213   * 136   K 5.2* 5.2*   CL 98* 100   CO2 26 26   BUN 34* 38*   CREATININE 1.4* 1.6*   CALCIUM 8.3 8.5   GLUCOSE 135* 137*      ABG:  No results for input(s): PH, PO2ART, RBR7VTO, HCO3, BEART, O2SAT in the last 72 hours.   Lab Results   Component Value Date    PROBNP 1,687 (H) 08/05/2020    PROBNP 1,904 (H) 08/04/2020    PROBNP 542.4 (H) 08/02/2020 No results found for: [facility]    Radiology Review:  Pertinent images / reports were reviewed as a part of this visit. Assessment:     Patient Active Problem List   Diagnosis    Pneumonia due to organism    [facility]    Essential hypertension, benign    Depressive disorder, not elsewhere classified    Acute [facility]    Metabolic encephalopathy    SOB (shortness of breath)    Elevated liver enzymes       Plan:   1. Overall the patient has improved. 2. Inc. Activity.     Kaitlin Shrestha MD  8/13/2020  11:09 AM

## 2020-08-14 LAB
EKG ATRIAL RATE: 88 BPM
EKG DIAGNOSIS: NORMAL
EKG P AXIS: 39 DEGREES
EKG P-R INTERVAL: 174 MS
EKG Q-T INTERVAL: 366 MS
EKG QRS DURATION: 134 MS
EKG QTC CALCULATION (BAZETT): 442 MS
EKG R AXIS: -67 DEGREES
EKG T AXIS: 38 DEGREES
EKG VENTRICULAR RATE: 88 BPM

## 2020-08-15 LAB — CA 19-9: 34 U/ML (ref 0–37)

## 2020-08-21 ENCOUNTER — HOSPITAL ENCOUNTER (INPATIENT)
Age: 85
LOS: 29 days | Discharge: NURSING, REHAB OR LONG-TERM CARE | DRG: 853 | End: 2020-09-19
Attending: EMERGENCY MEDICINE | Admitting: INTERNAL MEDICINE
Payer: MEDICARE

## 2020-08-21 ENCOUNTER — APPOINTMENT (OUTPATIENT)
Dept: NUCLEAR MEDICINE | Age: 85
DRG: 853 | End: 2020-08-21
Payer: MEDICARE

## 2020-08-21 ENCOUNTER — APPOINTMENT (OUTPATIENT)
Dept: GENERAL RADIOLOGY | Age: 85
DRG: 853 | End: 2020-08-21
Payer: MEDICARE

## 2020-08-21 PROBLEM — J96.00 ACUTE RESPIRATORY FAILURE (HCC): Status: ACTIVE | Noted: 2020-08-21

## 2020-08-21 LAB
ALBUMIN SERPL-MCNC: 3.1 GM/DL (ref 3.4–5)
ALP BLD-CCNC: 278 IU/L (ref 40–128)
ALT SERPL-CCNC: 181 U/L (ref 10–40)
ANION GAP SERPL CALCULATED.3IONS-SCNC: 11 MMOL/L (ref 4–16)
AST SERPL-CCNC: 160 IU/L (ref 15–37)
BACTERIA: NEGATIVE /HPF
BASE EXCESS: 2 (ref 0–3.3)
BASOPHILS ABSOLUTE: 0 K/CU MM
BASOPHILS RELATIVE PERCENT: 0.3 % (ref 0–1)
BILIRUB SERPL-MCNC: 1 MG/DL (ref 0–1)
BILIRUBIN URINE: NEGATIVE MG/DL
BLOOD, URINE: NEGATIVE
BUN BLDV-MCNC: 59 MG/DL (ref 6–23)
CALCIUM SERPL-MCNC: 9.1 MG/DL (ref 8.3–10.6)
CHLORIDE BLD-SCNC: 100 MMOL/L (ref 99–110)
CLARITY: ABNORMAL
CO2: 25 MMOL/L (ref 21–32)
COLOR: ABNORMAL
COMMENT: ABNORMAL
CREAT SERPL-MCNC: 1.9 MG/DL (ref 0.9–1.3)
D DIMER: 1424 NG/ML(DDU)
DIFFERENTIAL TYPE: ABNORMAL
EOSINOPHILS ABSOLUTE: 0.1 K/CU MM
EOSINOPHILS RELATIVE PERCENT: 1 % (ref 0–3)
FIBRINOGEN LEVEL: 806 MG/DL (ref 196.9–442.1)
GFR AFRICAN AMERICAN: 41 ML/MIN/1.73M2
GFR NON-AFRICAN AMERICAN: 34 ML/MIN/1.73M2
GLUCOSE BLD-MCNC: 127 MG/DL (ref 70–99)
GLUCOSE, URINE: NEGATIVE MG/DL
HCO3 VENOUS: 23 MMOL/L (ref 19–25)
HCT VFR BLD CALC: 32.2 % (ref 42–52)
HEMOGLOBIN: 10.1 GM/DL (ref 13.5–18)
IMMATURE NEUTROPHIL %: 0.4 % (ref 0–0.43)
KETONES, URINE: NEGATIVE MG/DL
LACTATE DEHYDROGENASE: 255 IU/L (ref 120–246)
LACTATE: 1.4 MMOL/L (ref 0.4–2)
LEUKOCYTE ESTERASE, URINE: NEGATIVE
LV EF: 53 %
LVEF MODALITY: NORMAL
LYMPHOCYTES ABSOLUTE: 0.9 K/CU MM
LYMPHOCYTES RELATIVE PERCENT: 8.7 % (ref 24–44)
MCH RBC QN AUTO: 27.9 PG (ref 27–31)
MCHC RBC AUTO-ENTMCNC: 31.4 % (ref 32–36)
MCV RBC AUTO: 89 FL (ref 78–100)
MONOCYTES ABSOLUTE: 1.1 K/CU MM
MONOCYTES RELATIVE PERCENT: 10.1 % (ref 0–4)
MUCUS: ABNORMAL HPF
NITRITE URINE, QUANTITATIVE: NEGATIVE
NUCLEATED RBC %: 0 %
O2 SAT, VEN: 87.6 % (ref 50–70)
PCO2, VEN: 38 MMHG (ref 38–52)
PDW BLD-RTO: 14.3 % (ref 11.7–14.9)
PH VENOUS: 7.39 (ref 7.32–7.42)
PH, URINE: 5 (ref 5–8)
PLATELET # BLD: 288 K/CU MM (ref 140–440)
PMV BLD AUTO: 9.6 FL (ref 7.5–11.1)
PO2, VEN: 87 MMHG (ref 28–48)
POTASSIUM SERPL-SCNC: 4.4 MMOL/L (ref 3.5–5.1)
PRO-BNP: 652.3 PG/ML
PROCALCITONIN: 0.32
PROTEIN UA: NEGATIVE MG/DL
RBC # BLD: 3.62 M/CU MM (ref 4.6–6.2)
RBC URINE: ABNORMAL /HPF (ref 0–3)
SEGMENTED NEUTROPHILS ABSOLUTE COUNT: 8.5 K/CU MM
SEGMENTED NEUTROPHILS RELATIVE PERCENT: 79.5 % (ref 36–66)
SODIUM BLD-SCNC: 136 MMOL/L (ref 135–145)
SPECIFIC GRAVITY UA: 1.02 (ref 1–1.03)
SQUAMOUS EPITHELIAL: <1 /HPF
TOTAL IMMATURE NEUTOROPHIL: 0.04 K/CU MM
TOTAL NUCLEATED RBC: 0 K/CU MM
TOTAL PROTEIN: 7.3 GM/DL (ref 6.4–8.2)
TRANSITIONAL EPITHELIAL: <1 /HPF
TRICHOMONAS: ABNORMAL /HPF
UROBILINOGEN, URINE: 2 MG/DL (ref 0.2–1)
WBC # BLD: 10.6 K/CU MM (ref 4–10.5)
WBC UA: 1 /HPF (ref 0–2)

## 2020-08-21 PROCEDURE — 2580000003 HC RX 258: Performed by: EMERGENCY MEDICINE

## 2020-08-21 PROCEDURE — 6370000000 HC RX 637 (ALT 250 FOR IP): Performed by: EMERGENCY MEDICINE

## 2020-08-21 PROCEDURE — 85025 COMPLETE CBC W/AUTO DIFF WBC: CPT

## 2020-08-21 PROCEDURE — 6360000002 HC RX W HCPCS: Performed by: INTERNAL MEDICINE

## 2020-08-21 PROCEDURE — 83880 ASSAY OF NATRIURETIC PEPTIDE: CPT

## 2020-08-21 PROCEDURE — 80053 COMPREHEN METABOLIC PANEL: CPT

## 2020-08-21 PROCEDURE — 84145 PROCALCITONIN (PCT): CPT

## 2020-08-21 PROCEDURE — 83605 ASSAY OF LACTIC ACID: CPT

## 2020-08-21 PROCEDURE — 2580000003 HC RX 258: Performed by: INTERNAL MEDICINE

## 2020-08-21 PROCEDURE — 85379 FIBRIN DEGRADATION QUANT: CPT

## 2020-08-21 PROCEDURE — A9540 TC99M MAA: HCPCS | Performed by: INTERNAL MEDICINE

## 2020-08-21 PROCEDURE — 6370000000 HC RX 637 (ALT 250 FOR IP): Performed by: INTERNAL MEDICINE

## 2020-08-21 PROCEDURE — 71045 X-RAY EXAM CHEST 1 VIEW: CPT

## 2020-08-21 PROCEDURE — 85384 FIBRINOGEN ACTIVITY: CPT

## 2020-08-21 PROCEDURE — 82805 BLOOD GASES W/O2 SATURATION: CPT

## 2020-08-21 PROCEDURE — U0002 COVID-19 LAB TEST NON-CDC: HCPCS

## 2020-08-21 PROCEDURE — 93306 TTE W/DOPPLER COMPLETE: CPT

## 2020-08-21 PROCEDURE — 78580 LUNG PERFUSION IMAGING: CPT

## 2020-08-21 PROCEDURE — 6360000002 HC RX W HCPCS: Performed by: EMERGENCY MEDICINE

## 2020-08-21 PROCEDURE — 87040 BLOOD CULTURE FOR BACTERIA: CPT

## 2020-08-21 PROCEDURE — 2700000000 HC OXYGEN THERAPY PER DAY

## 2020-08-21 PROCEDURE — 87081 CULTURE SCREEN ONLY: CPT

## 2020-08-21 PROCEDURE — 99291 CRITICAL CARE FIRST HOUR: CPT

## 2020-08-21 PROCEDURE — 94761 N-INVAS EAR/PLS OXIMETRY MLT: CPT

## 2020-08-21 PROCEDURE — 3430000000 HC RX DIAGNOSTIC RADIOPHARMACEUTICAL: Performed by: INTERNAL MEDICINE

## 2020-08-21 PROCEDURE — 99213 OFFICE O/P EST LOW 20 MIN: CPT

## 2020-08-21 PROCEDURE — 87086 URINE CULTURE/COLONY COUNT: CPT

## 2020-08-21 PROCEDURE — 81001 URINALYSIS AUTO W/SCOPE: CPT

## 2020-08-21 PROCEDURE — 83615 LACTATE (LD) (LDH) ENZYME: CPT

## 2020-08-21 PROCEDURE — 1200000000 HC SEMI PRIVATE

## 2020-08-21 RX ORDER — SODIUM CHLORIDE 9 MG/ML
INJECTION, SOLUTION INTRAVENOUS CONTINUOUS
Status: DISCONTINUED | OUTPATIENT
Start: 2020-08-21 | End: 2020-08-23

## 2020-08-21 RX ORDER — ACETAMINOPHEN 650 MG/1
650 SUPPOSITORY RECTAL EVERY 6 HOURS PRN
Status: DISCONTINUED | OUTPATIENT
Start: 2020-08-21 | End: 2020-09-19 | Stop reason: HOSPADM

## 2020-08-21 RX ORDER — MIRTAZAPINE 15 MG/1
45 TABLET, FILM COATED ORAL NIGHTLY
Status: DISCONTINUED | OUTPATIENT
Start: 2020-08-21 | End: 2020-09-19 | Stop reason: HOSPADM

## 2020-08-21 RX ORDER — ACETAMINOPHEN 650 MG/1
650 SUPPOSITORY RECTAL ONCE
Status: COMPLETED | OUTPATIENT
Start: 2020-08-21 | End: 2020-08-21

## 2020-08-21 RX ORDER — SODIUM CHLORIDE 0.9 % (FLUSH) 0.9 %
10 SYRINGE (ML) INJECTION EVERY 12 HOURS SCHEDULED
Status: DISCONTINUED | OUTPATIENT
Start: 2020-08-21 | End: 2020-09-19 | Stop reason: HOSPADM

## 2020-08-21 RX ORDER — PROMETHAZINE HYDROCHLORIDE 25 MG/1
12.5 TABLET ORAL EVERY 6 HOURS PRN
Status: DISCONTINUED | OUTPATIENT
Start: 2020-08-21 | End: 2020-09-19 | Stop reason: HOSPADM

## 2020-08-21 RX ORDER — METOPROLOL SUCCINATE 25 MG/1
25 TABLET, EXTENDED RELEASE ORAL DAILY
Status: DISCONTINUED | OUTPATIENT
Start: 2020-08-21 | End: 2020-09-19 | Stop reason: HOSPADM

## 2020-08-21 RX ORDER — HEPARIN SODIUM 5000 [USP'U]/ML
5000 INJECTION, SOLUTION INTRAVENOUS; SUBCUTANEOUS EVERY 8 HOURS SCHEDULED
Status: DISCONTINUED | OUTPATIENT
Start: 2020-08-21 | End: 2020-08-31

## 2020-08-21 RX ORDER — SODIUM CHLORIDE 0.9 % (FLUSH) 0.9 %
10 SYRINGE (ML) INJECTION PRN
Status: DISCONTINUED | OUTPATIENT
Start: 2020-08-21 | End: 2020-09-19 | Stop reason: HOSPADM

## 2020-08-21 RX ORDER — 0.9 % SODIUM CHLORIDE 0.9 %
1000 INTRAVENOUS SOLUTION INTRAVENOUS ONCE
Status: COMPLETED | OUTPATIENT
Start: 2020-08-21 | End: 2020-08-21

## 2020-08-21 RX ORDER — ONDANSETRON 2 MG/ML
4 INJECTION INTRAMUSCULAR; INTRAVENOUS EVERY 6 HOURS PRN
Status: DISCONTINUED | OUTPATIENT
Start: 2020-08-21 | End: 2020-09-19 | Stop reason: HOSPADM

## 2020-08-21 RX ORDER — PANTOPRAZOLE SODIUM 40 MG/1
40 TABLET, DELAYED RELEASE ORAL
Status: DISCONTINUED | OUTPATIENT
Start: 2020-08-21 | End: 2020-09-12

## 2020-08-21 RX ORDER — QUETIAPINE FUMARATE 100 MG/1
100 TABLET, FILM COATED ORAL 2 TIMES DAILY
Status: DISCONTINUED | OUTPATIENT
Start: 2020-08-21 | End: 2020-09-19 | Stop reason: HOSPADM

## 2020-08-21 RX ORDER — POLYETHYLENE GLYCOL 3350 17 G/17G
17 POWDER, FOR SOLUTION ORAL DAILY PRN
Status: DISCONTINUED | OUTPATIENT
Start: 2020-08-21 | End: 2020-09-19 | Stop reason: HOSPADM

## 2020-08-21 RX ORDER — ACETAMINOPHEN 325 MG/1
650 TABLET ORAL ONCE
Status: DISCONTINUED | OUTPATIENT
Start: 2020-08-21 | End: 2020-08-21

## 2020-08-21 RX ORDER — ACETAMINOPHEN 325 MG/1
650 TABLET ORAL EVERY 6 HOURS PRN
Status: DISCONTINUED | OUTPATIENT
Start: 2020-08-21 | End: 2020-09-19 | Stop reason: HOSPADM

## 2020-08-21 RX ADMIN — COLLAGENASE SANTYL: 250 OINTMENT TOPICAL at 22:45

## 2020-08-21 RX ADMIN — SODIUM CHLORIDE 1000 ML: 9 INJECTION, SOLUTION INTRAVENOUS at 03:08

## 2020-08-21 RX ADMIN — ACETAMINOPHEN 650 MG: 325 TABLET ORAL at 15:46

## 2020-08-21 RX ADMIN — COLLAGENASE SANTYL: 250 OINTMENT TOPICAL at 13:00

## 2020-08-21 RX ADMIN — HEPARIN SODIUM 5000 UNITS: 5000 INJECTION, SOLUTION INTRAVENOUS; SUBCUTANEOUS at 05:23

## 2020-08-21 RX ADMIN — MIRTAZAPINE 45 MG: 15 TABLET, FILM COATED ORAL at 22:45

## 2020-08-21 RX ADMIN — HEPARIN SODIUM 5000 UNITS: 5000 INJECTION, SOLUTION INTRAVENOUS; SUBCUTANEOUS at 22:42

## 2020-08-21 RX ADMIN — QUETIAPINE FUMARATE 100 MG: 100 TABLET ORAL at 10:37

## 2020-08-21 RX ADMIN — SODIUM CHLORIDE: 9 INJECTION, SOLUTION INTRAVENOUS at 01:44

## 2020-08-21 RX ADMIN — QUETIAPINE FUMARATE 100 MG: 100 TABLET ORAL at 22:45

## 2020-08-21 RX ADMIN — PANTOPRAZOLE SODIUM 40 MG: 40 TABLET, DELAYED RELEASE ORAL at 15:46

## 2020-08-21 RX ADMIN — CEFEPIME HYDROCHLORIDE 2 G: 2 INJECTION, POWDER, FOR SOLUTION INTRAVENOUS at 02:24

## 2020-08-21 RX ADMIN — VANCOMYCIN HYDROCHLORIDE 1750 MG: 5 INJECTION, POWDER, LYOPHILIZED, FOR SOLUTION INTRAVENOUS at 03:15

## 2020-08-21 RX ADMIN — CEFEPIME HYDROCHLORIDE 1 G: 1 INJECTION, POWDER, FOR SOLUTION INTRAMUSCULAR; INTRAVENOUS at 15:46

## 2020-08-21 RX ADMIN — HEPARIN SODIUM 5000 UNITS: 5000 INJECTION, SOLUTION INTRAVENOUS; SUBCUTANEOUS at 15:46

## 2020-08-21 RX ADMIN — Medication 6 MILLICURIE: at 13:00

## 2020-08-21 RX ADMIN — SODIUM CHLORIDE: 9 INJECTION, SOLUTION INTRAVENOUS at 15:47

## 2020-08-21 RX ADMIN — ACETAMINOPHEN 650 MG: 650 SUPPOSITORY RECTAL at 01:55

## 2020-08-21 ASSESSMENT — PAIN SCALES - WONG BAKER: WONGBAKER_NUMERICALRESPONSE: 0

## 2020-08-21 ASSESSMENT — PAIN SCALES - GENERAL
PAINLEVEL_OUTOF10: 0

## 2020-08-21 NOTE — PROGRESS NOTES
Comprehensive Nutrition Assessment    Type and Reason for Visit:  Initial, Wound    Nutrition Recommendations/Plan:   · Continue current diet  · Start wound healing supplements    Nutrition Assessment:  Pt fell out due to a positive screen for wounds. Pt has several unstagable wounds. Will order wound healing supplements and continue to follow. Malnutrition Assessment:  Malnutrition Status:  Insufficient data    Context:  Social/Environmental Circumstances       Estimated Daily Nutrient Needs:  Energy (kcal):  1392-7940; Weight Used for Energy Requirements:  Current     Protein (g):  105; Weight Used for Protein Requirements:  Ideal        Fluid (ml/day):  6095-4084; Weight Used for Fluid Requirements:  Current      Nutrition Related Findings:  None      Wounds:  Multiple, Pressure Ulcer, Unstageable, Stage II       Current Nutrition Therapies:    DIET CARDIAC; Anthropometric Measures:  · Height: 5' 8\" (172.7 cm)  · Current Body Weight: 195 lb (88.5 kg)   · Usual Body Weight: 196 lb (88.9 kg)     · Ideal Body Weight: 154 lbs; % Ideal Body Weight 126.6 %   · BMI: 29.7   · BMI Categories: Overweight (BMI 25.0-29. 9)       Nutrition Diagnosis:   · Inadequate energy intake related to acute injury/trauma as evidenced by wounds      Nutrition Interventions:   Food and/or Nutrient Delivery:  Continue Current Diet, Start Oral Nutrition Supplement  Nutrition Education/Counseling:  No recommendation at this time   Coordination of Nutrition Care:  Continued Inpatient Monitoring    Goals:  pt will consume greater than 75% of his meals and supplements       Nutrition Monitoring and Evaluation:   Food/Nutrient Intake Outcomes:  Food and Nutrient Intake, Supplement Intake  Physical Signs/Symptoms Outcomes:  Biochemical Data, Skin, Weight     Discharge Planning:     Too soon to determine     Electronically signed by Luis Fernando Claros RD, LD on 9/49/86 at 2:58 PM EDT    Contact: [phone]

## 2020-08-21 NOTE — CONSULTS
[facility] Continence Nurse  Consult Note       Sneha Briscoe  AGE: 80 y.o. GENDER: male  : 1932  TODAY'S DATE:  2020    Subjective:     Reason for  Evaluation and Assessment: wound assessment      Sneha Briscoe is a 80 y.o. male referred by:   [x] Physician  [] Nursing  [] Other:     Wound Identification:  Wound Type: pressure  Contributing Factors: chronic pressure, decreased mobility, decreased tissue oxygenation, malnutrition and incontinence of stool        PAST MEDICAL HISTORY        Diagnosis Date    Anxiety     Depression     Hypertension     Nerves        PAST SURGICAL HISTORY    Past Surgical History:   Procedure Laterality Date    JOINT REPLACEMENT      right hip    UPPER GASTROINTESTINAL ENDOSCOPY N/A 2020    EGD BIOPSY performed by Coretta Coats MD at [facility]    Family History   Problem Relation Age of Onset    Cancer Mother     Cancer Father     Cancer Sister        SOCIAL HISTORY    Social History     Tobacco Use    Smoking status: Former Smoker     Last attempt to quit: 1974     Years since quittin.7    Smokeless tobacco: Current User     Types: Chew   Substance Use Topics    Alcohol use: No     Comment: used to drSharalikek on weekends stopped 2 years ago    Drug use: No       ALLERGIES    No Known Allergies    MEDICATIONS    No current facility-administered medications on file prior to encounter. Current Outpatient Medications on File Prior to Encounter   Medication Sig Dispense Refill    pantoprazole (PROTONIX) 40 MG tablet Take 1 tablet by mouth 2 times daily (before meals) 30 tablet 3    furosemide (LASIX) 40 MG tablet Take 1 tablet by mouth daily for 3 days 3 tablet 0    cloNIDine (CATAPRES) 0.1 MG tablet Take 1 tablet by mouth every 6 hours as needed (for B/P systolic > 786). 60 tablet 3    QUEtiapine (SEROQUEL) 100 MG tablet Take 100 mg by mouth 2 times daily.       metoprolol (TOPROL-XL) 25 MG XL tablet Take 25 mg by mouth daily.  mirtazapine (REMERON) 45 MG tablet Take 45 mg by mouth nightly.              Objective:      /60   Pulse 82   Temp 98.8 °F (37.1 °C) (Oral)   Resp 26   Ht 5' 8\" (1.727 m)   Wt 195 lb (88.5 kg)   SpO2 100%   BMI 29.65 kg/m²   Boone Risk Score: Boone Scale Score: 11    LABS    CBC:   Lab Results   Component Value Date    WBC 10.6 08/21/2020    RBC 3.62 08/21/2020    HGB 10.1 08/21/2020    HCT 32.2 08/21/2020    MCV 89.0 08/21/2020    MCH 27.9 08/21/2020    MCHC 31.4 08/21/2020    RDW 14.3 08/21/2020     08/21/2020    MPV 9.6 08/21/2020     CMP:    Lab Results   Component Value Date     08/21/2020    K 4.4 08/21/2020     08/21/2020    CO2 25 08/21/2020    BUN 59 08/21/2020    CREATININE 1.9 08/21/2020    GFRAA 41 08/21/2020    LABGLOM 34 08/21/2020    GLUCOSE 127 08/21/2020    PROT 7.3 08/21/2020    PROT 6.4 10/24/2012    LABALBU 3.1 08/21/2020    CALCIUM 9.1 08/21/2020    BILITOT 1.0 08/21/2020    ALKPHOS 278 08/21/2020     08/21/2020     08/21/2020     Albumin:    Lab Results   Component Value Date    LABALBU 3.1 08/21/2020     PT/INR:    Lab Results   Component Value Date    PROTIME 13.9 08/04/2020    INR 1.15 08/04/2020     HgBA1c:    Lab Results   Component Value Date    LABA1C 5.3 06/05/2013         Assessment:     Patient Active Problem List   Diagnosis    Pneumonia due to organism    [facility] failure[facility]    Essential hypertension, benign    Depressive disorder, not elsewhere classified    Acute renal failure ([facility]    Metabolic encephalopathy    SOB (shortness of breath)    Elevated liver enzymes    Acute respiratory failure (HCC)       Measurements:  Wound 08/10/20 Sacrum and buttock (Active)   Wound Pressure Unstageable 08/21/20 0920   Dressing Status Changed 08/21/20 0920   Dressing Changed Changed/New 08/21/20 0920   Dressing/Treatment Moist to dry 08/21/20 0920   Wound Cleansed Rinsed/Irrigated with saline 08/21/20 0920   Dressing Change Due 08/13/20 08/13/20 0912   Wound Length (cm) 9.5 cm 08/21/20 0920   Wound Width (cm) 10 cm 08/21/20 0920   Wound Depth (cm) 2.6 cm 08/21/20 0920   Wound Surface Area (cm^2) 95 cm^2 08/21/20 0920   Change in Wound Size % (l*w) -5.56 08/21/20 0920   Wound Volume (cm^3) 247 cm^3 08/21/20 0920   Wound Healing % -2644 08/21/20 0920   Distance Tunneling (cm) 0 cm 08/21/20 0920   Tunneling Position ___ O'Clock 0 08/21/20 0920   Undermining Starts ___ O'Clock 9 08/21/20 0920   Undermining Ends___ O'Clock 2 08/21/20 0920   Undermining Maxium Distance (cm) 2.8 08/21/20 0920   Wound Assessment Keena Ferns; Purple;Red;Slough; Yellow 08/21/20 0920   Drainage Amount Large 08/21/20 0920   Drainage Description Serosanguinous;Brown 08/21/20 0920   Odor Strong 08/21/20 0920   Margins Defined edges 08/21/20 0920   Kecia-wound Assessment Purple 08/21/20 0920   Non-staged Wound Description Full thickness 08/21/20 0920   Pink%Wound Bed 0 08/21/20 0920   Red%Wound Bed 30 08/21/20 0920   Yellow%Wound Bed 30 08/21/20 0920   Black%Wound Bed 0 08/21/20 0920   Purple%Wound Bed 20 08/21/20 0920   Other%Wound Bed 20 brown 08/21/20 0920   Number of days: 11       Wound 08/21/20 Left lateral abdomen (Active)   Wound Pressure Stage  2 08/21/20 0920   Dressing Status Changed 08/21/20 0920   Dressing Changed Changed/New 08/21/20 0920   Dressing/Treatment Silicone border 79/10/78 0920   Wound Cleansed Rinsed/Irrigated with saline 08/21/20 0920   Wound Length (cm) 1.5 cm 08/21/20 0920   Wound Width (cm) 2 cm 08/21/20 0920   Wound Depth (cm) 0.1 cm 08/21/20 0920   Wound Surface Area (cm^2) 3 cm^2 08/21/20 0920   Wound Volume (cm^3) 0.3 cm^3 08/21/20 0920   Distance Tunneling (cm) 0 cm 08/21/20 0920   Tunneling Position ___ O'Clock 0 08/21/20 0920   Undermining Starts ___ O'Clock 0 08/21/20 0920   Undermining Ends___ O'Clock 0 08/21/20 0920   Undermining Maxium Distance (cm) 0 08/21/20 0920   Wound Assessment Pink;Red 08/21/20 0920   Drainage Amount Small 08/21/20 0920   Drainage Description Serosanguinous 08/21/20 0920   Odor None 08/21/20 0920   Margins Defined edges 08/21/20 0920   Kecia-wound Assessment Intact 08/21/20 0920   Non-staged Wound Description Full thickness 08/21/20 0920   Pink%Wound Bed 50 08/21/20 0920   Red%Wound Bed 50 08/21/20 0920   Yellow%Wound Bed 0 08/21/20 0920   Black%Wound Bed 0 08/21/20 0920   Purple%Wound Bed 0 08/21/20 0920   Other%Wound Bed 0 08/21/20 0920   Number of days: 0       Response to treatment:  With complaints of pain. Pain Assessment:  Severity:  mild  Quality of pain: sharp  Wound Pain Timing/Severity: intermittent  Premedicated: no    Plan:     Plan of Care: Wound 08/10/20 Sacrum and buttock-Dressing/Treatment: Moist to dry(abd tape)  Wound 08/21/20 Left lateral abdomen-Dressing/Treatment: Silicone border     Pt in bed. Agreeable to wound assessment. Last seen on 8/10/20 and has been at [address] [place] [address] recently. Wound to sacrum/buttock unstageable with communicating DTI. Wound worsened since last assessment. Dr. Jefferson Lee saw wound last admission. Stage 2 noted to left lateral abdomen. Pictures and measurements taken of wounds. Treatment as above. Recommend Santyl and surgeon consult. Relayed to Dr. Renee Brown. Agreeable. Heels pink, blanching, and intact. Repositioned to right side with heels floated. Spoke with nurse. Pt is at high risk for skin breakdown AEB Boone. Follow Boone orders. Specialty Bed Required : yes  [x] Low Air Loss   [x] Pressure Redistribution  [] Fluid Immersion  [] Bariatric  [] Total Pressure Relief  [] Other:     Discharge Plan:  Placement for patient upon discharge: tbd  Hospice Care: no  Patient appropriate for Outpatient [address]: yes    Patient/Caregiver Teaching:  Level of patient/caregiver understanding able to:   Needs reinforcement.         Electronically signed by Erick Sagastume RN,  on 8/21/2020 at 11:59 AM

## 2020-08-21 NOTE — ED TRIAGE NOTES
Pt from [facility], being treated with IV antibiotics for pneumonia. Pt Desat to 78% room air. Placed on 2L nasal canula at [facility] got to 92%. Pt febrile 101.5 at [facility].

## 2020-08-21 NOTE — PROGRESS NOTES
[address] [facility]  consulted by Dr. Calli Garg for monitoring and adjustment. Indication for treatment: Pneumonia, sepsis, decubitus ulcer  Goal trough: 15 mcg/mL     Pertinent Laboratory Values:   Temp Readings from Last 3 Encounters:   08/21/20 98.6 °F (37 °C) (Oral)   08/13/20 98.6 °F (37 °C) (Oral)     Recent Labs     08/21/20  0135   WBC 10.6*   LACTATE 1.4     Recent Labs     08/21/20  0135   BUN 59*   CREATININE 1.9*     Estimated Creatinine Clearance: 30 mL/min (A) (based on SCr of 1.9 mg/dL (H)). No intake or output data in the 24 hours ending 08/21/20 0526    Pertinent Cultures:  Date    Source    Results  8/21   Urine    Pending  8/21   Covid-19   Pending  8/21   Resp panel   Ordered  8/21   Blood    Ordered    Vancomycin level:   TROUGH:  No results for input(s): VANCOTROUGH in the last 72 hours. RANDOM:  No results for input(s): VANCORANDOM in the last 72 hours. Assessment:  WBC and temperature: elevated WBC, afebrile  SCr, BUN, and urine output: RONALDO, Scr = 1.9  Day(s) of therapy:1  Vancomycin level: To be collected    Plan:  Received vancomycin 1750 mg x 1 in the ED  Intermittent dosing based on levels due to RONALDO  Pharmacy will continue to monitor patient and adjust therapy as indicated    RANDOM VANCOMYCIN LEVEL SCHEDULED FOR 8/22 @0600    Thank you for the consult.   Enid Kraft RPh  8/21/2020 5:26 AM

## 2020-08-21 NOTE — PROGRESS NOTES
Patient arrived to room around 0430. Patient was transferred to bed and hooked up to monitor. Patient's vital signs were taken. Two person skin assessment completed with Izabela DORANTES. Large open non-stageable wound on coccyx noted. Wound consulted. Call light given. Will continue to monitor.

## 2020-08-21 NOTE — PROGRESS NOTES
Patient seen, does not seem to be in any form of distress, denies any difficulty breathing  Saturating in the 90s on about 3 to 4 L nasal cannula  Continue present management

## 2020-08-21 NOTE — ED NOTES
XR CHEST PORTABLE   Status: Final result    Order Providers     Authorizing  Billing    MD Brittni Mata MD            Signed by     Signed  Date/Time   Phone  Pager    Gentry Haiderharjinder  8/21/2020  02:20  [phone]     Reading Providers     Read  Date  Phone  Pager    Gentrygeri Umanzor  Aug 21, 2020  [phone]     Radiation Dose Estimates     No radiation information found for this patient    Narrative    EXAMINATION:    ONE XRAY VIEW OF THE CHEST         8/21/2020 1:29 am         COMPARISON:    Chest single view August 8, 2020         HISTORY:    ORDERING SYSTEM PROVIDED HISTORY: hypoxia    TECHNOLOGIST PROVIDED HISTORY:    Reason for exam:->hypoxia    Reason for Exam: hypoxia    Acuity: Acute    Type of Exam: Initial    Mechanism of Injury: hypoxia    Relevant Medical/Surgical History: hypoxia         FINDINGS:    The heart is normal in size and configuration.  The mediastinal contours are    within normal limits.         Bibasilar mild subsegmental atelectasis is present.  Lungs are otherwise well    aerated.  The pleural surfaces are normal and no evidence of a pleural    effusion is seen.         Bones and soft tissues are unremarkable.              Impression    Mild bibasilar subsegmental atelectasis.         Otherwise, unremarkable single upright portable AP view of the chest.           Albert Sofia RN  08/21/20 [id number]

## 2020-08-21 NOTE — ED PROVIDER NOTES
Triage Chief Complaint:   Illness (Hypoxic at 78%, 2L started by [facility] 92%. Temp 101.5. Pnemonia getting treated by IV antibiotics.)    [place]:  Amelia Davila is a 80 y.o. male that presents via EMS from nursing facility for hypoxia. Patient recently diagnosed with pneumonia and was started on Levaquin yesterday. Reportedly started having oxygen saturations in the 70s on room air. He does not usually require oxygen. He is currently on 3 L nasal cannula. Patient unable to contribute to history and it appears that he is a poor historian at baseline going back to prior notes. He reportedly had a temperature of 101°F prior to arrival here. ROS:  Unable to fully obtain.     Past Medical History:   Diagnosis Date    Anxiety     Depression     Hypertension     Nerves      Past Surgical History:   Procedure Laterality Date    JOINT REPLACEMENT      right hip    UPPER GASTROINTESTINAL ENDOSCOPY N/A 2020    EGD BIOPSY performed by Venessa Bryan MD at [facility]     Family History   Problem Relation Age of Onset    Cancer Mother     Cancer Father     Cancer Sister      Social History     Socioeconomic History    Marital status:      Spouse name: Not on file    Number of children: 3    Years of education: Not on file    Highest education level: Not on file   Occupational History    Not on file   Social Needs    Financial resource strain: Not on file    Food insecurity     Worry: Not on file     Inability: Not on file    Transportation needs     Medical: Not on file     Non-medical: Not on file   Tobacco Use    Smoking status: Former Smoker     Last attempt to quit: 1974     Years since quittin.7    Smokeless tobacco: Current User     Types: Chew   Substance and Sexual Activity    Alcohol use: No     Comment: used to drimk on weekends stopped 2 years ago    Drug use: No    Sexual activity: Not on file   Lifestyle    Physical activity     Days per week: Not on file Minutes per session: Not on file    Stress: Not on file   Relationships    Social connections     Talks on phone: Not on file     Gets together: Not on file     Attends [employer] service: Not on file     Active member of club or organization: Not on file     Attends meetings of clubs or organizations: Not on file     Relationship status: Not on file    Intimate partner violence     Fear of current or ex partner: Not on file     Emotionally abused: Not on file     Physically abused: Not on file     Forced sexual activity: Not on file   Other Topics Concern    Not on file   Social History Narrative    Not on file     Current Facility-Administered Medications   Medication Dose Route Frequency Provider Last Rate Last Dose    0.9 % sodium chloride infusion   Intravenous Continuous James Lozano  mL/hr at 08/21/20 0144      vancomycin (VANCOCIN) 1,750 mg in dextrose 5 % 500 mL IVPB  1,750 mg Intravenous Once James Lozano MD        cefepime (MAXIPIME) 2 g IVPB minibag  2 g Intravenous Once James Lozano  mL/hr at 08/21/20 0224 2 g at 08/21/20 0224     Current Outpatient Medications   Medication Sig Dispense Refill    pantoprazole (PROTONIX) 40 MG tablet Take 1 tablet by mouth 2 times daily (before meals) 30 tablet 3    furosemide (LASIX) 40 MG tablet Take 1 tablet by mouth daily for 3 days 3 tablet 0    cloNIDine (CATAPRES) 0.1 MG tablet Take 1 tablet by mouth every 6 hours as needed (for B/P systolic > 672). 60 tablet 3    QUEtiapine (SEROQUEL) 100 MG tablet Take 100 mg by mouth 2 times daily.  metoprolol (TOPROL-XL) 25 MG XL tablet Take 25 mg by mouth daily.  mirtazapine (REMERON) 45 MG tablet Take 45 mg by mouth nightly.          No Known Allergies    Nursing Notes Reviewed    Physical Exam:  ED Triage Vitals   Enc Vitals Group      BP 08/21/20 0122 120/74      Pulse 08/21/20 0118 104      Resp 08/21/20 0118 22      Temp 08/21/20 0118 100.2 °F (37.9 °C)      Temp Source 08/21/20 0118 Oral SpO2 08/21/20 0118 96 %      Weight 08/21/20 0118 196 lb (88.9 kg)      Height 08/21/20 0118 5' 8\" (1.727 m)      Head Circumference --       Peak Flow --       Pain Score --       Pain Loc --       Pain Edu? --       Excl. in GC? --      GENERAL APPEARANCE: Awake . Alert to self. No acute distress. HEAD: Normocephalic. Atraumatic. EYES: EOM's grossly intact. Sclera anicteric. ENT: Mucous membranes are tacky. Tolerates saliva. No trismus. NECK: No meningismus. HEART:  Extremities pink  LUNGS: Respirations unlabored. Even chest rise bilaterally  ABDOMEN: Non distended. EXTREMITIES: No acute deformities. SKIN: Dry  NEUROLOGICAL: No gross facial drooping. Moves all 4 extremities spontaneously.     I have reviewed and interpreted all of the currently available lab results from this visit (if applicable):  Results for orders placed or performed during the hospital encounter of 08/21/20   CBC Auto Differential   Result Value Ref Range    WBC 10.6 (H) 4.0 - 10.5 K/CU MM    RBC 3.62 (L) 4.6 - 6.2 M/CU MM    Hemoglobin 10.1 (L) 13.5 - 18.0 GM/DL    Hematocrit 32.2 (L) 42 - 52 %    MCV 89.0 78 - 100 FL    MCH 27.9 27 - 31 PG    MCHC 31.4 (L) 32.0 - 36.0 %    RDW 14.3 11.7 - 14.9 %    Platelets 185 667 - 862 K/CU MM    MPV 9.6 7.5 - 11.1 FL    Differential Type AUTOMATED DIFFERENTIAL     Segs Relative 79.5 (H) 36 - 66 %    Lymphocytes % 8.7 (L) 24 - 44 %    Monocytes % 10.1 (H) 0 - 4 %    Eosinophils % 1.0 0 - 3 %    Basophils % 0.3 0 - 1 %    Segs Absolute 8.5 K/CU MM    Lymphocytes Absolute 0.9 K/CU MM    Monocytes Absolute 1.1 K/CU MM    Eosinophils Absolute 0.1 K/CU MM    Basophils Absolute 0.0 K/CU MM    Nucleated RBC % 0.0 %    Total Nucleated RBC 0.0 K/CU MM    Total Immature Neutrophil 0.04 K/CU MM    Immature Neutrophil % 0.4 0 - 0.43 %   CMP   Result Value Ref Range    Sodium 136 135 - 145 MMOL/L    Potassium 4.4 3.5 - 5.1 MMOL/L    Chloride 100 99 - 110 mMol/L    CO2 25 21 - 32 MMOL/L    BUN 59 (H) 6 - 23 MG/DL    CREATININE 1.9 (H) 0.9 - 1.3 MG/DL    Glucose 127 (H) 70 - 99 MG/DL    Calcium 9.1 8.3 - 10.6 MG/DL    Alb 3.1 (L) 3.4 - 5.0 GM/DL    Total Protein 7.3 6.4 - 8.2 GM/DL    Total Bilirubin 1.0 0.0 - 1.0 MG/DL     (H) 10 - 40 U/L     (H) 15 - 37 IU/L    Alkaline Phosphatase 278 (H) 40 - 128 IU/L    GFR Non- 34 (L) >60 mL/min/1.73m2    GFR  41 (L) >60 mL/min/1.73m2    Anion Gap 11 4 - 16   Lactic Acid, Plasma   Result Value Ref Range    Lactate 1.4 0.4 - 2.0 mMOL/L   Blood Gas, Venous   Result Value Ref Range    pH, Joesph 7.39 7.32 - 7.42    pCO2, Joesph 38 38 - 52 mmHG    pO2, Joesph 87 (H) 28 - 48 mmHG    Base Excess 2 0 - 3.3    HCO3, Venous 23.0 19 - 25 MMOL/L    O2 Sat, Joesph 87.6 (H) 50 - 70 %    Comment VBG    Urinalysis   Result Value Ref Range    Color, UA ROSSY (A) YELLOW    Clarity, UA HAZY (A) CLEAR    Glucose, Urine NEGATIVE NEGATIVE MG/DL    Bilirubin Urine NEGATIVE NEGATIVE MG/DL    Ketones, Urine NEGATIVE NEGATIVE MG/DL    Specific Gravity, UA 1.016 1.001 - 1.035    Blood, Urine NEGATIVE NEGATIVE    pH, Urine 5.0 5.0 - 8.0    Protein, UA NEGATIVE NEGATIVE MG/DL    Urobilinogen, Urine 2.0 (H) 0.2 - 1.0 MG/DL    Nitrite Urine, Quantitative NEGATIVE NEGATIVE    Leukocyte Esterase, Urine NEGATIVE NEGATIVE    RBC, UA NONE SEEN 0 - 3 /HPF    WBC, UA 1 0 - 2 /HPF    Bacteria, UA NEGATIVE NEGATIVE /HPF    Squam Epithel, UA <1 /HPF    Trans Epithel, UA <1 /HPF    Mucus, UA RARE (A) NEGATIVE HPF    Trichomonas, UA NONE SEEN NONE SEEN /HPF   Fibrinogen   Result Value Ref Range    Fibrinogen 806 (H) 196.9 - 442.1 MG/DL      Radiographs (if obtained):  [] The following radiograph was interpreted by myself in the absence of a radiologist:  [x] Radiologist's Report Reviewed:    EKG (if obtained): (All EKG's are interpreted by myself in the absence of a cardiologist)    MDM:  Plan of care is discussed thoroughly with the patient and family if present.   If performed, all imaging and lab work also discussed with patient. All relevant prior results and chart reviewed if available. Patient presents as above. He is febrile, tachycardic and hypoxic on arrival.  He is tolerating 3 L nasal cannula without difficulty. He is not in acute respiratory distress. Patient given Tylenol and broad-spectrum antibiotics. Metabolic work-up is largely unremarkable for any acute findings. He has a normal lactate and white blood cell count. COVID swab is sent. Patient does have a large sacral decubital ulcer as well which may be contributing to presentation. This would be covered by his antibiotics. X-ray does not show any obvious consolidative process at this time. He is admitted for further management. I directly delivered medical care to this critically ill patient. Timely evaluation and treatment was necessary to address the significant organ system dysfunction present in this patient. Due to high probability of clinically significant, life-threatening deterioration, the patient required my highest level of preparedness to intervene emergently and I personally spent this critical care time directly and personally managing the patient. I was involved in the stabilization of this critical patient for 37 minutes. During this time I was physically present at the bedside during my initial exam and for re-examinations at intervals coordinating this patient's care with other physicians, examining radiographs, interpreting electrocardiograms and rhythm strips, reviewing laboratory results, discussing the patient's condition and management with the patient/family. Time billed does not include time for procedures. Clinical Impression:  1. Acute febrile illness    2.  Hypoxia      (Please note that portions of this note may have been completed with a voice recognition program. Efforts were made to edit the dictations but occasionally words are mis-transcribed.)    Miranda Valladares MD [facility] James Lozano MD  08/21/20 0724

## 2020-08-21 NOTE — H&P
History and Physical      Name:  Marie Manley /Age/Sex: 1932  (80 y.o. male)   MRN & CSN:  [id number] & [id number] Admission Date/Time: 2020  1:17 AM   Location:  [place] [address] PCP: Scar Farrell MD       Hospital Day: 1    Assessment and Plan:   aMrie Manley is a 80 y.o.  male  who presents with fever and hypoxia     -Acute hypoxic respiratory failure, probable pneumonia, probable CHF  -Fever probably due to pneumonia, probably due to stage III decubitus ulcer  -Acute kidney injury probably due to sepsis  -Chronic kidney disease stage III baseline creatinine 1.4-1.6  -Stage III sacral decubitus ulcer present on admission  -Elevated LFTs chronic, recent ultrasound showed cholelithiasis  -Elevated d-dimer    Plan  IV vancomycin and IV cefepime  Wound care consult  VQ scan  Avoid nephrotoxins  Follow blood cultures  Rule out COVID-19  2D echo    Diet No diet orders on file   DVT Prophylaxis [] Lovenox, []  Heparin, [] SCDs, [] Ambulation   GI Prophylaxis [] PPI,  [] H2 Blocker,  [] Carafate,  [] Diet/Tube Feeds   Code Status Prior   Disposition Patient requires continued admission due to    MDM [] Low, [] Moderate,[]  High  Patient's risk as above due to      History of Present Illness:     Chief Complaint:   Marie Manley is a 80 y.o.  male  who presents with hypoxia and fever. Patient is unable to give history. History was obtained from the chart. Patient presented from skilled nursing facility and for fever and hypoxia. Temperature was 101.5 °F.  Patient's oxygen saturation was in the 70s. Patient has stage III sacral decubitus ulcer on presentation. Patient was recently hospitalized from  through . He had pneumonia, large duodenal ulcer, acute kidney injury and elevated LFTs.            Review of systems unable to be obtained due to patient's altered mental status    Objective:   No intake or output data in the 24 hours ending 20 0232   Vitals:   Vitals: 08/21/20 0200   BP: (!) 104/56   Pulse: 101   Resp: 30   Temp:    SpO2: 98%     Physical Exam:   GEN alert not in distress  EYES Pupils are equally round. No scleral erythema, discharge, or conjunctivitis. HENT Mucous membranes are moist. Oral pharynx without exudates, no evidence of thrush. NECK Supple, no apparent thyromegaly or masses. RESP Clear to auscultation, no wheezes, rales or rhonchi. Symmetric chest movement while on room air. CARDIO/VASC S1/S2 auscultated. Regular rate without appreciable murmurs, rubs, or gallops. No JVD or carotid bruits. Peripheral pulses equal bilaterally and palpable. No peripheral edema. GI Abdomen is soft without significant tenderness, masses, or guarding. Bowel sounds are normoactive. Rectal exam deferred.  No costovertebral angle tenderness. Greene catheter is present. HEME/LYMPH No palpable cervical lymphadenopathy and no hepatosplenomegaly. No petechiae or ecchymoses. MSK No gross joint deformities. SKIN Normal coloration, warm, dry. NEURO Cranial nerves appear grossly intact, normal speech, no lateralizing weakness. PSYCH deferred    Past Medical History:      Past Medical History:   Diagnosis Date    Anxiety     Depression     Hypertension     Nerves      PSHX:  has a past surgical history that includes joint replacement and Upper gastrointestinal endoscopy (N/A, 8/12/2020). Allergies: No Known Allergies    FAM HX: family history includes Cancer in his father, mother, and sister.   Soc HX:   Social History     Socioeconomic History    Marital status:      Spouse name: Not on file    Number of children: 3    Years of education: Not on file    Highest education level: Not on file   Occupational History    Not on file   Social Needs    Financial resource strain: Not on file    Food insecurity     Worry: Not on file     Inability: Not on file    Transportation needs     Medical: Not on file     Non-medical: Not on file   Tobacco Use    Smoking status: Former Smoker     Last attempt to quit: 1974     Years since quittin.7    Smokeless tobacco: Current User     Types: Chew   Substance and Sexual Activity    Alcohol use: No     Comment: used to drimk on weekends stopped 2 years ago    Drug use: No    Sexual activity: Not on file   Lifestyle    Physical activity     Days per week: Not on file     Minutes per session: Not on file    Stress: Not on file   Relationships    Social connections     Talks on phone: Not on file     Gets together: Not on file     Attends Sabianism service: Not on file     Active member of club or organization: Not on file     Attends meetings of clubs or organizations: Not on file     Relationship status: Not on file    Intimate partner violence     Fear of current or ex partner: Not on file     Emotionally abused: Not on file     Physically abused: Not on file     Forced sexual activity: Not on file   Other Topics Concern    Not on file   Social History Narrative    Not on file       Medications:   Medications:    vancomycin  1,750 mg Intravenous Once    cefepime  2 g Intravenous Once      Infusions:    sodium chloride 100 mL/hr at 20 0144     PRN Meds:      Prior to Admission medications    Medication Sig Start Date End Date Taking? Authorizing Provider   pantoprazole (PROTONIX) 40 MG tablet Take 1 tablet by mouth 2 times daily (before meals) 20   Donna Loya MD   furosemide (LASIX) 40 MG tablet Take 1 tablet by mouth daily for 3 days 8/7/20 8/10/20  Gin Alba MD   cloNIDine (CATAPRES) 0.1 MG tablet Take 1 tablet by mouth every 6 hours as needed (for B/P systolic > 303). 12/15/14   Mauricio Menon MD   QUEtiapine (SEROQUEL) 100 MG tablet Take 100 mg by mouth 2 times daily. Historical Provider, MD   metoprolol (TOPROL-XL) 25 MG XL tablet Take 25 mg by mouth daily. Historical Provider, MD   mirtazapine (REMERON) 45 MG tablet Take 45 mg by mouth nightly.       Historical

## 2020-08-21 NOTE — CARE COORDINATION
CM reviewed notes. Pt is from [facility] for [facility] placement. Pt developed fever and hypoxia. Pt has sacral wound. Pt here through the ED. Pt is in Covid unit and and Covid is pendining. Palliative has been consulted and CM will await eval.   Pt has dtr listed in contact Ruben Benz at [phone]. Cm team will continue to follow for discharge planning.

## 2020-08-21 NOTE — ED NOTES
Dr. Bimal Garcia notified of SBP in 90's. No new orders at this time.       Misty Bustos RN  08/21/20 7188

## 2020-08-21 NOTE — PROGRESS NOTES
This RN spoke to patients daughter, Peter Thapa. Peter Thapa states 3 weeks ago he was living alone, doing his own laundry, and care for himself. Peter Alanis also stated mother/ wife passed just 2 months ago. Updated her on patient on pt wound, and status. Peterbilly Thapa is very concerned about the wound on his coccyx.

## 2020-08-21 NOTE — PLAN OF CARE
Problem: Skin Integrity:  Goal: Will show no infection signs and symptoms  Description: Will show no infection signs and symptoms  Outcome: Ongoing  Goal: Absence of new skin breakdown  Description: Absence of new skin breakdown  Outcome: Ongoing     Problem: Airway Clearance - Ineffective  Goal: Achieve or maintain patent airway  Outcome: Ongoing     Problem: Gas Exchange - Impaired  Goal: Absence of hypoxia  Outcome: Ongoing  Goal: Promote optimal lung function  Outcome: Ongoing     Problem: Breathing Pattern - Ineffective  Goal: Ability to achieve and maintain a regular respiratory rate  Outcome: Ongoing     Problem:  Body Temperature -  Risk of, Imbalanced  Goal: Ability to maintain a body temperature within defined limits  Outcome: Ongoing  Goal: Will regain or maintain usual level of consciousness  Outcome: Ongoing  Goal: Complications related to the disease process, condition or treatment will be avoided or minimized  Outcome: Ongoing     Problem: Isolation Precautions - Risk of Spread of Infection  Goal: Prevent transmission of infection  Outcome: Ongoing     Problem: Risk for Fluid Volume Deficit  Goal: Maintain normal heart rhythm  Outcome: Ongoing  Goal: Maintain absence of muscle cramping  Outcome: Ongoing  Goal: Maintain normal serum potassium, sodium, calcium, phosphorus, and pH  Outcome: Ongoing     Problem: Nutrition Deficits  Goal: Optimize nutrtional status  Outcome: Ongoing     Problem: Loneliness or Risk for Loneliness  Goal: Demonstrate positive use of time alone when socialization is not possible  Outcome: Ongoing     Problem: Fatigue  Goal: Verbalize increase energy and improved vitality  Outcome: Ongoing     Problem: DAILY CARE  Goal: Daily care needs are met  Outcome: Ongoing

## 2020-08-22 ENCOUNTER — APPOINTMENT (OUTPATIENT)
Dept: GENERAL RADIOLOGY | Age: 85
DRG: 853 | End: 2020-08-22
Payer: MEDICARE

## 2020-08-22 LAB
ALBUMIN SERPL-MCNC: 2.6 GM/DL (ref 3.4–5)
ALP BLD-CCNC: 241 IU/L (ref 40–129)
ALT SERPL-CCNC: 135 U/L (ref 10–40)
ANION GAP SERPL CALCULATED.3IONS-SCNC: 9 MMOL/L (ref 4–16)
AST SERPL-CCNC: 88 IU/L (ref 15–37)
BASOPHILS ABSOLUTE: 0 K/CU MM
BASOPHILS RELATIVE PERCENT: 0.2 % (ref 0–1)
BILIRUB SERPL-MCNC: 0.5 MG/DL (ref 0–1)
BILIRUBIN DIRECT: 0.4 MG/DL (ref 0–0.3)
BILIRUBIN, INDIRECT: 0.1 MG/DL (ref 0–0.7)
BUN BLDV-MCNC: 40 MG/DL (ref 6–23)
CALCIUM SERPL-MCNC: 8.3 MG/DL (ref 8.3–10.6)
CHLORIDE BLD-SCNC: 108 MMOL/L (ref 99–110)
CO2: 23 MMOL/L (ref 21–32)
CREAT SERPL-MCNC: 1.5 MG/DL (ref 0.9–1.3)
CULTURE: NORMAL
DIFFERENTIAL TYPE: ABNORMAL
DOSE AMOUNT: NORMAL
DOSE TIME: NORMAL
EOSINOPHILS ABSOLUTE: 0.1 K/CU MM
EOSINOPHILS RELATIVE PERCENT: 1.8 % (ref 0–3)
GFR AFRICAN AMERICAN: 54 ML/MIN/1.73M2
GFR NON-AFRICAN AMERICAN: 44 ML/MIN/1.73M2
GLUCOSE BLD-MCNC: 105 MG/DL (ref 70–99)
HCT VFR BLD CALC: 28.7 % (ref 42–52)
HEMOGLOBIN: 8.8 GM/DL (ref 13.5–18)
HIGH SENSITIVE C-REACTIVE PROTEIN: 121.5 MG/L
IMMATURE NEUTROPHIL %: 0.6 % (ref 0–0.43)
LACTATE DEHYDROGENASE: 140 IU/L (ref 120–246)
LYMPHOCYTES ABSOLUTE: 0.7 K/CU MM
LYMPHOCYTES RELATIVE PERCENT: 12.8 % (ref 24–44)
Lab: NORMAL
MCH RBC QN AUTO: 27.7 PG (ref 27–31)
MCHC RBC AUTO-ENTMCNC: 30.7 % (ref 32–36)
MCV RBC AUTO: 90.3 FL (ref 78–100)
MONOCYTES ABSOLUTE: 0.8 K/CU MM
MONOCYTES RELATIVE PERCENT: 14 % (ref 0–4)
NUCLEATED RBC %: 0 %
PDW BLD-RTO: 14.5 % (ref 11.7–14.9)
PLATELET # BLD: 246 K/CU MM (ref 140–440)
PMV BLD AUTO: 9.5 FL (ref 7.5–11.1)
POTASSIUM SERPL-SCNC: 4.4 MMOL/L (ref 3.5–5.1)
PROCALCITONIN: 0.24
RBC # BLD: 3.18 M/CU MM (ref 4.6–6.2)
SARS-COV-2: DETECTED
SEGMENTED NEUTROPHILS ABSOLUTE COUNT: 3.8 K/CU MM
SEGMENTED NEUTROPHILS RELATIVE PERCENT: 70.6 % (ref 36–66)
SODIUM BLD-SCNC: 140 MMOL/L (ref 135–145)
SOURCE: ABNORMAL
SPECIMEN: NORMAL
TOTAL IMMATURE NEUTOROPHIL: 0.03 K/CU MM
TOTAL NUCLEATED RBC: 0 K/CU MM
TOTAL PROTEIN: 5.4 GM/DL (ref 6.4–8.2)
VANCOMYCIN RANDOM: 8.7 UG/ML
WBC # BLD: 5.4 K/CU MM (ref 4–10.5)

## 2020-08-22 PROCEDURE — 71045 X-RAY EXAM CHEST 1 VIEW: CPT

## 2020-08-22 PROCEDURE — 2580000003 HC RX 258: Performed by: INTERNAL MEDICINE

## 2020-08-22 PROCEDURE — 6370000000 HC RX 637 (ALT 250 FOR IP): Performed by: INTERNAL MEDICINE

## 2020-08-22 PROCEDURE — 83615 LACTATE (LD) (LDH) ENZYME: CPT

## 2020-08-22 PROCEDURE — 94761 N-INVAS EAR/PLS OXIMETRY MLT: CPT

## 2020-08-22 PROCEDURE — 80053 COMPREHEN METABOLIC PANEL: CPT

## 2020-08-22 PROCEDURE — 82248 BILIRUBIN DIRECT: CPT

## 2020-08-22 PROCEDURE — 85025 COMPLETE CBC W/AUTO DIFF WBC: CPT

## 2020-08-22 PROCEDURE — 1200000000 HC SEMI PRIVATE

## 2020-08-22 PROCEDURE — 84145 PROCALCITONIN (PCT): CPT

## 2020-08-22 PROCEDURE — 2700000000 HC OXYGEN THERAPY PER DAY

## 2020-08-22 PROCEDURE — 6360000002 HC RX W HCPCS: Performed by: INTERNAL MEDICINE

## 2020-08-22 PROCEDURE — 80202 ASSAY OF VANCOMYCIN: CPT

## 2020-08-22 PROCEDURE — 2580000003 HC RX 258: Performed by: EMERGENCY MEDICINE

## 2020-08-22 PROCEDURE — 86141 C-REACTIVE PROTEIN HS: CPT

## 2020-08-22 RX ADMIN — CEFEPIME HYDROCHLORIDE 1 G: 1 INJECTION, POWDER, FOR SOLUTION INTRAMUSCULAR; INTRAVENOUS at 13:12

## 2020-08-22 RX ADMIN — HEPARIN SODIUM 5000 UNITS: 5000 INJECTION, SOLUTION INTRAVENOUS; SUBCUTANEOUS at 13:11

## 2020-08-22 RX ADMIN — SODIUM CHLORIDE, PRESERVATIVE FREE 10 ML: 5 INJECTION INTRAVENOUS at 11:10

## 2020-08-22 RX ADMIN — SODIUM CHLORIDE, PRESERVATIVE FREE 10 ML: 5 INJECTION INTRAVENOUS at 21:41

## 2020-08-22 RX ADMIN — COLLAGENASE SANTYL: 250 OINTMENT TOPICAL at 21:43

## 2020-08-22 RX ADMIN — QUETIAPINE FUMARATE 100 MG: 100 TABLET ORAL at 11:09

## 2020-08-22 RX ADMIN — COLLAGENASE SANTYL: 250 OINTMENT TOPICAL at 11:09

## 2020-08-22 RX ADMIN — SODIUM CHLORIDE: 9 INJECTION, SOLUTION INTRAVENOUS at 21:41

## 2020-08-22 RX ADMIN — CEFEPIME HYDROCHLORIDE 1 G: 1 INJECTION, POWDER, FOR SOLUTION INTRAMUSCULAR; INTRAVENOUS at 02:47

## 2020-08-22 RX ADMIN — HEPARIN SODIUM 5000 UNITS: 5000 INJECTION, SOLUTION INTRAVENOUS; SUBCUTANEOUS at 22:09

## 2020-08-22 RX ADMIN — PANTOPRAZOLE SODIUM 40 MG: 40 TABLET, DELAYED RELEASE ORAL at 18:22

## 2020-08-22 RX ADMIN — SODIUM CHLORIDE: 9 INJECTION, SOLUTION INTRAVENOUS at 01:55

## 2020-08-22 RX ADMIN — QUETIAPINE FUMARATE 100 MG: 100 TABLET ORAL at 21:41

## 2020-08-22 RX ADMIN — PANTOPRAZOLE SODIUM 40 MG: 40 TABLET, DELAYED RELEASE ORAL at 06:44

## 2020-08-22 RX ADMIN — METOPROLOL SUCCINATE 25 MG: 25 TABLET, EXTENDED RELEASE ORAL at 11:09

## 2020-08-22 RX ADMIN — HEPARIN SODIUM 5000 UNITS: 5000 INJECTION, SOLUTION INTRAVENOUS; SUBCUTANEOUS at 06:44

## 2020-08-22 RX ADMIN — VANCOMYCIN HYDROCHLORIDE 1500 MG: 5 INJECTION, POWDER, LYOPHILIZED, FOR SOLUTION INTRAVENOUS at 15:32

## 2020-08-22 RX ADMIN — MIRTAZAPINE 45 MG: 15 TABLET, FILM COATED ORAL at 21:41

## 2020-08-22 ASSESSMENT — PAIN SCALES - GENERAL: PAINLEVEL_OUTOF10: 0

## 2020-08-22 NOTE — PROGRESS NOTES
Hospitalist Progress Note      Name:  Wellington Granger /Age/Sex: 1932  (80 y.o. male)   MRN & CSN:  [id number] & [id number] Admission Date/Time: 2020  1:17 AM   Location:  -A PCP: Stephen Sosa MD         Hospital Day: 2    Assessment and Plan:   Wellington Granger is a 80 y.o.  male  who presents with shortness of breath      Acute hypoxic respiratory failure, concern for COVID pneumonia  -Was recently treated for community-acquired pneumonia  -Chest x-ray on admission shows bibasilar atelectasis   -Repeat chest x-ray today  -Echo done normal EF, BNP within normal limits  -Leukocytosis trending down  -Respiratory panel pending  -COVID pending  -MRSA pending, de-escalate antibiotics based on culture  -Trend procalcitonin and CRP  -Encourage incentive spirometry  -Saturating in the 90s on 3 to 4 L  -Will need evaluation for home oxygen at discharge      Sepsis likely due to sacral decubitus ulcers, unstageable, present on admission  -Blood cultures pending  -Surgery on board  -Wound ostomy to evaluate  -On IV vancomycin and cefepime    Acute kidney injury on CKD 3  -Improving    Elevated liver enzymes  -Stable  -Recent hepatitis panel negative  -Recommend follow-up as outpatient with GI    Elevated d-dimer  -VQ scan was done on admission, shows low probability of PE    Chronic anemia, normocytic, baseline about 10, likely anemia of chronic disease  -Monitor  -Will order iron panel  -Transfuse for hemoglobin less than 7    Protein energy malnutrition in the setting of chronic decubitus wounds  -Dietitian on board    Chronic issues  -Hypertension  -Depression  -COPD, not in exacerbation    CODE STATUS -full code, palliative care consult to discuss goals of care  Patient can be transferred if COVID negative    Diet DIET CARDIAC;   Dietary Nutrition Supplements: Wound Healing Oral Supplement   DVT Prophylaxis [] Lovenox, []  Heparin, [] SCDs, []No VTE prophylaxis, patient ambulating   GI  metoprolol succinate  25 mg Oral Daily    mirtazapine  45 mg Oral Nightly    QUEtiapine  100 mg Oral BID    pantoprazole  40 mg Oral BID AC    vancomycin (VANCOCIN) intermittent dosing (placeholder)   Other RX Placeholder    collagenase   Topical BID      Infusions:    sodium chloride 100 mL/hr at 08/22/20 0155     PRN Meds: sodium chloride flush, 10 mL, PRN  acetaminophen, 650 mg, Q6H PRN    Or  acetaminophen, 650 mg, Q6H PRN  polyethylene glycol, 17 g, Daily PRN  promethazine, 12.5 mg, Q6H PRN    Or  ondansetron, 4 mg, Q6H PRN        Data    Recent Labs     08/21/20  0135 08/22/20  0700   WBC 10.6* 5.4   HGB 10.1* 8.8*   HCT 32.2* 28.7*    246      Recent Labs     08/21/20  0135 08/22/20  0700    140   K 4.4 4.4    108   CO2 25 23   BUN 59* 40*   CREATININE 1.9* 1.5*     Recent Labs     08/21/20  0135 08/22/20  0700   * 88*   * 135*   BILIDIR  --  0.4*   BILITOT 1.0 0.5   ALKPHOS 278* 241*     No results for input(s): INR in the last 72 hours. No results for input(s): CKTOTAL, CKMB, CKMBINDEX, TROPONINI in the last 72 hours.         Electronically signed by Rob Gresham MD on 8/22/2020 at 3:00 PM

## 2020-08-22 NOTE — PROGRESS NOTES
[address]  consulted by Dr. Rj Green for monitoring and adjustment. Indication for treatment: Sepsis, possible COVID-19 pneumonia vs. decubitus ulcer  Goal trough: 15 mcg/mL     Pertinent Laboratory Values:   Temp Readings from Last 3 Encounters:   08/22/20 99.2 °F (37.3 °C) (Oral)   08/13/20 98.6 °F (37 °C) (Oral)     Recent Labs     08/21/20  0135 08/22/20  0700   WBC 10.6* 5.4   LACTATE 1.4  --      Recent Labs     08/21/20  0135 08/22/20  0700   BUN 59* 40*   CREATININE 1.9* 1.5*     Estimated Creatinine Clearance: 38 mL/min (A) (based on SCr of 1.5 mg/dL (H)). Intake/Output Summary (Last 24 hours) at 8/22/2020 1241  Last data filed at 8/22/2020 0706  Gross per 24 hour   Intake --   Output 1700 ml   Net -1700 ml       Pertinent Cultures:  Date    Source    Results  8/21   Urine    Pending  8/21   Covid-19   Pending  8/21   Resp panel   Ordered  8/21   Blood    Ordered  8/21   MRSA screen   Ordered    Vancomycin level:   TROUGH:  No results for input(s): VANCOTROUGH in the last 72 hours. RANDOM:    Recent Labs     08/22/20  0700   VANCORANDOM 8.7     Assessment:  · WBC and temperature: WBC WNL, afebrile  · SCr, BUN, and urine output: RONALDO improving  · Day(s) of therapy: 2   · Vancomycin level: 8.7, ok to re-dose    Plan:  · Pulse dosing based on levels due to RONALDO  · Received 1750 mg x 1 yesterday   · Random concentration this AM is 8.7, re-dose with 1500 mg x 1 and repeat level tomorrow AM  · Pharmacy will continue to monitor patient and adjust therapy as indicated    Zachery 2118 8/23 @0600    Thank you for the consult.   Sidney Umanzor [place]  8/22/2020 12:41 PM

## 2020-08-23 LAB
ANION GAP SERPL CALCULATED.3IONS-SCNC: 8 MMOL/L (ref 4–16)
BASOPHILS ABSOLUTE: 0 K/CU MM
BASOPHILS RELATIVE PERCENT: 0.2 % (ref 0–1)
BUN BLDV-MCNC: 35 MG/DL (ref 6–23)
CALCIUM SERPL-MCNC: 8.2 MG/DL (ref 8.3–10.6)
CHLORIDE BLD-SCNC: 107 MMOL/L (ref 99–110)
CO2: 22 MMOL/L (ref 21–32)
CREAT SERPL-MCNC: 1.4 MG/DL (ref 0.9–1.3)
CULTURE: NORMAL
DIFFERENTIAL TYPE: ABNORMAL
DOSE AMOUNT: NORMAL
DOSE TIME: NORMAL
EOSINOPHILS ABSOLUTE: 0.1 K/CU MM
EOSINOPHILS RELATIVE PERCENT: 1.6 % (ref 0–3)
GFR AFRICAN AMERICAN: 58 ML/MIN/1.73M2
GFR NON-AFRICAN AMERICAN: 48 ML/MIN/1.73M2
GLUCOSE BLD-MCNC: 111 MG/DL (ref 70–99)
HCT VFR BLD CALC: 30.2 % (ref 42–52)
HEMOGLOBIN: 9.1 GM/DL (ref 13.5–18)
IMMATURE NEUTROPHIL %: 0.5 % (ref 0–0.43)
IRON: 16 UG/DL (ref 59–158)
LYMPHOCYTES ABSOLUTE: 1.1 K/CU MM
LYMPHOCYTES RELATIVE PERCENT: 18.6 % (ref 24–44)
Lab: NORMAL
MCH RBC QN AUTO: 27 PG (ref 27–31)
MCHC RBC AUTO-ENTMCNC: 30.1 % (ref 32–36)
MCV RBC AUTO: 89.6 FL (ref 78–100)
MONOCYTES ABSOLUTE: 0.7 K/CU MM
MONOCYTES RELATIVE PERCENT: 12 % (ref 0–4)
NUCLEATED RBC %: 0 %
PCT TRANSFERRIN: 9 % (ref 10–44)
PDW BLD-RTO: 14.3 % (ref 11.7–14.9)
PLATELET # BLD: 236 K/CU MM (ref 140–440)
PMV BLD AUTO: 9.4 FL (ref 7.5–11.1)
POTASSIUM SERPL-SCNC: 4.3 MMOL/L (ref 3.5–5.1)
RBC # BLD: 3.37 M/CU MM (ref 4.6–6.2)
SEGMENTED NEUTROPHILS ABSOLUTE COUNT: 3.8 K/CU MM
SEGMENTED NEUTROPHILS RELATIVE PERCENT: 67.1 % (ref 36–66)
SODIUM BLD-SCNC: 137 MMOL/L (ref 135–145)
SPECIMEN: NORMAL
TOTAL IMMATURE NEUTOROPHIL: 0.03 K/CU MM
TOTAL IRON BINDING CAPACITY: 186 UG/DL (ref 250–450)
TOTAL NUCLEATED RBC: 0 K/CU MM
UNSATURATED IRON BINDING CAPACITY: 170 UG/DL (ref 110–370)
VANCOMYCIN RANDOM: 13.5 UG/ML
WBC # BLD: 5.7 K/CU MM (ref 4–10.5)

## 2020-08-23 PROCEDURE — 11043 DBRDMT MUSC&/FSCA 1ST 20/<: CPT | Performed by: SURGERY

## 2020-08-23 PROCEDURE — 2580000003 HC RX 258: Performed by: EMERGENCY MEDICINE

## 2020-08-23 PROCEDURE — 6360000002 HC RX W HCPCS: Performed by: INTERNAL MEDICINE

## 2020-08-23 PROCEDURE — 94761 N-INVAS EAR/PLS OXIMETRY MLT: CPT

## 2020-08-23 PROCEDURE — 85025 COMPLETE CBC W/AUTO DIFF WBC: CPT

## 2020-08-23 PROCEDURE — 80202 ASSAY OF VANCOMYCIN: CPT

## 2020-08-23 PROCEDURE — 83540 ASSAY OF IRON: CPT

## 2020-08-23 PROCEDURE — 83550 IRON BINDING TEST: CPT

## 2020-08-23 PROCEDURE — 1200000000 HC SEMI PRIVATE

## 2020-08-23 PROCEDURE — 94150 VITAL CAPACITY TEST: CPT

## 2020-08-23 PROCEDURE — 2580000003 HC RX 258: Performed by: INTERNAL MEDICINE

## 2020-08-23 PROCEDURE — 80048 BASIC METABOLIC PNL TOTAL CA: CPT

## 2020-08-23 PROCEDURE — 99221 1ST HOSP IP/OBS SF/LOW 40: CPT | Performed by: SURGERY

## 2020-08-23 PROCEDURE — 11046 DBRDMT MUSC&/FSCA EA ADDL: CPT | Performed by: SURGERY

## 2020-08-23 PROCEDURE — 6370000000 HC RX 637 (ALT 250 FOR IP): Performed by: INTERNAL MEDICINE

## 2020-08-23 PROCEDURE — 0KBP0ZZ EXCISION OF LEFT HIP MUSCLE, OPEN APPROACH: ICD-10-PCS | Performed by: SURGERY

## 2020-08-23 PROCEDURE — 0KBN0ZZ EXCISION OF RIGHT HIP MUSCLE, OPEN APPROACH: ICD-10-PCS | Performed by: SURGERY

## 2020-08-23 RX ORDER — DEXAMETHASONE 4 MG/1
6 TABLET ORAL DAILY
Status: DISCONTINUED | OUTPATIENT
Start: 2020-08-23 | End: 2020-08-26

## 2020-08-23 RX ORDER — FERROUS SULFATE 325(65) MG
325 TABLET ORAL 2 TIMES DAILY WITH MEALS
Status: DISCONTINUED | OUTPATIENT
Start: 2020-08-23 | End: 2020-09-19 | Stop reason: HOSPADM

## 2020-08-23 RX ADMIN — CEFEPIME HYDROCHLORIDE 1 G: 1 INJECTION, POWDER, FOR SOLUTION INTRAMUSCULAR; INTRAVENOUS at 02:21

## 2020-08-23 RX ADMIN — FERROUS SULFATE TAB 325 MG (65 MG ELEMENTAL FE) 325 MG: 325 (65 FE) TAB at 18:05

## 2020-08-23 RX ADMIN — PANTOPRAZOLE SODIUM 40 MG: 40 TABLET, DELAYED RELEASE ORAL at 18:05

## 2020-08-23 RX ADMIN — SODIUM CHLORIDE, PRESERVATIVE FREE 10 ML: 5 INJECTION INTRAVENOUS at 21:46

## 2020-08-23 RX ADMIN — SODIUM CHLORIDE, PRESERVATIVE FREE 10 ML: 5 INJECTION INTRAVENOUS at 09:09

## 2020-08-23 RX ADMIN — PANTOPRAZOLE SODIUM 40 MG: 40 TABLET, DELAYED RELEASE ORAL at 06:41

## 2020-08-23 RX ADMIN — VANCOMYCIN HYDROCHLORIDE 1500 MG: 5 INJECTION, POWDER, LYOPHILIZED, FOR SOLUTION INTRAVENOUS at 09:09

## 2020-08-23 RX ADMIN — SODIUM CHLORIDE: 9 INJECTION, SOLUTION INTRAVENOUS at 09:09

## 2020-08-23 RX ADMIN — CEFEPIME HYDROCHLORIDE 1 G: 1 INJECTION, POWDER, FOR SOLUTION INTRAMUSCULAR; INTRAVENOUS at 14:44

## 2020-08-23 RX ADMIN — COLLAGENASE SANTYL: 250 OINTMENT TOPICAL at 09:17

## 2020-08-23 RX ADMIN — QUETIAPINE FUMARATE 100 MG: 100 TABLET ORAL at 09:10

## 2020-08-23 RX ADMIN — COLLAGENASE SANTYL: 250 OINTMENT TOPICAL at 21:46

## 2020-08-23 RX ADMIN — DEXAMETHASONE 6 MG: 4 TABLET ORAL at 12:10

## 2020-08-23 RX ADMIN — HEPARIN SODIUM 5000 UNITS: 5000 INJECTION, SOLUTION INTRAVENOUS; SUBCUTANEOUS at 06:41

## 2020-08-23 RX ADMIN — HEPARIN SODIUM 5000 UNITS: 5000 INJECTION, SOLUTION INTRAVENOUS; SUBCUTANEOUS at 21:47

## 2020-08-23 RX ADMIN — MIRTAZAPINE 45 MG: 15 TABLET, FILM COATED ORAL at 21:46

## 2020-08-23 RX ADMIN — HEPARIN SODIUM 5000 UNITS: 5000 INJECTION, SOLUTION INTRAVENOUS; SUBCUTANEOUS at 14:44

## 2020-08-23 RX ADMIN — QUETIAPINE FUMARATE 100 MG: 100 TABLET ORAL at 21:46

## 2020-08-23 RX ADMIN — METOPROLOL SUCCINATE 25 MG: 25 TABLET, EXTENDED RELEASE ORAL at 09:10

## 2020-08-23 ASSESSMENT — PAIN SCALES - GENERAL
PAINLEVEL_OUTOF10: 0
PAINLEVEL_OUTOF10: 0

## 2020-08-23 NOTE — PROGRESS NOTES
Hospitalist Progress Note      Name:  Evelin Elizabeth /Age/Sex: 1932  (80 y.o. male)   MRN & CSN:  [id number] & [id number] Admission Date/Time: 2020  1:17 AM   Location:  -A PCP: Adelaide Johnson MD         Hospital Day: 3    Assessment and Plan:   Evelin Elizabeth is a 80 y.o.  male  who presents with shortness of breath    Acute hypoxic respiratory failure, concern for COVID pneumonia  -Was recently treated for community-acquired pneumonia  -Chest x-ray on admission shows bibasilar atelectasis   -Echo done normal EF, BNP within normal limits  -Leukocytosis trending down  -Respiratory panel pending  -COVID positive  -MRSA positive, DC Vanc  -Trend procalcitonin and CRP  -Encourage incentive spirometry  -Saturating in the 90s on 2L, started on steroids, improved O2 requirements  -Patient's daughter open to remdesivir and CP discuss with ID     Sepsis likely due to sacral decubitus ulcers, unstageable, present on admission  -Blood cultures negative to date  -Surgery on board  -Wound ostomy to evaluate  -On IV vancomycin and cefepime, dc vanc     Acute kidney injury on CKD 3  -Improving     Elevated liver enzymes  -Stable  -Recent hepatitis panel negative  -Recommend follow-up as outpatient with GI     Elevated d-dimer  -VQ scan was done on admission, shows low probability of PE     Chronic anemia, normocytic, baseline about 10, likely anemia of chronic disease  -Monitor  -Iron panel shows severe LUIS, start on iron supplements  -Transfuse for hemoglobin less than 7     Protein energy malnutrition in the setting of chronic decubitus wounds  -Dietitian on board     Chronic issues  -Hypertension  -Depression  -COPD, not in exacerbation     CODE STATUS -full code, palliative care consult to discuss goals of care  I discussed with daughter Peter Thapa who makes medical decisions, patient remains a full code, open to options of remdesivir and convalescent plasma if appropriate.       Diet DIET CARDIAC; Dietary Nutrition Supplements: Wound Healing Oral Supplement   DVT Prophylaxis [] Lovenox, []  Heparin, [] SCDs, []No VTE prophylaxis, patient ambulating   GI Prophylaxis [] PPI, [] H2 Blocker, [] No GI prophylaxis, patient is receiving diet/Tube Feeds   Code Status Full Code   Disposition Patient requires continued admission due to    MDM [] Low, [] Moderate,[]  High  Patient's risk as above due to      History of Present Illness:     Pt S&E. No complaints today  10-14 point ROS reviewed negative, unless as noted above    Objective: Intake/Output Summary (Last 24 hours) at 8/23/2020 1521  Last data filed at 8/23/2020 1444  Gross per 24 hour   Intake 1590 ml   Output 3075 ml   Net -1485 ml      Vitals:   Vitals:    08/23/20 1205   BP: 132/60   Pulse: 58   Resp: 21   Temp:    SpO2: 97%     Physical Exam:    GEN Awake male, sitting upright in bed in no apparent distress. Appears given age. EYES Pupils are equally round. No scleral erythema, discharge, or conjunctivitis. HENT Mucous membranes are moist.   NECK No apparent thyromegaly or masses. RESP Clear to auscultation, no wheezes, rales or rhonchi. Symmetric chest movement while on room air. CARDIO/VASC S1/S2 auscultated. Regular rate without appreciable murmurs, rubs, or gallops. Peripheral pulses equal bilaterally and palpable. No peripheral edema. GI Abdomen is soft without significant tenderness, masses, or guarding. Bowel sounds are normoactive. Rectal exam deferred.  Greene catheter is not present. HEME/LYMPH No petechiae or ecchymoses. MSK No gross joint deformities. Spontaneous movement of all extremities  SKIN Normal coloration, warm, dry. NEURO Cranial nerves appear grossly intact, normal speech, no lateralizing weakness. PSYCH Awake, alert, oriented x 4. Affect appropriate.     Medications:   Medications:    dexamethasone  6 mg Oral Daily    sodium chloride flush  10 mL Intravenous 2 times per day    heparin (porcine) 5,000 Units Subcutaneous 3 times per day    cefepime  1 g Intravenous Q12H    metoprolol succinate  25 mg Oral Daily    mirtazapine  45 mg Oral Nightly    QUEtiapine  100 mg Oral BID    pantoprazole  40 mg Oral BID AC    vancomycin (VANCOCIN) intermittent dosing (placeholder)   Other RX Placeholder    collagenase   Topical BID      Infusions:    sodium chloride 100 mL/hr at 08/23/20 0909     PRN Meds: sodium chloride flush, 10 mL, PRN  acetaminophen, 650 mg, Q6H PRN    Or  acetaminophen, 650 mg, Q6H PRN  polyethylene glycol, 17 g, Daily PRN  promethazine, 12.5 mg, Q6H PRN    Or  ondansetron, 4 mg, Q6H PRN        Data    Recent Labs     08/21/20 0135 08/22/20  0700 08/23/20  0245   WBC 10.6* 5.4 5.7   HGB 10.1* 8.8* 9.1*   HCT 32.2* 28.7* 30.2*    246 236      Recent Labs     08/21/20 0135 08/22/20  0700 08/23/20  0245    140 137   K 4.4 4.4 4.3    108 107   CO2 25 23 22   BUN 59* 40* 35*   CREATININE 1.9* 1.5* 1.4*     Recent Labs     08/21/20 0135 08/22/20  0700   * 88*   * 135*   BILIDIR  --  0.4*   BILITOT 1.0 0.5   ALKPHOS 278* 241*     No results for input(s): INR in the last 72 hours. No results for input(s): CKTOTAL, CKMB, CKMBINDEX, TROPONINI in the last 72 hours.         Electronically signed by Bina Quiroz MD on 8/23/2020 at 3:21 PM

## 2020-08-23 NOTE — PROCEDURES
PROCEDURE IN DETAIL:  Prior to beginning the procedure,verbal consent was obtained. The patient was brought positioned on his right side. Anesthetic was not used as tissue to be debrided was necrotic and little pain was expected    The wound was inspected and was approximately 7cm x 12cm and 4cm deep extending to tendon. There were multiple areas of necrotic tissue and eschar both within the wound and over the adjacent periwound skin. Excisional debridement was performed including epidermis, dermis, subcutaneous tissue and muscle/fascia. Using #15 blade scalpel, biofilm, slough, necrotic/eschar and exudate was debrided to healthy appearing tissue. The area debrided was approximately [id number]. Hemostasis was achieved with pressure and wound packing. The procedure was concluded. The skin was dried and kerlex packing, ABD gauze and tape where applied. Wound care instructions were placed in the patient's orders. . The patient tolerated the procedure well with no apparent complications.     Electronically signed by Davida Stallworth MD on 8/23/2020 at 8:03 PM

## 2020-08-23 NOTE — PROGRESS NOTES
[address] [facility]  consulted by Dr. Candy Quan for monitoring and adjustment. Indication for treatment:COVID-19 pneumonia vs. decubitus ulcer  Goal trough: 15 mcg/mL     Pertinent Laboratory Values:   Temp Readings from Last 3 Encounters:   08/23/20 99.6 °F (37.6 °C) (Axillary)   08/13/20 98.6 °F (37 °C) (Oral)     Recent Labs     08/21/20  0135 08/22/20  0700 08/23/20  0245   WBC 10.6* 5.4 5.7   LACTATE 1.4  --   --      Recent Labs     08/21/20  0135 08/22/20  0700 08/23/20  0245   BUN 59* 40* 35*   CREATININE 1.9* 1.5* 1.4*     Estimated Creatinine Clearance: 40 mL/min (A) (based on SCr of 1.4 mg/dL (H)). Intake/Output Summary (Last 24 hours) at 8/23/2020 1145  Last data filed at 8/23/2020 0909  Gross per 24 hour   Intake 1470 ml   Output 1675 ml   Net -205 ml       Pertinent Cultures:  Date    Source    Results  8/21   Urine    NGTD  8/21   Covid-19   Positive  8/21   Resp panel   Ordered  8/21   Blood    NGTD  8/21   MRSA screen   Negative    Vancomycin level:   TROUGH:  No results for input(s): VANCOTROUGH in the last 72 hours. RANDOM:    Recent Labs     08/22/20  0700 08/23/20  0245   VANCORANDOM 8.7 13.5     Assessment:  · WBC and temperature: WBC WNL, afebrile  · SCr, BUN, and urine output: RONALDO improving  · Day(s) of therapy: 3   · Vancomycin level: 13.5, ok to re-dose    Plan:  · Pulse dosing based on levels due to RONALDO  · Received 1500 mg x 1 yesterday   · Random concentration this AM is 13.5, re-dose with 1500 mg x 1 and repeat level tomorrow AM, hopefully a regimen can be scheduled in the next 24-48h   · Pharmacy will continue to monitor patient and adjust therapy as indicated    RANDOM VANCOMYCIN LEVEL SCHEDULED FOR 8/24 @0600    Thank you for the consult.   Cam Candelario RPh  8/23/2020 11:45 AM

## 2020-08-23 NOTE — CONSULTS
Q6H PRN Windy Rehman MD        polyethylene glycol (GLYCOLAX) packet 17 g  17 g Oral Daily PRN Windy Rehman MD        promethazine (PHENERGAN) tablet 12.5 mg  12.5 mg Oral Q6H PRN Windy Rehman MD        Or    ondansetron (ZOFRAN) injection 4 mg  4 mg Intravenous Q6H PRN Windy Rehman MD        heparin (porcine) injection 5,000 Units  5,000 Units Subcutaneous 3 times per day Windy Rehman MD   5,000 Units at 20 1444    cefepime (MAXIPIME) 1 g IVPB minibag  1 g Intravenous Q12H Windy Rehman MD   Stopped at 20 1543    metoprolol succinate (TOPROL XL) extended release tablet 25 mg  25 mg Oral Daily Windy Rehman MD   25 mg at 20 0910    mirtazapine (REMERON) tablet 45 mg  45 mg Oral Nightly Windy Rehman MD   45 mg at 20 2141    QUEtiapine (SEROQUEL) tablet 100 mg  100 mg Oral BID Windy Rehman MD   100 mg at 20 0910    pantoprazole (PROTONIX) tablet 40 mg  40 mg Oral BID AC Windy Rehman MD   40 mg at 20 0641    vancomycin (VANCOCIN) intermittent dosing (placeholder)   Other RX Placeholder Windy Rehman MD        collagenase ointment   Topical BID Hannah Mendes MD           Allergies:  Patient has no known allergies.     Social History:   Social History     Socioeconomic History    Marital status:      Spouse name: Not on file    Number of children: 3    Years of education: Not on file    Highest education level: Not on file   Occupational History    Not on file   Social Needs    Financial resource strain: Not on file    Food insecurity     Worry: Not on file     Inability: Not on file   [employer] needs     Medical: Not on file     Non-medical: Not on file   Tobacco Use    Smoking status: Former Smoker     Last attempt to quit: 1974     Years since quittin.7    Smokeless tobacco: Current User     Types: Chew   Substance and Sexual Activity    Alcohol use: No     Comment: used to drimk on weekends stopped 2 years ago    Drug use: No    Sexual activity: Not on file   Lifestyle    Physical activity     Days per week: Not on file     Minutes per session: Not on file    Stress: Not on file   Relationships    Social connections     Talks on phone: Not on file     Gets together: Not on file     Attends Latter day service: Not on file     Active member of club or organization: Not on file     Attends meetings of clubs or organizations: Not on file     Relationship status: Not on file    Intimate partner violence     Fear of current or ex partner: Not on file     Emotionally abused: Not on file     Physically abused: Not on file     Forced sexual activity: Not on file   Other Topics Concern    Not on file   Social History Narrative    Not on file       Family History:   Family History   Problem Relation Age of Onset    Cancer Mother     Cancer Father     Cancer Sister        REVIEW OFSYSTEMS:    Unable to obtain, patient is poor historian       PHYSICAL EXAM:  Vitals:    08/23/20 0814 08/23/20 0821 08/23/20 1143 08/23/20 1205   BP:  138/61  132/60   Pulse:  63  58   Resp: 18 24 23 21   Temp:  99.6 °F (37.6 °C)     TempSrc:  Axillary     SpO2: 94% 95% 96% 97%   Weight:       Height:           Physical Exam  General: awake, alert, in no acute distress, slow of speech  HEENT: mucous membranes moist  Respiratory: normal effort, no wheezes appreciated  CV: appears well perfused, regular rate and rhythm  Abdomen: Soft, non-tender, non-distended.    Buttock: Large sacral ulcer extending to the level of tendon with necrotic edges and several areas of eschar along the wound edge, moderate amount of necrotic debris within the wound(wound photos in media tab)  Skin: warm and dry  Extremities: atraumatic  Neuro: no focal deficits noted  Psych: mood normal        DATA:    Lab Results   Component Value Date    WBC 5.7 08/23/2020    HGB 9.1 (L) 08/23/2020    HCT 30.2 (L) 08/23/2020    MCV 89.6 08/23/2020     08/23/2020     Lab Results   Component Value Date     08/23/2020    K 4.3 08/23/2020     08/23/2020    CO2 22 08/23/2020    BUN 35 08/23/2020    CREATININE 1.4 08/23/2020    GLUCOSE 111 08/23/2020    CALCIUM 8.2 08/23/2020          IMPRESSION:    80 y.o. male with chronic sacral ulcer admitted with pneumonia/Covid.     Patient Active Problem List:     Pneumonia due to organism     ARF (acute respiratory failure) ([address]     Essential hypertension, benign     Depressive disorder, not elsewhere classified     Acute renal failure (HCC)     Metabolic encephalopathy     SOB (shortness of breath)     Elevated liver enzymes     Acute respiratory failure ([address]        PLAN:  Bedside debridement performed today    The procedure was discussed with the patient and verbal consent was obtained prior to debridement        Electronically signed by Derian Isbell MD on 8/23/2020 at 5:42 PM

## 2020-08-24 LAB
ANION GAP SERPL CALCULATED.3IONS-SCNC: 9 MMOL/L (ref 4–16)
BUN BLDV-MCNC: 35 MG/DL (ref 6–23)
CALCIUM SERPL-MCNC: 8.1 MG/DL (ref 8.3–10.6)
CHLORIDE BLD-SCNC: 106 MMOL/L (ref 99–110)
CO2: 23 MMOL/L (ref 21–32)
CREAT SERPL-MCNC: 1.3 MG/DL (ref 0.9–1.3)
DOSE AMOUNT: NORMAL
DOSE TIME: NORMAL
GFR AFRICAN AMERICAN: >60 ML/MIN/1.73M2
GFR NON-AFRICAN AMERICAN: 52 ML/MIN/1.73M2
GLUCOSE BLD-MCNC: 116 MG/DL (ref 70–99)
HIGH SENSITIVE C-REACTIVE PROTEIN: 67.7 MG/L
POTASSIUM SERPL-SCNC: 4.6 MMOL/L (ref 3.5–5.1)
PROCALCITONIN: 0.19
REASON FOR REJECTION: NORMAL
REJECTED TEST: NORMAL
SODIUM BLD-SCNC: 138 MMOL/L (ref 135–145)
VANCOMYCIN RANDOM: 14.7 UG/ML

## 2020-08-24 PROCEDURE — 1200000000 HC SEMI PRIVATE

## 2020-08-24 PROCEDURE — 94150 VITAL CAPACITY TEST: CPT

## 2020-08-24 PROCEDURE — 2700000000 HC OXYGEN THERAPY PER DAY

## 2020-08-24 PROCEDURE — 80202 ASSAY OF VANCOMYCIN: CPT

## 2020-08-24 PROCEDURE — 86141 C-REACTIVE PROTEIN HS: CPT

## 2020-08-24 PROCEDURE — 6360000002 HC RX W HCPCS: Performed by: INTERNAL MEDICINE

## 2020-08-24 PROCEDURE — 84145 PROCALCITONIN (PCT): CPT

## 2020-08-24 PROCEDURE — 94761 N-INVAS EAR/PLS OXIMETRY MLT: CPT

## 2020-08-24 PROCEDURE — 2580000003 HC RX 258: Performed by: INTERNAL MEDICINE

## 2020-08-24 PROCEDURE — 6370000000 HC RX 637 (ALT 250 FOR IP): Performed by: INTERNAL MEDICINE

## 2020-08-24 PROCEDURE — 80048 BASIC METABOLIC PNL TOTAL CA: CPT

## 2020-08-24 PROCEDURE — 99231 SBSQ HOSP IP/OBS SF/LOW 25: CPT | Performed by: SURGERY

## 2020-08-24 PROCEDURE — 99222 1ST HOSP IP/OBS MODERATE 55: CPT | Performed by: OBSTETRICS & GYNECOLOGY

## 2020-08-24 PROCEDURE — 85025 COMPLETE CBC W/AUTO DIFF WBC: CPT

## 2020-08-24 RX ADMIN — METOPROLOL SUCCINATE 25 MG: 25 TABLET, EXTENDED RELEASE ORAL at 10:52

## 2020-08-24 RX ADMIN — CEFEPIME HYDROCHLORIDE 1 G: 1 INJECTION, POWDER, FOR SOLUTION INTRAMUSCULAR; INTRAVENOUS at 14:38

## 2020-08-24 RX ADMIN — FERROUS SULFATE TAB 325 MG (65 MG ELEMENTAL FE) 325 MG: 325 (65 FE) TAB at 15:35

## 2020-08-24 RX ADMIN — CEFEPIME HYDROCHLORIDE 1 G: 1 INJECTION, POWDER, FOR SOLUTION INTRAMUSCULAR; INTRAVENOUS at 03:16

## 2020-08-24 RX ADMIN — PANTOPRAZOLE SODIUM 40 MG: 40 TABLET, DELAYED RELEASE ORAL at 05:15

## 2020-08-24 RX ADMIN — QUETIAPINE FUMARATE 100 MG: 100 TABLET ORAL at 10:53

## 2020-08-24 RX ADMIN — SODIUM CHLORIDE, PRESERVATIVE FREE 10 ML: 5 INJECTION INTRAVENOUS at 21:30

## 2020-08-24 RX ADMIN — HEPARIN SODIUM 5000 UNITS: 5000 INJECTION, SOLUTION INTRAVENOUS; SUBCUTANEOUS at 05:15

## 2020-08-24 RX ADMIN — DEXAMETHASONE 6 MG: 4 TABLET ORAL at 10:53

## 2020-08-24 RX ADMIN — FERROUS SULFATE TAB 325 MG (65 MG ELEMENTAL FE) 325 MG: 325 (65 FE) TAB at 10:52

## 2020-08-24 RX ADMIN — VANCOMYCIN HYDROCHLORIDE 1500 MG: 5 INJECTION, POWDER, LYOPHILIZED, FOR SOLUTION INTRAVENOUS at 15:17

## 2020-08-24 RX ADMIN — PANTOPRAZOLE SODIUM 40 MG: 40 TABLET, DELAYED RELEASE ORAL at 15:35

## 2020-08-24 RX ADMIN — COLLAGENASE SANTYL: 250 OINTMENT TOPICAL at 10:51

## 2020-08-24 RX ADMIN — COLLAGENASE SANTYL: 250 OINTMENT TOPICAL at 21:32

## 2020-08-24 RX ADMIN — HEPARIN SODIUM 5000 UNITS: 5000 INJECTION, SOLUTION INTRAVENOUS; SUBCUTANEOUS at 15:35

## 2020-08-24 RX ADMIN — QUETIAPINE FUMARATE 100 MG: 100 TABLET ORAL at 21:29

## 2020-08-24 RX ADMIN — SODIUM CHLORIDE, PRESERVATIVE FREE 10 ML: 5 INJECTION INTRAVENOUS at 15:18

## 2020-08-24 RX ADMIN — HEPARIN SODIUM 5000 UNITS: 5000 INJECTION, SOLUTION INTRAVENOUS; SUBCUTANEOUS at 21:37

## 2020-08-24 RX ADMIN — MIRTAZAPINE 45 MG: 15 TABLET, FILM COATED ORAL at 21:29

## 2020-08-24 ASSESSMENT — PAIN SCALES - GENERAL
PAINLEVEL_OUTOF10: 0

## 2020-08-24 ASSESSMENT — PAIN SCALES - WONG BAKER: WONGBAKER_NUMERICALRESPONSE: 0

## 2020-08-24 NOTE — PROGRESS NOTES
GENERAL SURGERY PROGRESS NOTE    Geovanna Paulino is a 80 y.o. male s/p sacral ulcer debridement on 8/23. Subjective:  Doing ok this morning. Remains on room air. Objective:    Vitals: VITALS:  BP (!) 144/67   Pulse 76   Temp 97.6 °F (36.4 °C) (Axillary)   Resp 17   Ht 5' 8\" (1.727 m)   Wt 200 lb 3.2 oz (90.8 kg)   SpO2 96%   BMI 30.44 kg/m²     I/O: 08/23 0701 - 08/24 0700  In: 240 [P.O.:240]  Out: 3300 [Urine:3300]    Labs/Imaging Results:   Lab Results   Component Value Date     08/24/2020    K 4.6 08/24/2020     08/24/2020    CO2 23 08/24/2020    BUN 35 08/24/2020    CREATININE 1.3 08/24/2020    GLUCOSE 116 08/24/2020    CALCIUM 8.1 08/24/2020      Lab Results   Component Value Date    WBC 5.7 08/23/2020    HGB 9.1 (L) 08/23/2020    HCT 30.2 (L) 08/23/2020    MCV 89.6 08/23/2020     08/23/2020       Scheduled Meds:   vancomycin, 1,500 mg, Intravenous, Once    dexamethasone, 6 mg, Oral, Daily    ferrous sulfate, 325 mg, Oral, BID WC    sodium chloride flush, 10 mL, Intravenous, 2 times per day    heparin (porcine), 5,000 Units, Subcutaneous, 3 times per day    cefepime, 1 g, Intravenous, Q12H    metoprolol succinate, 25 mg, Oral, Daily    mirtazapine, 45 mg, Oral, Nightly    QUEtiapine, 100 mg, Oral, BID    pantoprazole, 40 mg, Oral, BID AC    vancomycin (VANCOCIN) intermittent dosing (placeholder), , Other, RX Placeholder    collagenase, , Topical, BID    Physical Exam:  General: Alert and awake, interactive, no distress. HEENT: Anicteric sclerae, MMM. Extremities: No edema bilat LE. Buttock: Large sacral ulcer extending to the level of tendon with necrotic edges and several areas of eschar along the wound edge, still with a moderate amount of necrotic tissue associated with his wound but less after bedside debridement yesterday  Abdomen: Soft, nontender, nondistended. Assessment and Plan:  80 y.o. male s/p debridement of sacral ulcer.   Covid positive. Wound is a little  after bedside debridement yesterday but continues to have some necrotic tissue present. He may require further debridements down the road but for now will continue with local wound cares.     Patient Active Problem List:     Pneumonia due to organism     ARF (acute respiratory failure) ([facility]     Essential hypertension, benign     Depressive disorder, not elsewhere classified     Acute renal failure (HCC)     Metabolic encephalopathy     SOB (shortness of breath)     Elevated liver enzymes     Acute respiratory failure ([facility]     Stage III pressure ulcer of sacral region [facility])      - continue wound cares as ordered by Wound Care team with santyl/NS damp gauze/ABD/skin prep to linda-wound and paper tape BID  - will continue to follow and assess the wound intermittently    Keisha Hassan MD  8/24/2020  11:56 AM

## 2020-08-24 NOTE — CARE COORDINATION
CM reviewed notes. COVID 8/21 was positive. Palliative care is to see pt. Pt had wound debridement on Coccyx per Dr Hassan Son on 8/23. CM called pts dtr, Tamar Mckenzie. CM informed per dtr that discharge plan at this time is for pt to return to [facility] when discharged  CM placed a call to Galen Vick at [facility] and left a Message. CM will await call back from [facility]. CM will follow for discharge planning.   1140  CM spoke with Hedy at [facility]. Pt is able to return to [facility] but will need a Precert. Pt will need PT/OT to see pt when he is medically ready. CM will follow.  [address]

## 2020-08-24 NOTE — PROGRESS NOTES
Hospitalist Progress Note      Name:  Colin Leomn /Age/Sex: 1932  (80 y.o. male)   MRN & CSN:  [id number] & [id number] Admission Date/Time: 2020  1:17 AM   Location:  -A PCP: Demetrio Navarro MD         Hospital Day: 4    Assessment and Plan:   Colin Lemon is a 80 y.o.  male  who presents with shortness of breath. Acute hypoxic respiratory failure due to COVID pneumonia  -Was recently treated for community-acquired pneumonia  -Chest x-ray on admission shows bibasilar atelectasis   -Echo done normal EF, BNP within normal limits  -Leukocytosis trending down  -Respiratory panel pending  -COVID positive  -MRSA negative, DC Vanc  -Trend procalcitonin and CRP  -Encourage incentive spirometry  -Saturating in the 90s on 2L, started on steroids, improved O2 requirements  -Patient's daughter open to remdesivir and CP discuss with ID  -ID on board     Sepsis likely due to sacral decubitus ulcers, unstageable, present on admission  -Blood cultures negative to date  -Surgery on board.  Had I and D  -Wound ostomy to evaluate  -On IV vancomycin and cefepime, dc vanc     Acute kidney injury on CKD 3  -Improving     Elevated liver enzymes  -Stable  -Recent hepatitis panel negative  -Recommend follow-up as outpatient with GI     Elevated d-dimer  -VQ scan was done on admission, shows low probability of PE     Chronic anemia, normocytic, baseline about 10, likely anemia of chronic disease  -Monitor  -Iron panel shows severe LUIS, start on iron supplements  -Transfuse for hemoglobin less than 7     Protein energy malnutrition in the setting of chronic decubitus wounds  -Dietitian on board     Chronic issues  -Hypertension  -Depression  -COPD, not in exacerbation     CODE STATUS -full code, palliative care consult to discuss goals of care  I discussed with daughter Wilbur who makes medical decisions, open to options of remdesivir and convalescent plasma if appropriate.     Disposition- to go back to [facility] when medically stable, pt/ot to evaluate    Diet DIET CARDIAC; Dietary Nutrition Supplements: Wound Healing Oral Supplement   DVT Prophylaxis [] Lovenox, []  Heparin, [] SCDs, []No VTE prophylaxis, patient ambulating   GI Prophylaxis [] PPI, [] H2 Blocker, [] No GI prophylaxis, patient is receiving diet/Tube Feeds   Code Status Full Code   Disposition Patient requires continued admission due to    MDM [] Low, [] Moderate,[]  High  Patient's risk as above due to      History of Present Illness:     Pt S&E. No complaints    10-14 point ROS reviewed negative, unless as noted above    Objective: Intake/Output Summary (Last 24 hours) at 8/24/2020 1447  Last data filed at 8/24/2020 0140  Gross per 24 hour   Intake --   Output 1900 ml   Net -1900 ml      Vitals:   Vitals:    08/24/20 1110   BP:    Pulse:    Resp:    Temp:    SpO2: 96%     Physical Exam:    GEN Awake male, sitting upright in bed in no apparent distress. Appears given age. EYES Pupils are equally round. No scleral erythema, discharge, or conjunctivitis. HENT Mucous membranes are moist.   NECK No apparent thyromegaly or masses. RESP Clear to auscultation, no wheezes, rales or rhonchi. Symmetric chest movement while on room air. CARDIO/VASC S1/S2 auscultated. Regular rate without appreciable murmurs, rubs, or gallops. Peripheral pulses equal bilaterally and palpable. No peripheral edema. GI Abdomen is soft without significant tenderness, masses, or guarding. Bowel sounds are normoactive. Rectal exam deferred.  Greene catheter is not present. HEME/LYMPH No petechiae or ecchymoses. MSK No gross joint deformities. Spontaneous movement of all extremities  SKIN Normal coloration, warm, dry. Sacral decubitus wounds , stage 4  NEURO Cranial nerves appear grossly intact, normal speech, no lateralizing weakness. PSYCH Awake, alert, oriented x 4. Affect appropriate.     Medications:   Medications:    vancomycin  1,500 mg Intravenous Once  [START ON 8/25/2020] vancomycin  1,500 mg Intravenous Q24H    dexamethasone  6 mg Oral Daily    ferrous sulfate  325 mg Oral BID WC    sodium chloride flush  10 mL Intravenous 2 times per day    heparin (porcine)  5,000 Units Subcutaneous 3 times per day    cefepime  1 g Intravenous Q12H    metoprolol succinate  25 mg Oral Daily    mirtazapine  45 mg Oral Nightly    QUEtiapine  100 mg Oral BID    pantoprazole  40 mg Oral BID AC    collagenase   Topical BID      Infusions:   PRN Meds: sodium chloride flush, 10 mL, PRN  acetaminophen, 650 mg, Q6H PRN    Or  acetaminophen, 650 mg, Q6H PRN  polyethylene glycol, 17 g, Daily PRN  promethazine, 12.5 mg, Q6H PRN    Or  ondansetron, 4 mg, Q6H PRN        Data    Recent Labs     08/22/20  0700 08/23/20  0245   WBC 5.4 5.7   HGB 8.8* 9.1*   HCT 28.7* 30.2*    236      Recent Labs     08/22/20  0700 08/23/20  0245 08/24/20  0534    137 138   K 4.4 4.3 4.6    107 106   CO2 23 22 23   BUN 40* 35* 35*   CREATININE 1.5* 1.4* 1.3     Recent Labs     08/22/20  0700   AST 88*   *   BILIDIR 0.4*   BILITOT 0.5   ALKPHOS 241*     No results for input(s): INR in the last 72 hours. No results for input(s): CKTOTAL, CKMB, CKMBINDEX, TROPONINI in the last 72 hours.         Electronically signed by Anju Carter MD on 8/24/2020 at 2:47 PM

## 2020-08-24 NOTE — CONSULTS
Palliative Medicine Consultation    Reason for Consult:      __X___ Advance Care Planning  __X___Transition of Care Planning  __X___ Psychosocial Support      Recommendations:    1. Continue with the excellent care of this patient with COVID-19 and sacral decubitus ulcer. 2.  Change code status to no intubation, no CPR, no defibrillation. 3.  Daughter states that patient does have advance directives at home. Requesting Physician:  Dr. Alvina Hernandez:  Hypoxia, fever, decubitus ulcer    History Obtained From:  family member - daughter, electronic medical record    HISTORY OF PRESENT ILLNESS:    80year old male who presented from [address] [place] [address] with hypoxia and fever. His oxygen saturations were in the 70's. He is known to have a stage 3 sacral decubitus ulcer. According to the daughter, her father tested negative for COVIA-19 at discharged in August but is now positive. He was discharged 2020 with a large duodenal ulcer. Patient also with CKD-3. His wife  a year ago and since that time he rarely leaves the house but was taking care of himself with the help of his daughter, son and son-in-law. Palliative Care was asked to see the patient for goals of care and family support.               Past Medical History:        Diagnosis Date    Anxiety     Depression     Hypertension     Nerves        Past Surgical History:        Procedure Laterality Date    JOINT REPLACEMENT      right hip    UPPER GASTROINTESTINAL ENDOSCOPY N/A 2020    EGD BIOPSY performed by Deja Horn MD at [facility] ENDOSCOPY       Current Medications:    Current Facility-Administered Medications: vancomycin (VANCOCIN) 1,500 mg in dextrose 5 % 500 mL IVPB, 1,500 mg, Intravenous, Once  dexamethasone (DECADRON) tablet 6 mg, 6 mg, Oral, Daily  ferrous sulfate (IRON 325) tablet 325 mg, 325 mg, Oral, BID WC  sodium chloride flush 0.9 % injection 10 mL, 10 mL, Intravenous, 2 times per day  sodium chloride flush 0.9 % injection 10 mL, 10 mL, Intravenous, PRN  acetaminophen (TYLENOL) tablet 650 mg, 650 mg, Oral, Q6H PRN **OR** acetaminophen (TYLENOL) suppository 650 mg, 650 mg, Rectal, Q6H PRN  polyethylene glycol (GLYCOLAX) packet 17 g, 17 g, Oral, Daily PRN  promethazine (PHENERGAN) tablet 12.5 mg, 12.5 mg, Oral, Q6H PRN **OR** ondansetron (ZOFRAN) injection 4 mg, 4 mg, Intravenous, Q6H PRN  heparin (porcine) injection 5,000 Units, 5,000 Units, Subcutaneous, 3 times per day  cefepime (MAXIPIME) 1 g IVPB minibag, 1 g, Intravenous, Q12H  metoprolol succinate (TOPROL XL) extended release tablet 25 mg, 25 mg, Oral, Daily  mirtazapine (REMERON) tablet 45 mg, 45 mg, Oral, Nightly  QUEtiapine (SEROQUEL) tablet 100 mg, 100 mg, Oral, BID  pantoprazole (PROTONIX) tablet 40 mg, 40 mg, Oral, BID AC  vancomycin (VANCOCIN) intermittent dosing (placeholder), , Other, RX Placeholder  collagenase ointment, , Topical, BID    Allergies:  Patient has no known allergies. Social History:    Was at [employer] but is usally at home with the help of family. He was  for over 61 years and his wife  a year ago. Family History:       Problem Relation Age of Onset    Cancer Mother     Cancer Father     Cancer Sister        REVIEW OF SYSTEMS:    Review of systems not obtained due to patient factors - Patient in the COVID-19 unit and positive. Vitals:    Vitals:    20 0316   BP: (!) 121/59   Pulse: 65   Resp: 20   Temp: 98 °F (36.7 °C)   SpO2: 94%       PHYSICAL EXAM:  Due to the current efforts to prevent transmission of COVID-19 and also the need to preserve PPE for other caregivers, a face-to-face encounter with the patient was not performed. That being said, all relevant records and diagnostic tests were reviewed, including laboratory results and imaging. Please reference any relevant documentation elsewhere. Care will be coordinated with the primary service.      GENERAL:  HEENT: No cervical adenopathy, MM moist, PERRL  COR:  LUNGS:  ABD:  SKIN:  PSYCH:  NEURO: CN II-XII grossly intact    DATA:    CBC:   Lab Results   Component Value Date    WBC 5.7 08/23/2020    RBC 3.37 08/23/2020    HGB 9.1 08/23/2020    HCT 30.2 08/23/2020    MCV 89.6 08/23/2020    MCH 27.0 08/23/2020    MCHC 30.1 08/23/2020    RDW 14.3 08/23/2020     08/23/2020    MPV 9.4 08/23/2020     CMP:    Lab Results   Component Value Date     08/24/2020    K 4.6 08/24/2020     08/24/2020    CO2 23 08/24/2020    BUN 35 08/24/2020    CREATININE 1.3 08/24/2020    GFRAA >60 08/24/2020    LABGLOM 52 08/24/2020    GLUCOSE 116 08/24/2020    PROT 5.4 08/22/2020    PROT 6.4 10/24/2012    LABALBU 2.6 08/22/2020    CALCIUM 8.1 08/24/2020    BILITOT 0.5 08/22/2020    ALKPHOS 241 08/22/2020    AST 88 08/22/2020     08/22/2020     Albumin:    Lab Results   Component Value Date    LABALBU 2.6 08/22/2020     CT Head:  08/02/2020  1. Mild cerebral white matter chronic microvascular ischemic disease. 2. Moderate diffuse cerebral atrophy. IMPRESSION:    80year old male with COVID-19 positive and stage 3 sacral decubitus ulcer for Palliative Care encounter. I spoke with the patient's daughter, Shabana Mak, as the patient stated that she was the decision maker. Shabana Mak is an LPN and wanted to make sure that her father got treated appropriately so was concerned about changing his code status. She states that he does have advance directives from a long time ago and an DNR. He does not want intubation, CPR, or defibrillation. I will change the code status to reflect this. We will continue to follow for goals of care and family support.         Electronically signed by Adria Olmos MD on 8/24/2020 at 10:52 AM

## 2020-08-24 NOTE — PROGRESS NOTES
[address] [facility]  consulted by Dr. Pauline Gomes for monitoring and adjustment. Indication for treatment:COVID-19 pneumonia vs. decubitus ulcer  Goal trough: 15 mcg/mL     Pertinent Laboratory Values:   Temp Readings from Last 3 Encounters:   08/24/20 97.6 °F (36.4 °C) (Axillary)   08/13/20 98.6 °F (37 °C) (Oral)     Recent Labs     08/22/20  0700 08/23/20  0245   WBC 5.4 5.7     Recent Labs     08/22/20  0700 08/23/20  0245 08/24/20  0534   BUN 40* 35* 35*   CREATININE 1.5* 1.4* 1.3     Estimated Creatinine Clearance: 44 mL/min (based on SCr of 1.3 mg/dL). Intake/Output Summary (Last 24 hours) at 8/24/2020 1203  Last data filed at 8/24/2020 0140  Gross per 24 hour   Intake 120 ml   Output 3300 ml   Net -3180 ml       Pertinent Cultures:  Date    Source    Results  8/21   Urine    NGTD  8/21   Covid-19   Positive  8/21   Resp panel   Ordered  8/21   Blood    NGTD  8/21   MRSA screen   Negative    Vancomycin level:   TROUGH:  No results for input(s): VANCOTROUGH in the last 72 hours. RANDOM:    Recent Labs     08/22/20  0700 08/23/20  0245 08/24/20  0534   VANCORANDOM 8.7 13.5 14.7     Assessment:  · WBC and temperature: WBC WNL, afebrile  · SCr, BUN, and urine output: RONALDO improving  · Day(s) of therapy: 4  · PCT trending down  · Vancomycin level: 14.7, ok to re-dose    Plan:  · Previously intermittent dosing based on levels given RONALDO  · Renal trends improving and patient receiving vancomycin 1500 mg ~q24h  · Plan to schedule vancomycin 1500 mg IVPB q24h and check a true trough level in 48h  · Pharmacy will continue to monitor patient and adjust therapy as indicated    [address] [facility] 8/26 @ 0077    Thank you for the consult.   Trang Mcfadden, [address]  8/24/2020 12:03 PM

## 2020-08-24 NOTE — CONSULTS
Comprehensive Nutrition Assessment    Type and Reason for Visit:  Consult    Nutrition Recommendations/Plan:   · Continue current diet and supplements  · Pt will likely need an NG or PEG depending on goals of care    Nutrition Assessment:  RD consulted for supplement recommendations 2/2 to pt wounds. RD assessment on 8/21 provided wound healing supplements, however, the pt appears to be refusing to eat. Should the pt want to remain a full code, he will likely need an NG or PEG to help meet his needs    Malnutrition Assessment:  Malnutrition Status:  Insufficient data    Context:  Social/Environmental Circumstances       Estimated Daily Nutrient Needs:  Energy (kcal):  1667-4960; Weight Used for Energy Requirements:  Current     Protein (g):  105; Weight Used for Protein Requirements:  Ideal        Fluid (ml/day):  0454-4969; Weight Used for Fluid Requirements:  Current      Nutrition Related Findings:  None      Wounds:  Multiple, Pressure Ulcer, Unstageable, Stage II       Current Nutrition Therapies:    DIET CARDIAC; Dietary Nutrition Supplements: Wound Healing Oral Supplement    Anthropometric Measures:  · Height: 5' 8\" (172.7 cm)  · Current Body Weight: 200 lb (90.7 kg)   · Admission Body Weight: 200 lb (90.7 kg)    · Usual Body Weight: 196 lb (88.9 kg)     · Ideal Body Weight: 154 lbs; % Ideal Body Weight 126.6 %   · BMI: 30.4  · BMI Categories: Overweight (BMI 25.0-29. 9)       Nutrition Diagnosis:   · Inadequate energy intake related to acute injury/trauma as evidenced by intake 0-25%, wounds      Nutrition Interventions:   Food and/or Nutrient Delivery:  Continue Current Diet, Continue Oral Nutrition Supplement, Start Tube Feeding  Nutrition Education/Counseling:  No recommendation at this time   Coordination of Nutrition Care:  Continued Inpatient Monitoring    Goals:  pt will consume greater than 75% of his meals and supplements       Nutrition Monitoring and Evaluation:   Food/Nutrient Intake Outcomes:  Food and Nutrient Intake, Supplement Intake  Physical Signs/Symptoms Outcomes:  Biochemical Data, Skin, Weight     Discharge Planning:     Too soon to determine     Electronically signed by Barbara Seymour RD, FAY on 3/00/93 at 2:06 PM EDT    Contact: [phone]

## 2020-08-25 LAB
ANION GAP SERPL CALCULATED.3IONS-SCNC: 9 MMOL/L (ref 4–16)
BASOPHILS ABSOLUTE: 0 K/CU MM
BASOPHILS RELATIVE PERCENT: 0.2 % (ref 0–1)
BUN BLDV-MCNC: 43 MG/DL (ref 6–23)
CALCIUM SERPL-MCNC: 8.6 MG/DL (ref 8.3–10.6)
CHLORIDE BLD-SCNC: 103 MMOL/L (ref 99–110)
CO2: 24 MMOL/L (ref 21–32)
CREAT SERPL-MCNC: 1.3 MG/DL (ref 0.9–1.3)
DIFFERENTIAL TYPE: ABNORMAL
EOSINOPHILS ABSOLUTE: 0 K/CU MM
EOSINOPHILS RELATIVE PERCENT: 0 % (ref 0–3)
GFR AFRICAN AMERICAN: >60 ML/MIN/1.73M2
GFR NON-AFRICAN AMERICAN: 52 ML/MIN/1.73M2
GLUCOSE BLD-MCNC: 120 MG/DL (ref 70–99)
HCT VFR BLD CALC: 29.2 % (ref 42–52)
HEMOGLOBIN: 8.8 GM/DL (ref 13.5–18)
IMMATURE NEUTROPHIL %: 0.9 % (ref 0–0.43)
LYMPHOCYTES ABSOLUTE: 1 K/CU MM
LYMPHOCYTES RELATIVE PERCENT: 15 % (ref 24–44)
MCH RBC QN AUTO: 26.5 PG (ref 27–31)
MCHC RBC AUTO-ENTMCNC: 30.1 % (ref 32–36)
MCV RBC AUTO: 88 FL (ref 78–100)
MONOCYTES ABSOLUTE: 0.6 K/CU MM
MONOCYTES RELATIVE PERCENT: 8.3 % (ref 0–4)
NUCLEATED RBC %: 0 %
PDW BLD-RTO: 13.8 % (ref 11.7–14.9)
PLATELET # BLD: 121 K/CU MM (ref 140–440)
PMV BLD AUTO: 11.2 FL (ref 7.5–11.1)
POTASSIUM SERPL-SCNC: 4.8 MMOL/L (ref 3.5–5.1)
RBC # BLD: 3.32 M/CU MM (ref 4.6–6.2)
SEGMENTED NEUTROPHILS ABSOLUTE COUNT: 5 K/CU MM
SEGMENTED NEUTROPHILS RELATIVE PERCENT: 75.6 % (ref 36–66)
SODIUM BLD-SCNC: 136 MMOL/L (ref 135–145)
TOTAL IMMATURE NEUTOROPHIL: 0.06 K/CU MM
TOTAL NUCLEATED RBC: 0 K/CU MM
WBC # BLD: 6.6 K/CU MM (ref 4–10.5)

## 2020-08-25 PROCEDURE — 94150 VITAL CAPACITY TEST: CPT

## 2020-08-25 PROCEDURE — 6360000002 HC RX W HCPCS: Performed by: INTERNAL MEDICINE

## 2020-08-25 PROCEDURE — 85025 COMPLETE CBC W/AUTO DIFF WBC: CPT

## 2020-08-25 PROCEDURE — 80048 BASIC METABOLIC PNL TOTAL CA: CPT

## 2020-08-25 PROCEDURE — 1200000000 HC SEMI PRIVATE

## 2020-08-25 PROCEDURE — 99223 1ST HOSP IP/OBS HIGH 75: CPT | Performed by: INTERNAL MEDICINE

## 2020-08-25 PROCEDURE — 94761 N-INVAS EAR/PLS OXIMETRY MLT: CPT

## 2020-08-25 PROCEDURE — 2580000003 HC RX 258: Performed by: INTERNAL MEDICINE

## 2020-08-25 PROCEDURE — 6370000000 HC RX 637 (ALT 250 FOR IP): Performed by: INTERNAL MEDICINE

## 2020-08-25 RX ADMIN — COLLAGENASE SANTYL: 250 OINTMENT TOPICAL at 08:48

## 2020-08-25 RX ADMIN — VANCOMYCIN HYDROCHLORIDE 1500 MG: 5 INJECTION, POWDER, LYOPHILIZED, FOR SOLUTION INTRAVENOUS at 13:07

## 2020-08-25 RX ADMIN — MIRTAZAPINE 45 MG: 15 TABLET, FILM COATED ORAL at 21:32

## 2020-08-25 RX ADMIN — QUETIAPINE FUMARATE 100 MG: 100 TABLET ORAL at 21:32

## 2020-08-25 RX ADMIN — FERROUS SULFATE TAB 325 MG (65 MG ELEMENTAL FE) 325 MG: 325 (65 FE) TAB at 16:05

## 2020-08-25 RX ADMIN — PANTOPRAZOLE SODIUM 40 MG: 40 TABLET, DELAYED RELEASE ORAL at 16:05

## 2020-08-25 RX ADMIN — DEXAMETHASONE 6 MG: 4 TABLET ORAL at 08:46

## 2020-08-25 RX ADMIN — HEPARIN SODIUM 5000 UNITS: 5000 INJECTION, SOLUTION INTRAVENOUS; SUBCUTANEOUS at 16:06

## 2020-08-25 RX ADMIN — SODIUM CHLORIDE, PRESERVATIVE FREE 10 ML: 5 INJECTION INTRAVENOUS at 08:46

## 2020-08-25 RX ADMIN — HEPARIN SODIUM 5000 UNITS: 5000 INJECTION, SOLUTION INTRAVENOUS; SUBCUTANEOUS at 06:38

## 2020-08-25 RX ADMIN — SODIUM CHLORIDE, PRESERVATIVE FREE 10 ML: 5 INJECTION INTRAVENOUS at 21:32

## 2020-08-25 RX ADMIN — FERROUS SULFATE TAB 325 MG (65 MG ELEMENTAL FE) 325 MG: 325 (65 FE) TAB at 08:46

## 2020-08-25 RX ADMIN — CEFEPIME HYDROCHLORIDE 1 G: 1 INJECTION, POWDER, FOR SOLUTION INTRAMUSCULAR; INTRAVENOUS at 16:05

## 2020-08-25 RX ADMIN — HEPARIN SODIUM 5000 UNITS: 5000 INJECTION, SOLUTION INTRAVENOUS; SUBCUTANEOUS at 21:32

## 2020-08-25 RX ADMIN — QUETIAPINE FUMARATE 100 MG: 100 TABLET ORAL at 08:46

## 2020-08-25 RX ADMIN — PANTOPRAZOLE SODIUM 40 MG: 40 TABLET, DELAYED RELEASE ORAL at 08:45

## 2020-08-25 RX ADMIN — CEFEPIME HYDROCHLORIDE 1 G: 1 INJECTION, POWDER, FOR SOLUTION INTRAMUSCULAR; INTRAVENOUS at 01:39

## 2020-08-25 ASSESSMENT — PAIN SCALES - GENERAL
PAINLEVEL_OUTOF10: 0

## 2020-08-25 ASSESSMENT — PAIN SCALES - WONG BAKER
WONGBAKER_NUMERICALRESPONSE: 0
WONGBAKER_NUMERICALRESPONSE: 0

## 2020-08-25 NOTE — PROGRESS NOTES
[address]    Hospitalist Progress Note      Name:  Ilana Benitez /Age/Sex: 1932  (80 y.o. male)   MRN & CSN:  [id number] & [id number] Admission Date/Time: 2020  1:17 AM   Location:  -A PCP: Chin Connolly MD         Hospital Day: 5    Assessment and Plan:   Ilana Benitez is a 80 y.o.  male  who presents with shortness of breath     Acute hypoxic respiratory failure due to COVID pneumonia - resolving     Was recently treated for community-acquired pneumonia and sent to SNF  Chest x-ray on admission shows bibasilar atelectasis   ECHO with normal LVEF, Leukocytosis trending down  SARS-COV2 detected on  - was undetected on   Trend procalcitonin and CRP  Encourage incentive spirometry  Saturating in the 90s on room air -started on steroids which improved O2 requirements     Sepsis likely due to sacral decubitus ulcers, unstageable, present on admission  S/p I and D at bedside     Blood cultures negative to date, surgical culture pending   Surgery on board  Wound ostomy to evaluate  On IV vancomycin and cefepime, dc vanc    Confirmed COVID -19 -     SARS-COV2 detected on  was not detected on  - off oxygen and on room air - LFT high for remdesevir      Acute kidney injury on CKD 3 -resolved       Elevated liver enzymes -Stable    Recent hepatitis panel negative  Recommend follow-up as outpatient with GI     Elevated d-dimer -VQ scan was done on admission, shows low probability of PE      Chronic issues    Hypertension  Depression  COPD, not in exacerbation  Moderate PCM   Anemia of chronic disease     CODE STATUS -full code, palliative care consult to discuss goals of care  I discussed with daughter Ronnell Cervantes who makes medical decisions, patient remains a full code, open to options of remdesivir and convalescent plasma if appropriate. Diet DIET CARDIAC;   Dietary Nutrition Supplements: Wound Healing Oral Supplement   DVT Prophylaxis [] Lovenox, []  Heparin, [] SCDs, []No VTE prophylaxis, patient ambulating   GI Prophylaxis [] PPI, [] H2 Blocker, [] No GI prophylaxis, patient is receiving diet/Tube Feeds   Code Status Full Code   Disposition Patient requires continued admission due to Sepsis/COVID   MDM [] Low, [x] Moderate,[]  High     History of Present Illness: Subjective     Patient Seen & Examined at the bedside     Patient is resting in bed with no distress while on room air - he is confused and disoriented and denies any discomfort or pain      Ten point ROS reviewed negative, unless as noted above    Objective: Intake/Output Summary (Last 24 hours) at 8/25/2020 1005  Last data filed at 8/25/2020 0151  Gross per 24 hour   Intake 553 ml   Output 2950 ml   Net -2397 ml      Vitals:   Vitals:    08/25/20 0820   BP: (!) 156/63   Pulse: 56   Resp: 24   Temp: 98.2 °F (36.8 °C)   SpO2: 94%     Physical Exam:    GEN Awake male, resting in bed in no apparent distress. Appears given age. RESP Clear to auscultation, no wheezes, rales or rhonchi. CARDIO/VASC -S1/S2 auscultated. Regular rate without appreciable murmurs, rubs, or gallops. Peripheral pulses equal bilaterally and palpable. No peripheral edema. GI Abdomen is soft without significant tenderness, masses, or guarding. Bowel sounds are normoactive. Rectal exam deferred.    SKIN Large decubitus -sacral stage IV   NEURO Confused and disoriented     Medications:   Medications:    vancomycin  1,500 mg Intravenous Q24H    dexamethasone  6 mg Oral Daily    ferrous sulfate  325 mg Oral BID WC    sodium chloride flush  10 mL Intravenous 2 times per day    heparin (porcine)  5,000 Units Subcutaneous 3 times per day    cefepime  1 g Intravenous Q12H    [Held by provider] metoprolol succinate  25 mg Oral Daily    mirtazapine  45 mg Oral Nightly    QUEtiapine  100 mg Oral BID    pantoprazole  40 mg Oral BID AC    collagenase   Topical BID      Infusions:   PRN Meds: sodium chloride flush, 10 mL, PRN  acetaminophen, 650 mg, Q6H PRN    Or  acetaminophen, 650 mg, Q6H PRN  polyethylene glycol, 17 g, Daily PRN  promethazine, 12.5 mg, Q6H PRN    Or  ondansetron, 4 mg, Q6H PRN          Electronically signed by Minerva Velez MD on 8/25/2020 at 10:05 AM

## 2020-08-25 NOTE — PROGRESS NOTES
[facility]  consulted by Dr. Saundra Morris for monitoring and adjustment. Indication for treatment:COVID-19 pneumonia vs. decubitus ulcer  Goal trough: 15 mcg/mL     Pertinent Laboratory Values:   Temp Readings from Last 3 Encounters:   08/25/20 98.2 °F (36.8 °C) (Oral)   08/13/20 98.6 °F (37 °C) (Oral)     Recent Labs     08/23/20  0245 08/25/20  0500   WBC 5.7 6.6     Recent Labs     08/23/20  0245 08/24/20  0534 08/25/20  0500   BUN 35* 35* 43*   CREATININE 1.4* 1.3 1.3     Estimated Creatinine Clearance: 44 mL/min (based on SCr of 1.3 mg/dL). Intake/Output Summary (Last 24 hours) at 8/25/2020 1315  Last data filed at 8/25/2020 0151  Gross per 24 hour   Intake 553 ml   Output 2950 ml   Net -2397 ml       Pertinent Cultures:  Date    Source    Results  8/21   Urine    NGTD  8/21   Covid-19   Positive  8/21   Resp panel   Ordered  8/21   Blood    NGTD  8/21   MRSA screen   Negative    Vancomycin level:   TROUGH:  No results for input(s): VANCOTROUGH in the last 72 hours. RANDOM:    Recent Labs     08/23/20  0245 08/24/20  0534   VANCORANDOM 13.5 14.7     Assessment:  · WBC and temperature: WBC WNL, afebrile  · SCr, BUN, and urine output: RONALDO improving  · Day(s) of therapy: 5  · PCT trending down  · Vancomycin level: 14.7, ok to re-dose    Plan:  · Previously intermittent dosing based on levels given RONALDO  · Renal trends improving and patient receiving vancomycin 1500 mg ~q24h  · Scheduled vancomycin 1500 mg q24h   · Pharmacy will continue to monitor patient and adjust therapy as indicated    [address] [facility] 8/26 @ 2074    Thank you for the consult.   Judy Snyder RPh  8/25/2020 1:15 PM

## 2020-08-25 NOTE — PROGRESS NOTES
Pt incontinent of bm, pt cleaned and bedding changed. Wound dressing changed per order. Pt tolerated changing well. Pt able to roll self side to side to assist nurse with change. Pt not as confused while conversing with nurse. Pt able to feed self.

## 2020-08-25 NOTE — CONSULTS
Infectious Disease Consult Note  2020   Patient Name: Trudi Goldstein : 1932   Impression   COVID-19 pneumonia with respiratory failure  Date of symptom onset: 2020  Date of positive COVID-19 PCR: 2020  Exposure: [place] [facility], long-term care facility  Oxygen supplementation: Nasal oxygen  Bacterial infection: May have had superinfection post bacterial infection, procalcitonin was elevated but this could also be explained by acute kidney injury. In any case 5 days of antibiotics will suffice.  Acute kidney injury on CKD stage III   Stage III sacral decubitus ulcer present on admission   Multi-morbidity: per PMHx  Plan:   Therapeutic: Discontinue vancomycin and cefepime. Continue dexamethasone   Diagnostic: CRP and procalcitonin   F/u test:     Thank you for allowing me to consult in the care of this patient.  ------------------------  REASON FOR CONSULT: Infective syndrome   Requested by: Dr. Majo Ovalle is a 80 y.o.  male [facility] who was admitted 2020 for further evaluation and management of hypoxia. As per EMS the patient was recently diagnosed with pneumonia and started on levofloxacin 1 day prior to admission. He was found to have oxygen saturations in the 70s while on room air. Was admitted with a clinical diagnosis of acute hypoxic respiratory failure, possible pneumonia. Empiric vancomycin and cefepime were started. Tested positive for SARS-CoV-2 on 2020. Has improved. ? Infectious diseases service was consulted to evaluate the pt, and recommend further investigative and therapeutic measures. Review and summary of old records:  ROS: Other systems reviewed Including eyes, ENT, respiratory, cardiovascular, GI, , dermatologic, neurologic, psych, hem/lymphatic, musculoskeletal and endocrine were negative other than what is mentioned above.    Unable to obtain; pt has dementia  Patient Active Problem List    Diagnosis Date Noted    Stage state of repair  Eyes: PERRLA, EOMI, conjunctiva pink, sclera anicteric. Neck: Supple. Trachea midline. No LAD  Heart and chest: Deferred as use of PAPR does not allow for auscultation. Abd: soft, non-distended, no tenderness, no hepatomegaly. Normoactive bowel sounds. Ext: no clubbing, cyanosis, or edema  Catheter Site: without erythema or tenderness  Neuro: Mental status intact    ? Diagnostic Studies: reviewed  ? ? I have examined this patient and available medical records on this date and have made the above observations, conclusions and recommendations.   Electronically signed by: Electronically signed by Kailey Chiu MD on 8/25/2020 at 2:39 PM

## 2020-08-26 PROBLEM — J12.82 PNEUMONIA DUE TO COVID-19 VIRUS: Status: ACTIVE | Noted: 2020-08-26

## 2020-08-26 PROBLEM — U07.1 PNEUMONIA DUE TO COVID-19 VIRUS: Status: ACTIVE | Noted: 2020-08-26

## 2020-08-26 LAB
ANION GAP SERPL CALCULATED.3IONS-SCNC: 10 MMOL/L (ref 4–16)
BASOPHILS ABSOLUTE: 0 K/CU MM
BASOPHILS RELATIVE PERCENT: 0.1 % (ref 0–1)
BUN BLDV-MCNC: 43 MG/DL (ref 6–23)
CALCIUM SERPL-MCNC: 8.9 MG/DL (ref 8.3–10.6)
CHLORIDE BLD-SCNC: 104 MMOL/L (ref 99–110)
CO2: 25 MMOL/L (ref 21–32)
CREAT SERPL-MCNC: 1.2 MG/DL (ref 0.9–1.3)
CULTURE: NORMAL
CULTURE: NORMAL
DIFFERENTIAL TYPE: ABNORMAL
EOSINOPHILS ABSOLUTE: 0 K/CU MM
EOSINOPHILS RELATIVE PERCENT: 0.3 % (ref 0–3)
GFR AFRICAN AMERICAN: >60 ML/MIN/1.73M2
GFR NON-AFRICAN AMERICAN: 57 ML/MIN/1.73M2
GLUCOSE BLD-MCNC: 97 MG/DL (ref 70–99)
HCT VFR BLD CALC: 30 % (ref 42–52)
HEMOGLOBIN: 9.4 GM/DL (ref 13.5–18)
HIGH SENSITIVE C-REACTIVE PROTEIN: 15.8 MG/L
IMMATURE NEUTROPHIL %: 2.3 % (ref 0–0.43)
LYMPHOCYTES ABSOLUTE: 1.2 K/CU MM
LYMPHOCYTES RELATIVE PERCENT: 17.6 % (ref 24–44)
Lab: NORMAL
Lab: NORMAL
MCH RBC QN AUTO: 27.2 PG (ref 27–31)
MCHC RBC AUTO-ENTMCNC: 31.3 % (ref 32–36)
MCV RBC AUTO: 86.7 FL (ref 78–100)
MONOCYTES ABSOLUTE: 0.7 K/CU MM
MONOCYTES RELATIVE PERCENT: 9.7 % (ref 0–4)
NUCLEATED RBC %: 0 %
PDW BLD-RTO: 13.8 % (ref 11.7–14.9)
PLATELET # BLD: 130 K/CU MM (ref 140–440)
PMV BLD AUTO: 10.5 FL (ref 7.5–11.1)
POTASSIUM SERPL-SCNC: 4.5 MMOL/L (ref 3.5–5.1)
PROCALCITONIN: 0.15
RBC # BLD: 3.46 M/CU MM (ref 4.6–6.2)
SEGMENTED NEUTROPHILS ABSOLUTE COUNT: 4.9 K/CU MM
SEGMENTED NEUTROPHILS RELATIVE PERCENT: 70 % (ref 36–66)
SODIUM BLD-SCNC: 139 MMOL/L (ref 135–145)
SPECIMEN: NORMAL
SPECIMEN: NORMAL
TOTAL IMMATURE NEUTOROPHIL: 0.16 K/CU MM
TOTAL NUCLEATED RBC: 0 K/CU MM
WBC # BLD: 6.9 K/CU MM (ref 4–10.5)

## 2020-08-26 PROCEDURE — 2580000003 HC RX 258: Performed by: INTERNAL MEDICINE

## 2020-08-26 PROCEDURE — 80048 BASIC METABOLIC PNL TOTAL CA: CPT

## 2020-08-26 PROCEDURE — 1200000000 HC SEMI PRIVATE

## 2020-08-26 PROCEDURE — 6370000000 HC RX 637 (ALT 250 FOR IP): Performed by: INTERNAL MEDICINE

## 2020-08-26 PROCEDURE — 6360000002 HC RX W HCPCS: Performed by: INTERNAL MEDICINE

## 2020-08-26 PROCEDURE — 86141 C-REACTIVE PROTEIN HS: CPT

## 2020-08-26 PROCEDURE — 85025 COMPLETE CBC W/AUTO DIFF WBC: CPT

## 2020-08-26 PROCEDURE — 84145 PROCALCITONIN (PCT): CPT

## 2020-08-26 PROCEDURE — 94761 N-INVAS EAR/PLS OXIMETRY MLT: CPT

## 2020-08-26 PROCEDURE — 99232 SBSQ HOSP IP/OBS MODERATE 35: CPT | Performed by: INTERNAL MEDICINE

## 2020-08-26 RX ADMIN — SODIUM CHLORIDE, PRESERVATIVE FREE 10 ML: 5 INJECTION INTRAVENOUS at 21:07

## 2020-08-26 RX ADMIN — QUETIAPINE FUMARATE 100 MG: 100 TABLET ORAL at 08:18

## 2020-08-26 RX ADMIN — PANTOPRAZOLE SODIUM 40 MG: 40 TABLET, DELAYED RELEASE ORAL at 15:02

## 2020-08-26 RX ADMIN — HEPARIN SODIUM 5000 UNITS: 5000 INJECTION, SOLUTION INTRAVENOUS; SUBCUTANEOUS at 22:47

## 2020-08-26 RX ADMIN — MIRTAZAPINE 45 MG: 15 TABLET, FILM COATED ORAL at 21:07

## 2020-08-26 RX ADMIN — COLLAGENASE SANTYL: 250 OINTMENT TOPICAL at 15:03

## 2020-08-26 RX ADMIN — HEPARIN SODIUM 5000 UNITS: 5000 INJECTION, SOLUTION INTRAVENOUS; SUBCUTANEOUS at 15:04

## 2020-08-26 RX ADMIN — FERROUS SULFATE TAB 325 MG (65 MG ELEMENTAL FE) 325 MG: 325 (65 FE) TAB at 08:19

## 2020-08-26 RX ADMIN — FERROUS SULFATE TAB 325 MG (65 MG ELEMENTAL FE) 325 MG: 325 (65 FE) TAB at 17:03

## 2020-08-26 RX ADMIN — DEXAMETHASONE 6 MG: 4 TABLET ORAL at 08:18

## 2020-08-26 RX ADMIN — PANTOPRAZOLE SODIUM 40 MG: 40 TABLET, DELAYED RELEASE ORAL at 05:34

## 2020-08-26 RX ADMIN — COLLAGENASE SANTYL: 250 OINTMENT TOPICAL at 04:18

## 2020-08-26 RX ADMIN — SODIUM CHLORIDE, PRESERVATIVE FREE 10 ML: 5 INJECTION INTRAVENOUS at 08:19

## 2020-08-26 RX ADMIN — HEPARIN SODIUM 5000 UNITS: 5000 INJECTION, SOLUTION INTRAVENOUS; SUBCUTANEOUS at 05:34

## 2020-08-26 RX ADMIN — QUETIAPINE FUMARATE 100 MG: 100 TABLET ORAL at 21:07

## 2020-08-26 ASSESSMENT — PAIN SCALES - WONG BAKER
WONGBAKER_NUMERICALRESPONSE: 0
WONGBAKER_NUMERICALRESPONSE: 0

## 2020-08-26 ASSESSMENT — PAIN SCALES - GENERAL
PAINLEVEL_OUTOF10: 0

## 2020-08-26 NOTE — CONSULTS
Department of GeneralSurgery   Surgical Service Dr Garcia Solorzano   Consult Note    Date of Consult: 8/11/20    Reason for Consult: Abnormal LFTs and sacral decubitus wound  Requesting Physician:  Dr Diamond Snider: Abdominal pain    History Obtained From:  patient    HISTORY OF PRESENT ILLNESS:                The patient is a 80 y.o. male who presents with dementia was admitted with pneumonia was noted to have elevated LFTs. Ultrasound was obtained on the 6 and showed cholelithiasis with sludge. The common bile duct was in the upper limit of 6 mm. Subsequently MRCP was done and showed also cholelithiasis with a 2.4 cm hypodense lesion with central cystic component in the proximal duodenum. Patient has been seen by Dr. Marco Jose and is planned to have an esophagogastroduodenoscopy tomorrow. . Pain is described as cramping. Pain level is 4/10. The sacral wound has been there for several days. It has some red tissue and unstageable at this time. Past Medical History:    Past Medical History:   Diagnosis Date    Anxiety     Depression     Hypertension     Nerves        Past Surgical History:    Past Surgical History:   Procedure Laterality Date    JOINT REPLACEMENT      right hip    UPPER GASTROINTESTINAL ENDOSCOPY N/A 8/12/2020    EGD BIOPSY performed by Rico Crews MD at [facility] ENDOSCOPY       Current Medications:   No current facility-administered medications for this encounter. No current outpatient medications on file.      Facility-Administered Medications Ordered in Other Encounters   Medication Dose Route Frequency Provider Last Rate Last Dose    collagenase ointment   Topical BID Eladia Carver MD        dexamethasone (DECADRON) tablet 6 mg  6 mg Oral Daily Eladia Carver MD   6 mg at 08/26/20 0818    ferrous sulfate (IRON 325) tablet 325 mg  325 mg Oral BID  Eladia Carver MD   325 mg at 08/26/20 0819    sodium chloride flush 0.9 % injection 10 mL  10 mL Intravenous 2 times per day Comment: used to drimk on weekends stopped 2 years ago    Drug use: No    Sexual activity: Not on file   Lifestyle    Physical activity     Days per week: Not on file     Minutes per session: Not on file    Stress: Not on file   Relationships    Social connections     Talks on phone: Not on file     Gets together: Not on file     Attends Zoroastrianism service: Not on file     Active member of club or organization: Not on file     Attends meetings of clubs or organizations: Not on file     Relationship status: Not on file    Intimate partner violence     Fear of current or ex partner: Not on file     Emotionally abused: Not on file     Physically abused: Not on file     Forced sexual activity: Not on file   Other Topics Concern    Not on file   Social History Narrative    Not on file       Family History:   Family History   Problem Relation Age of Onset    Cancer Mother     Cancer Father     Cancer Sister        REVIEW OFSYSTEMS:    Review of Systems   Unable to perform ROS: Dementia   Skin: Positive for wound. PHYSICAL EXAM:  Vitals:    08/13/20 1545 08/13/20 2047 08/13/20 2112 08/13/20 2115   BP: 123/65 105/65     Pulse: 81 74     Resp: 14 16     Temp: 98.7 °F (37.1 °C) 98.6 °F (37 °C)     TempSrc: Axillary Oral     SpO2:  93% 94% 94%   Weight:       Height:           Physical Exam  Constitutional:       Appearance: He is well-developed. HENT:      Head: Normocephalic. Eyes:      Pupils: Pupils are equal, round, and reactive to light. Neck:      Musculoskeletal: Normal range of motion and neck supple. Cardiovascular:      Rate and Rhythm: Normal rate. Pulmonary:      Effort: Pulmonary effort is normal.   Abdominal:      General: There is no distension. Palpations: Abdomen is soft. There is no mass. Tenderness: There is no abdominal tenderness. There is no guarding or rebound. Musculoskeletal: Normal range of motion. Back:    Skin:     General: Skin is warm. Neurological:      Mental Status: He is alert. Mental status is at baseline. DATA:    CBC with Differential:    Lab Results   Component Value Date    WBC 6.9 08/26/2020    RBC 3.46 08/26/2020    HGB 9.4 08/26/2020    HCT 30.0 08/26/2020     08/26/2020    MCV 86.7 08/26/2020    MCH 27.2 08/26/2020    MCHC 31.3 08/26/2020    RDW 13.8 08/26/2020    SEGSPCT 70.0 08/26/2020    BANDSPCT 4 08/02/2020    LYMPHOPCT 17.6 08/26/2020    MONOPCT 9.7 08/26/2020    EOSPCT 5.3 11/11/2011    BASOPCT 0.1 08/26/2020    MONOSABS 0.7 08/26/2020    LYMPHSABS 1.2 08/26/2020    EOSABS 0.0 08/26/2020    BASOSABS 0.0 08/26/2020    DIFFTYPE AUTOMATED DIFFERENTIAL 08/26/2020     CMP:    Lab Results   Component Value Date     08/26/2020    K 4.5 08/26/2020     08/26/2020    CO2 25 08/26/2020    BUN 43 08/26/2020    CREATININE 1.2 08/26/2020    GFRAA >60 08/26/2020    LABGLOM 57 08/26/2020    GLUCOSE 97 08/26/2020    PROT 5.4 08/22/2020    PROT 6.4 10/24/2012    LABALBU 2.6 08/22/2020    CALCIUM 8.9 08/26/2020    BILITOT 0.5 08/22/2020    ALKPHOS 241 08/22/2020    AST 88 08/22/2020     08/22/2020       IMPRESSION:        Patient Active Problem List:     Pneumonia due to organism     ARF (acute respiratory failure) ([facility] [address]     Essential hypertension, benign     Depressive disorder, not elsewhere classified     Acute renal failure (HCC)     Metabolic encephalopathy     SOB (shortness of breath)     Elevated liver enzymes     Acute respiratory failure (HCC)     Stage III pressure ulcer of sacral region [facility])      PLAN:    We will follow esophagogastroduodenoscopy result. I do not think he is having abdominal pain related to his gallstones. There is no gallbladder wall thickening on ultrasound. Therefore we will hold off on gallbladder surgery for now. Sacral wound appears to be superficial and will hold off on debridement for now.   Will use Santyl for chemical debridement and daily dressing changes.         Marty Lomas MD

## 2020-08-26 NOTE — PROGRESS NOTES
Feeds   Code Status Limited   Disposition Patient requires continued admission due to Sepsis/COVID   MDM [] Low, [x] Moderate,[]  High     History of Present Illness: Subjective     Patient Seen & Examined at the bedside     Patient is resting in bed with no distress - confused and disoriented -   States he has some soreness at his buttocks     Ten point ROS reviewed negative, unless as noted above    Objective: Intake/Output Summary (Last 24 hours) at 8/26/2020 0728  Last data filed at 8/26/2020 0235  Gross per 24 hour   Intake --   Output 3750 ml   Net -3750 ml      Vitals:   Vitals:    08/26/20 0326   BP: (!) 147/74   Pulse: 56   Resp: 12   Temp: 98.2 °F (36.8 °C)   SpO2: 98%     Physical Exam:    GEN Awake male, resting in bed in no apparent distress. Appears given age. RESP Clear to auscultation, no wheezes, rales or rhonchi. CARDIO/VASC -S1/S2 auscultated. Regular rate without appreciable murmurs, rubs, or gallops. Peripheral pulses equal bilaterally and palpable. No peripheral edema. GI Abdomen is soft without significant tenderness, masses, or guarding. Bowel sounds are normoactive. Rectal exam deferred.    SKIN Large decubitus -sacral stage IV   NEURO Confused and disoriented     Medications:   Medications:    collagenase   Topical BID    dexamethasone  6 mg Oral Daily    ferrous sulfate  325 mg Oral BID WC    sodium chloride flush  10 mL Intravenous 2 times per day    heparin (porcine)  5,000 Units Subcutaneous 3 times per day    [Held by provider] metoprolol succinate  25 mg Oral Daily    mirtazapine  45 mg Oral Nightly    QUEtiapine  100 mg Oral BID    pantoprazole  40 mg Oral BID AC      Infusions:   PRN Meds: sodium chloride flush, 10 mL, PRN  acetaminophen, 650 mg, Q6H PRN    Or  acetaminophen, 650 mg, Q6H PRN  polyethylene glycol, 17 g, Daily PRN  promethazine, 12.5 mg, Q6H PRN    Or  ondansetron, 4 mg, Q6H PRN          Electronically signed by Amarilis Oliver MD on 8/26/2020 at

## 2020-08-26 NOTE — PROGRESS NOTES
Infectious Disease Progress Note  2020   Patient Name: Jerald Barakat : 1932       Reason for visit: F/u COVID-19, stage IV sacral decubitus ulcer ? History:? Interval history noted  Dementia, denies any complaints. Physical Exam:  Vital Signs: BP (!) 140/66   Pulse 65   Temp 98.3 °F (36.8 °C) (Oral)   Resp 22   Ht 5' 8\" (1.727 m)   Wt 201 lb (91.2 kg)   SpO2 97%   BMI 30.56 kg/m²     Gen: alert and oriented X3, no distress  Skin: no stigmata of endocarditis  Wounds: Stage IV sacral decubitus ulcer, with areas of necrosis   HEMT: AT/NC Oropharynx pink, moist, and without lesions or exudates; dentition in good state of repair  Eyes: PERRLA, EOMI, conjunctiva pink, sclera anicteric. Neck: Supple. Trachea midline. No LAD. Chest: no distress and CTA. Good air movement. Heart: RRR and no MRG. Abd: soft, non-distended, no tenderness, no hepatomegaly. Normoactive bowel sounds. Ext: no clubbing, cyanosis, or edema  Catheter Site: without erythema or tenderness  Neuro: Mental status intact. CN 2-12 intact and no focal sensory or motor deficits     Radiologic / Imaging / TESTING  No results found.      Labs:    Recent Results (from the past 24 hour(s))   Basic Metabolic Panel w/ Reflex to MG    Collection Time: 20  4:45 AM   Result Value Ref Range    Sodium 139 135 - 145 MMOL/L    Potassium 4.5 3.5 - 5.1 MMOL/L    Chloride 104 99 - 110 mMol/L    CO2 25 21 - 32 MMOL/L    Anion Gap 10 4 - 16    BUN 43 (H) 6 - 23 MG/DL    CREATININE 1.2 0.9 - 1.3 MG/DL    Glucose 97 70 - 99 MG/DL    Calcium 8.9 8.3 - 10.6 MG/DL    GFR Non- 57 (L) >60 mL/min/1.73m2    GFR African American >60 >60 mL/min/1.73m2   CBC auto differential    Collection Time: 20  4:45 AM   Result Value Ref Range    WBC 6.9 4.0 - 10.5 K/CU MM    RBC 3.46 (L) 4.6 - 6.2 M/CU MM    Hemoglobin 9.4 (L) 13.5 - 18.0 GM/DL    Hematocrit 30.0 (L) 42 - 52 %    MCV 86.7 78 - 100 FL    MCH 27.2 27 - 31 PG    MCHC 31.3 (L) 32.0 - 36.0 %    RDW 13.8 11.7 - 14.9 %    Platelets 606 (L) 478 - 440 K/CU MM    MPV 10.5 7.5 - 11.1 FL    Differential Type AUTOMATED DIFFERENTIAL     Segs Relative 70.0 (H) 36 - 66 %    Lymphocytes % 17.6 (L) 24 - 44 %    Monocytes % 9.7 (H) 0 - 4 %    Eosinophils % 0.3 0 - 3 %    Basophils % 0.1 0 - 1 %    Segs Absolute 4.9 K/CU MM    Lymphocytes Absolute 1.2 K/CU MM    Monocytes Absolute 0.7 K/CU MM    Eosinophils Absolute 0.0 K/CU MM    Basophils Absolute 0.0 K/CU MM    Nucleated RBC % 0.0 %    Total Nucleated RBC 0.0 K/CU MM    Total Immature Neutrophil 0.16 K/CU MM    Immature Neutrophil % 2.3 (H) 0 - 0.43 %   Procalcitonin    Collection Time: 08/26/20  4:45 AM   Result Value Ref Range    Procalcitonin 0.150    C-reactive protein    Collection Time: 08/26/20  4:45 AM   Result Value Ref Range    CRP, High Sensitivity 15.8 mg/L     CULTURE results: Invalid input(s): BLOOD CULTURE,  URINE CULTURE, SURGICAL CULTURE    Diagnosis:  Patient Active Problem List   Diagnosis    Pneumonia due to organism    [facility]F [facility]acute respiratory failure) ([facility] [address]    Essential hypertension, benign    Depressive disorder, not elsewhere classified    Acute renal failure (HCC)    Metabolic encephalopathy    SOB (shortness of breath)    Elevated liver enzymes    Acute respiratory failure (HCC)    Stage III pressure ulcer of sacral region ([address]    Pneumonia due to COVID-19 virus       Active Problems  Active Problems:    Acute respiratory failure (HCC)    Stage III pressure ulcer of sacral region ([facility] [address]    Pneumonia due to COVID-19 virus  Resolved Problems:    * No resolved hospital problems.  *      Impression and plan   Clinical status: Improving   Therapeutic:discontinue dexamethasone   Diagnostic:   F/u:   Other:   Summary:      Electronically signed by: Electronically signed by Diana Alaniz MD on 8/26/2020 at 7:57 PM

## 2020-08-26 NOTE — PLAN OF CARE
Problem: Skin Integrity:  Goal: Will show no infection signs and symptoms  Description: Will show no infection signs and symptoms  Outcome: Ongoing  Goal: Absence of new skin breakdown  Description: Absence of new skin breakdown  Outcome: Ongoing     Problem: Airway Clearance - Ineffective  Goal: Achieve or maintain patent airway  Outcome: Ongoing     Problem: Gas Exchange - Impaired  Goal: Absence of hypoxia  Outcome: Ongoing  Goal: Promote optimal lung function  Outcome: Ongoing     Problem: Breathing Pattern - Ineffective  Goal: Ability to achieve and maintain a regular respiratory rate  Outcome: Ongoing     Problem:  Body Temperature -  Risk of, Imbalanced  Goal: Ability to maintain a body temperature within defined limits  Outcome: Ongoing  Goal: Will regain or maintain usual level of consciousness  Outcome: Ongoing  Goal: Complications related to the disease process, condition or treatment will be avoided or minimized  Outcome: Ongoing     Problem: Isolation Precautions - Risk of Spread of Infection  Goal: Prevent transmission of infection  Outcome: Ongoing     Problem: Nutrition Deficits  Goal: Optimize nutrtional status  Outcome: Ongoing     Problem: Risk for Fluid Volume Deficit  Goal: Maintain normal heart rhythm  Outcome: Ongoing  Goal: Maintain absence of muscle cramping  Outcome: Ongoing  Goal: Maintain normal serum potassium, sodium, calcium, phosphorus, and pH  Outcome: Ongoing     Problem: Loneliness or Risk for Loneliness  Goal: Demonstrate positive use of time alone when socialization is not possible  Outcome: Ongoing     Problem: Fatigue  Goal: Verbalize increase energy and improved vitality  Outcome: Ongoing     Problem: Patient Education: Go to Patient Education Activity  Goal: Patient/Family Education  Outcome: Ongoing     Problem: SAFETY  Goal: Free from accidental physical injury  Outcome: Ongoing  Goal: Free from intentional harm  Outcome: Ongoing     Problem: DAILY CARE  Goal: Daily care

## 2020-08-27 PROCEDURE — 6360000002 HC RX W HCPCS: Performed by: INTERNAL MEDICINE

## 2020-08-27 PROCEDURE — 94761 N-INVAS EAR/PLS OXIMETRY MLT: CPT

## 2020-08-27 PROCEDURE — 99231 SBSQ HOSP IP/OBS SF/LOW 25: CPT | Performed by: SURGERY

## 2020-08-27 PROCEDURE — 2580000003 HC RX 258: Performed by: INTERNAL MEDICINE

## 2020-08-27 PROCEDURE — 99211 OFF/OP EST MAY X REQ PHY/QHP: CPT

## 2020-08-27 PROCEDURE — 6370000000 HC RX 637 (ALT 250 FOR IP): Performed by: INTERNAL MEDICINE

## 2020-08-27 PROCEDURE — 80048 BASIC METABOLIC PNL TOTAL CA: CPT

## 2020-08-27 PROCEDURE — 1200000000 HC SEMI PRIVATE

## 2020-08-27 RX ADMIN — MIRTAZAPINE 45 MG: 15 TABLET, FILM COATED ORAL at 21:32

## 2020-08-27 RX ADMIN — COLLAGENASE SANTYL: 250 OINTMENT TOPICAL at 08:38

## 2020-08-27 RX ADMIN — QUETIAPINE FUMARATE 100 MG: 100 TABLET ORAL at 21:31

## 2020-08-27 RX ADMIN — FERROUS SULFATE TAB 325 MG (65 MG ELEMENTAL FE) 325 MG: 325 (65 FE) TAB at 08:57

## 2020-08-27 RX ADMIN — SODIUM CHLORIDE, PRESERVATIVE FREE 10 ML: 5 INJECTION INTRAVENOUS at 21:45

## 2020-08-27 RX ADMIN — HEPARIN SODIUM 5000 UNITS: 5000 INJECTION, SOLUTION INTRAVENOUS; SUBCUTANEOUS at 06:27

## 2020-08-27 RX ADMIN — SODIUM CHLORIDE, PRESERVATIVE FREE 10 ML: 5 INJECTION INTRAVENOUS at 08:57

## 2020-08-27 RX ADMIN — QUETIAPINE FUMARATE 100 MG: 100 TABLET ORAL at 08:57

## 2020-08-27 RX ADMIN — FERROUS SULFATE TAB 325 MG (65 MG ELEMENTAL FE) 325 MG: 325 (65 FE) TAB at 17:25

## 2020-08-27 RX ADMIN — PANTOPRAZOLE SODIUM 40 MG: 40 TABLET, DELAYED RELEASE ORAL at 06:27

## 2020-08-27 RX ADMIN — PANTOPRAZOLE SODIUM 40 MG: 40 TABLET, DELAYED RELEASE ORAL at 17:25

## 2020-08-27 RX ADMIN — HEPARIN SODIUM 5000 UNITS: 5000 INJECTION, SOLUTION INTRAVENOUS; SUBCUTANEOUS at 21:32

## 2020-08-27 RX ADMIN — COLLAGENASE SANTYL: 250 OINTMENT TOPICAL at 04:57

## 2020-08-27 ASSESSMENT — PAIN SCALES - GENERAL
PAINLEVEL_OUTOF10: 0
PAINLEVEL_OUTOF10: 0

## 2020-08-27 ASSESSMENT — PAIN SCALES - WONG BAKER
WONGBAKER_NUMERICALRESPONSE: 0
WONGBAKER_NUMERICALRESPONSE: 0

## 2020-08-27 NOTE — CONSULTS
[facility] Continence Nurse  Consult Note       Deloris Chávez  AGE: 80 y.o. GENDER: male  : 1932  TODAY'S DATE:  2020    Subjective:     Reason for  Evaluation and Assessment: wound reassessment      Deloris Chávez is a 80 y.o. male referred by:   [x] Physician  [] Nursing  [] Other:     Wound Identification:  Wound Type: pressure  Contributing Factors: chronic pressure, decreased mobility, decreased tissue oxygenation, malnutrition and incontinence of stool        PAST MEDICAL HISTORY        Diagnosis Date    Anxiety     Depression     Hypertension     Nerves        PAST SURGICAL HISTORY    Past Surgical History:   Procedure Laterality Date    JOINT REPLACEMENT      right hip    UPPER GASTROINTESTINAL ENDOSCOPY N/A 2020    EGD BIOPSY performed by Sina Batista MD at [facility]    Family History   Problem Relation Age of Onset    Cancer Mother     Cancer Father     Cancer Sister        SOCIAL HISTORY    Social History     Tobacco Use    Smoking status: Former Smoker     Last attempt to quit: 1974     Years since quittin.7    Smokeless tobacco: Current User     Types: Chew   Substance Use Topics    Alcohol use: No     Comment: used to drimk on weekends stopped 2 years ago    Drug use: No       ALLERGIES    No Known Allergies    MEDICATIONS    No current facility-administered medications on file prior to encounter. Current Outpatient Medications on File Prior to Encounter   Medication Sig Dispense Refill    pantoprazole (PROTONIX) 40 MG tablet Take 1 tablet by mouth 2 times daily (before meals) 30 tablet 3    furosemide (LASIX) 40 MG tablet Take 1 tablet by mouth daily for 3 days 3 tablet 0    cloNIDine (CATAPRES) 0.1 MG tablet Take 1 tablet by mouth every 6 hours as needed (for B/P systolic > 853). 60 tablet 3    QUEtiapine (SEROQUEL) 100 MG tablet Take 100 mg by mouth 2 times daily.       metoprolol (TOPROL-XL) 25 MG XL 08/27/20 0839   Dressing/Treatment Moist to dry 08/26/20 0326   Wound Cleansed Rinsed/Irrigated with saline 08/27/20 0839   Dressing Change Due 08/26/20 08/26/20 0326   Wound Length (cm) 9.5 cm 08/27/20 0839   Wound Width (cm) 11 cm 08/27/20 0839   Wound Depth (cm) 2.6 cm 08/27/20 0839   Wound Surface Area (cm^2) 104.5 cm^2 08/27/20 0839   Change in Wound Size % (l*w) -16.11 08/27/20 0839   Wound Volume (cm^3) 271.7 cm^3 08/27/20 0839   Wound Healing % -2919 08/27/20 0839   Distance Tunneling (cm) 0 cm 08/27/20 0839   Tunneling Position ___ O'Clock 0 08/27/20 0839   Undermining Starts ___ O'Clock 0 08/27/20 0839   Undermining Ends___ O'Clock 0 08/27/20 0839   Undermining Maxium Distance (cm) 0 08/27/20 0839   Wound Assessment Yellow;Red;Brown 08/27/20 0839   Drainage Amount Moderate 08/27/20 0839   Drainage Description Serosanguinous 08/27/20 0839   Odor None 08/27/20 0839   Margins Defined edges 08/27/20 0839   Kecia-wound Assessment Pink 08/27/20 0839   Non-staged Wound Description Full thickness 08/27/20 0839   [place]%Wound Bed 0 08/21/20 0920   Red%Wound Bed 50 08/27/20 0839   Yellow%Wound Bed 40 08/27/20 0839   Black%Wound Bed 0 08/21/20 0920   Purple%Wound Bed 20 08/21/20 0920   Other%Wound Bed 10 08/27/20 0839   Number of days: 16       Wound 08/21/20 Left lateral abdomen (Active)   Wound Pressure Stage  2 08/27/20 0839   Dressing Status Reinforced 08/27/20 0839   Dressing Changed Dressing reinforced 08/27/20 0839   Dressing/Treatment Tegaderm 08/24/20 1520   Wound Cleansed Rinsed/Irrigated with saline 08/27/20 0839   Wound Length (cm) 0 cm 08/27/20 0839   Wound Width (cm) 0 cm 08/27/20 0839   Wound Depth (cm) 0 cm 08/27/20 0839   Wound Surface Area (cm^2) 0 cm^2 08/27/20 0839   Change in Wound Size % (l*w) 100 08/27/20 0839   Wound Volume (cm^3) 0 cm^3 08/27/20 0839   Wound Healing % 100 08/27/20 0839   Distance Tunneling (cm) 0 cm 08/27/20 0839   Tunneling Position ___ O'Clock 0 08/27/20 0839   Undermining reinforcement.         Electronically signed by Elena Jaeger RN,  on 8/27/2020 at 10:44 AM

## 2020-08-27 NOTE — PROGRESS NOTES
GENERAL SURGERY PROGRESS NOTE    Jerald Barakat is a 80 y.o. male s/p sacral ulcer debridement on 8/23. Subjective:  Doing ok this morning. Pleasant. Objective:    Vitals: VITALS:  /75   Pulse 86   Temp 99 °F (37.2 °C) (Oral)   Resp 17   Ht 5' 8\" (1.727 m)   Wt 146 lb 2.6 oz (66.3 kg)   SpO2 95%   BMI 22.22 kg/m²     I/O: 08/26 0701 - 08/27 0700  In: -   Out: 2250 [Urine:2250]    Labs/Imaging Results:   Lab Results   Component Value Date     08/26/2020    K 4.5 08/26/2020     08/26/2020    CO2 25 08/26/2020    BUN 43 08/26/2020    CREATININE 1.2 08/26/2020    GLUCOSE 97 08/26/2020    CALCIUM 8.9 08/26/2020      Lab Results   Component Value Date    WBC 6.9 08/26/2020    HGB 9.4 (L) 08/26/2020    HCT 30.0 (L) 08/26/2020    MCV 86.7 08/26/2020     (L) 08/26/2020       Scheduled Meds:   collagenase, , Topical, BID    ferrous sulfate, 325 mg, Oral, BID WC    sodium chloride flush, 10 mL, Intravenous, 2 times per day    heparin (porcine), 5,000 Units, Subcutaneous, 3 times per day    [Held by provider] metoprolol succinate, 25 mg, Oral, Daily    mirtazapine, 45 mg, Oral, Nightly    QUEtiapine, 100 mg, Oral, BID    pantoprazole, 40 mg, Oral, BID AC    Physical Exam:  General: Alert and awake, interactive, no distress. HEENT: Anicteric sclerae, MMM. Extremities: No edema bilat LE. Buttock: Large sacral ulcer extending to the level of tendon with necrotic edges and small amount of residual eschar along the wound edge, less necrotic tissue associated with his wound today  Abdomen: Soft, nontender, nondistended. Assessment and Plan:  80 y.o. male s/p debridement of sacral ulcer. Covid positive. Wound was changed with Wound Care team today.   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

## 2020-08-27 NOTE — PROGRESS NOTES
[address]    Hospitalist Progress Note      Name:  Francisco Saleh /Age/Sex: 1932  (80 y.o. male)   MRN & CSN:  [id number] & [id number] Admission Date/Time: 2020  1:17 AM   Location:  -A PCP: Dominique Castañeda MD         Hospital Day: 7    Assessment and Plan:   Francisco Saleh is a 80 y.o.  male  who presents with shortness of breath     Acute hypoxic respiratory failure due to COVID pneumonia - resolving     Was recently treated for community-acquired pneumonia and sent to SNF  Chest x-ray on admission shows bibasilar atelectasis   ECHO with normal LVEF, Leukocytosis trending down  SARS-COV2 detected on  - was undetected on   Trend procalcitonin and CRP  Encourage incentive spirometry  Saturating in the 90s on room air since  -dexamethasone was discontinued by ID     Sepsis likely due to sacral decubitus ulcers, unstageable, present on admission  S/p I and D at bedside     Blood cultures negative to date, surgical culture pending   Surgery on board  Wound ostomy to evaluate  IV Ab ( vancomycin and cefepime) Dc per ID   Sepsis was ruled out    Confirmed COVID -23      SARS-COV2 detected on  was not detected on  - off oxygen and on room air - LFT high for remdesevir   Continue with Dexamethasone      Acute kidney injury on CKD 3 -resolved       Elevated liver enzymes -Stable    Recent hepatitis panel negative  Recommend follow-up as outpatient with GI     Elevated d-dimer -VQ scan was done on admission, shows low probability of PE      Chronic issues    Hypertension   Depression  COPD, not in exacerbation  Moderate PCM   Anemia of chronic disease     CODE STATUS -full code, palliative care consult to discuss goals of care    Patient stable for discharge to SNF if they accept COVID positive patient    Diet DIET CARDIAC;   Dietary Nutrition Supplements: Wound Healing Oral Supplement   DVT Prophylaxis [] Lovenox, []  Heparin, [] SCDs, []No VTE prophylaxis, patient ambulating   GI Or  ondansetron, 4 mg, Q6H PRN          Electronically signed by Donna Loya MD on 8/27/2020 at 12:29 PM

## 2020-08-28 LAB
ANION GAP SERPL CALCULATED.3IONS-SCNC: 12 MMOL/L (ref 4–16)
BASOPHILS ABSOLUTE: 0 K/CU MM
BASOPHILS RELATIVE PERCENT: 0 % (ref 0–1)
BUN BLDV-MCNC: 38 MG/DL (ref 6–23)
CALCIUM SERPL-MCNC: 8 MG/DL (ref 8.3–10.6)
CHLORIDE BLD-SCNC: 103 MMOL/L (ref 99–110)
CO2: 23 MMOL/L (ref 21–32)
CREAT SERPL-MCNC: 1.2 MG/DL (ref 0.9–1.3)
DIFFERENTIAL TYPE: ABNORMAL
EOSINOPHILS ABSOLUTE: 0 K/CU MM
EOSINOPHILS RELATIVE PERCENT: 0.6 % (ref 0–3)
GFR AFRICAN AMERICAN: >60 ML/MIN/1.73M2
GFR NON-AFRICAN AMERICAN: 57 ML/MIN/1.73M2
GLUCOSE BLD-MCNC: 92 MG/DL (ref 70–99)
HCT VFR BLD CALC: 29.1 % (ref 42–52)
HEMOGLOBIN: 8.8 GM/DL (ref 13.5–18)
HIGH SENSITIVE C-REACTIVE PROTEIN: 28.3 MG/L
IMMATURE NEUTROPHIL %: 2.6 % (ref 0–0.43)
LYMPHOCYTES ABSOLUTE: 0.8 K/CU MM
LYMPHOCYTES RELATIVE PERCENT: 12.6 % (ref 24–44)
MCH RBC QN AUTO: 26.7 PG (ref 27–31)
MCHC RBC AUTO-ENTMCNC: 30.2 % (ref 32–36)
MCV RBC AUTO: 88.4 FL (ref 78–100)
MONOCYTES ABSOLUTE: 0.6 K/CU MM
MONOCYTES RELATIVE PERCENT: 9 % (ref 0–4)
NUCLEATED RBC %: 0.3 %
PDW BLD-RTO: 14.6 % (ref 11.7–14.9)
PLATELET # BLD: 64 K/CU MM (ref 140–440)
PMV BLD AUTO: 10.2 FL (ref 7.5–11.1)
POTASSIUM SERPL-SCNC: 4.6 MMOL/L (ref 3.5–5.1)
PROCALCITONIN: 0.13
RBC # BLD: 3.29 M/CU MM (ref 4.6–6.2)
SEGMENTED NEUTROPHILS ABSOLUTE COUNT: 5 K/CU MM
SEGMENTED NEUTROPHILS RELATIVE PERCENT: 75.2 % (ref 36–66)
SODIUM BLD-SCNC: 138 MMOL/L (ref 135–145)
TOTAL IMMATURE NEUTOROPHIL: 0.17 K/CU MM
TOTAL NUCLEATED RBC: 0 K/CU MM
WBC # BLD: 6.7 K/CU MM (ref 4–10.5)

## 2020-08-28 PROCEDURE — 86141 C-REACTIVE PROTEIN HS: CPT

## 2020-08-28 PROCEDURE — 1200000000 HC SEMI PRIVATE

## 2020-08-28 PROCEDURE — 6360000002 HC RX W HCPCS: Performed by: INTERNAL MEDICINE

## 2020-08-28 PROCEDURE — 80048 BASIC METABOLIC PNL TOTAL CA: CPT

## 2020-08-28 PROCEDURE — 84145 PROCALCITONIN (PCT): CPT

## 2020-08-28 PROCEDURE — 2580000003 HC RX 258: Performed by: INTERNAL MEDICINE

## 2020-08-28 PROCEDURE — 85025 COMPLETE CBC W/AUTO DIFF WBC: CPT

## 2020-08-28 PROCEDURE — 6370000000 HC RX 637 (ALT 250 FOR IP): Performed by: INTERNAL MEDICINE

## 2020-08-28 RX ADMIN — SODIUM CHLORIDE, PRESERVATIVE FREE 10 ML: 5 INJECTION INTRAVENOUS at 08:18

## 2020-08-28 RX ADMIN — COLLAGENASE SANTYL: 250 OINTMENT TOPICAL at 15:09

## 2020-08-28 RX ADMIN — MIRTAZAPINE 45 MG: 15 TABLET, FILM COATED ORAL at 22:54

## 2020-08-28 RX ADMIN — QUETIAPINE FUMARATE 100 MG: 100 TABLET ORAL at 08:17

## 2020-08-28 RX ADMIN — PANTOPRAZOLE SODIUM 40 MG: 40 TABLET, DELAYED RELEASE ORAL at 15:09

## 2020-08-28 RX ADMIN — PANTOPRAZOLE SODIUM 40 MG: 40 TABLET, DELAYED RELEASE ORAL at 06:37

## 2020-08-28 RX ADMIN — COLLAGENASE SANTYL: 250 OINTMENT TOPICAL at 04:03

## 2020-08-28 RX ADMIN — HEPARIN SODIUM 5000 UNITS: 5000 INJECTION, SOLUTION INTRAVENOUS; SUBCUTANEOUS at 06:37

## 2020-08-28 RX ADMIN — QUETIAPINE FUMARATE 100 MG: 100 TABLET ORAL at 22:54

## 2020-08-28 RX ADMIN — FERROUS SULFATE TAB 325 MG (65 MG ELEMENTAL FE) 325 MG: 325 (65 FE) TAB at 15:09

## 2020-08-28 RX ADMIN — SODIUM CHLORIDE, PRESERVATIVE FREE 10 ML: 5 INJECTION INTRAVENOUS at 22:55

## 2020-08-28 RX ADMIN — FERROUS SULFATE TAB 325 MG (65 MG ELEMENTAL FE) 325 MG: 325 (65 FE) TAB at 08:17

## 2020-08-28 ASSESSMENT — PAIN DESCRIPTION - FREQUENCY: FREQUENCY: CONTINUOUS

## 2020-08-28 ASSESSMENT — PAIN DESCRIPTION - PROGRESSION: CLINICAL_PROGRESSION: GRADUALLY WORSENING

## 2020-08-28 ASSESSMENT — PAIN DESCRIPTION - PAIN TYPE: TYPE: ACUTE PAIN

## 2020-08-28 ASSESSMENT — PAIN SCALES - GENERAL
PAINLEVEL_OUTOF10: 5
PAINLEVEL_OUTOF10: 0
PAINLEVEL_OUTOF10: 0

## 2020-08-28 ASSESSMENT — PAIN - FUNCTIONAL ASSESSMENT: PAIN_FUNCTIONAL_ASSESSMENT: ACTIVITIES ARE NOT PREVENTED

## 2020-08-28 ASSESSMENT — PAIN DESCRIPTION - LOCATION: LOCATION: COCCYX

## 2020-08-28 ASSESSMENT — PAIN DESCRIPTION - ONSET: ONSET: ON-GOING

## 2020-08-28 ASSESSMENT — PAIN SCALES - WONG BAKER: WONGBAKER_NUMERICALRESPONSE: 0

## 2020-08-28 NOTE — PLAN OF CARE
Problem: Skin Integrity:  Goal: Will show no infection signs and symptoms  Description: Will show no infection signs and symptoms  Outcome: Ongoing  Goal: Absence of new skin breakdown  Description: Absence of new skin breakdown  Outcome: Ongoing     Problem: Airway Clearance - Ineffective  Goal: Achieve or maintain patent airway  Outcome: Ongoing     Problem: Gas Exchange - Impaired  Goal: Absence of hypoxia  Outcome: Ongoing  Goal: Promote optimal lung function  Outcome: Ongoing     Problem: Breathing Pattern - Ineffective  Goal: Ability to achieve and maintain a regular respiratory rate  Outcome: Ongoing     Problem:  Body Temperature -  Risk of, Imbalanced  Goal: Ability to maintain a body temperature within defined limits  Outcome: Ongoing  Goal: Will regain or maintain usual level of consciousness  Outcome: Ongoing  Goal: Complications related to the disease process, condition or treatment will be avoided or minimized  Outcome: Ongoing     Problem: Isolation Precautions - Risk of Spread of Infection  Goal: Prevent transmission of infection  Outcome: Ongoing     Problem: Nutrition Deficits  Goal: Optimize nutrtional status  Outcome: Ongoing     Problem: Risk for Fluid Volume Deficit  Goal: Maintain normal heart rhythm  Outcome: Ongoing  Goal: Maintain absence of muscle cramping  Outcome: Ongoing  Goal: Maintain normal serum potassium, sodium, calcium, phosphorus, and pH  Outcome: Ongoing     Problem: Loneliness or Risk for Loneliness  Goal: Demonstrate positive use of time alone when socialization is not possible  Outcome: Ongoing     Problem: Fatigue  Goal: Verbalize increase energy and improved vitality  Outcome: Ongoing     Problem: Patient Education: Go to Patient Education Activity  Goal: Patient/Family Education  Outcome: Ongoing     Problem: SAFETY  Goal: Free from accidental physical injury  Outcome: Ongoing  Goal: Free from intentional harm  Outcome: Ongoing     Problem: DAILY CARE  Goal: Daily care needs are met  Outcome: Ongoing     Problem: PAIN  Goal: Patient's pain/discomfort is manageable  Outcome: Ongoing     Problem: SKIN INTEGRITY  Goal: Skin integrity is maintained or improved  Outcome: Ongoing     Problem: KNOWLEDGE DEFICIT  Goal: Patient/S.O. demonstrates understanding of disease process, treatment plan, medications, and discharge instructions. Outcome: Ongoing     Problem: DISCHARGE BARRIERS  Goal: Patient's continuum of care needs are met  Outcome: Ongoing     Problem: Pain:  Description: Pain management should include both nonpharmacologic and pharmacologic interventions.   Goal: Pain level will decrease  Description: Pain level will decrease  Outcome: Ongoing  Goal: Control of acute pain  Description: Control of acute pain  Outcome: Ongoing  Goal: Control of chronic pain  Description: Control of chronic pain  Outcome: Ongoing

## 2020-08-28 NOTE — PROGRESS NOTES
[address]    Hospitalist Progress Note      Name:  Doreen Santizo /Age/Sex: 1932  (80 y.o. male)   MRN & CSN:  [id number] & [id number] Admission Date/Time: 2020  1:17 AM   Location:  -A PCP: Aramis Robbins MD         Hospital Day: 8    Assessment and Plan:   Doreen Santizo is a 80 y.o.  male  who presents with shortness of breath     Acute hypoxic respiratory failure due to COVID pneumonia - resolving     Was recently treated for community-acquired pneumonia and sent to SNF  Chest x-ray on admission shows bibasilar atelectasis   ECHO with normal LVEF, Leukocytosis trending down  SARS-COV2 detected on  - was undetected on   Trend procalcitonin and CRP  Encourage incentive spirometry  Saturating in the 90s on room air since  -dexamethasone was discontinued by ID     Sepsis likely due to sacral decubitus ulcers, unstageable, present on admission  S/p I and D at bedside     Blood cultures negative to date, surgical culture pending   Surgery on board  Wound ostomy -local wound care   IV Ab ( vancomycin and cefepime) Dc per ID   Sepsis was ruled out    Confirmed COVID -23      SARS-COV2 detected on  was not detected on  - off oxygen and on room air - LFT high for remdesevir   Continue with Dexamethasone      Acute kidney injury on CKD 3 -resolved       Elevated liver enzymes -Stable    Recent hepatitis panel negative  Recommend follow-up as outpatient with GI     Elevated d-dimer -VQ scan was done on admission, shows low probability of PE      Chronic issues    Hypertension   Depression  COPD, not in exacerbation  Moderate PCM   Anemia of chronic disease     CODE STATUS -full code, palliative care consult to discuss goals of care    Patient stable for discharge to SNF if they accept COVID positive patient    Diet DIET CARDIAC;   Dietary Nutrition Supplements: Wound Healing Oral Supplement   DVT Prophylaxis [] Lovenox, []  Heparin, [] SCDs, []No VTE prophylaxis, patient ambulating GI Prophylaxis [] PPI, [] H2 Blocker, [] No GI prophylaxis, patient is receiving diet/Tube Feeds   Code Status Limited   Disposition Patient requires continued admission due to Sepsis/COVID   MDM [] Low, [x] Moderate,[]  High     History of Present Illness: Subjective     Patient Seen & Examined at the bedside     Patient is resting in bed with no distress while on room air - denies any new complaints   No reported fever    Ten point ROS reviewed negative, unless as noted above    Objective: Intake/Output Summary (Last 24 hours) at 8/28/2020 1310  Last data filed at 8/28/2020 0603  Gross per 24 hour   Intake --   Output 2650 ml   Net -2650 ml      Vitals:   Vitals:    08/28/20 0800   BP: (!) 124/55   Pulse: 75   Resp: 18   Temp: 98.5 °F (36.9 °C)   SpO2: 95%     Physical Exam:    GEN Awake male, resting in bed in no apparent distress. Appears given age. RESP Clear to auscultation, no wheezes, rales or rhonchi. CARDIO/VASC -S1/S2 auscultated. Regular rate without appreciable murmurs, rubs, or gallops. Peripheral pulses equal bilaterally and palpable. No peripheral edema. GI Abdomen is soft without significant tenderness, masses, or guarding. Bowel sounds are normoactive. Rectal exam deferred.    SKIN Large decubitus -sacral stage IV   NEURO Confused and disoriented     Medications:   Medications:    collagenase   Topical BID    ferrous sulfate  325 mg Oral BID WC    sodium chloride flush  10 mL Intravenous 2 times per day    heparin (porcine)  5,000 Units Subcutaneous 3 times per day    [Held by provider] metoprolol succinate  25 mg Oral Daily    mirtazapine  45 mg Oral Nightly    QUEtiapine  100 mg Oral BID    pantoprazole  40 mg Oral BID AC      Infusions:   PRN Meds: sodium chloride flush, 10 mL, PRN  acetaminophen, 650 mg, Q6H PRN    Or  acetaminophen, 650 mg, Q6H PRN  polyethylene glycol, 17 g, Daily PRN  promethazine, 12.5 mg, Q6H PRN    Or  ondansetron, 4 mg, Q6H PRN          Electronically signed by Kira Adam MD on 8/28/2020 at 11:10 AM

## 2020-08-29 LAB
ANION GAP SERPL CALCULATED.3IONS-SCNC: 12 MMOL/L (ref 4–16)
BASOPHILS ABSOLUTE: 0 K/CU MM
BASOPHILS ABSOLUTE: NORMAL K/CU MM
BASOPHILS RELATIVE PERCENT: 0.2 % (ref 0–1)
BASOPHILS RELATIVE PERCENT: NORMAL % (ref 0–1)
BUN BLDV-MCNC: 38 MG/DL (ref 6–23)
CALCIUM SERPL-MCNC: 8.4 MG/DL (ref 8.3–10.6)
CHLORIDE BLD-SCNC: 99 MMOL/L (ref 99–110)
CO2: 26 MMOL/L (ref 21–32)
CREAT SERPL-MCNC: 1.4 MG/DL (ref 0.9–1.3)
DIFFERENTIAL TYPE: ABNORMAL
DIFFERENTIAL TYPE: NORMAL
EOSINOPHILS ABSOLUTE: 0 K/CU MM
EOSINOPHILS ABSOLUTE: NORMAL K/CU MM
EOSINOPHILS RELATIVE PERCENT: 0.4 % (ref 0–3)
EOSINOPHILS RELATIVE PERCENT: NORMAL % (ref 0–3)
GFR AFRICAN AMERICAN: 58 ML/MIN/1.73M2
GFR NON-AFRICAN AMERICAN: 48 ML/MIN/1.73M2
GLUCOSE BLD-MCNC: 103 MG/DL (ref 70–99)
HCT VFR BLD CALC: 33.9 % (ref 42–52)
HCT VFR BLD CALC: NORMAL % (ref 42–52)
HEMOGLOBIN: 10.3 GM/DL (ref 13.5–18)
HEMOGLOBIN: NORMAL GM/DL (ref 13.5–18)
IMMATURE NEUTROPHIL %: 1.9 % (ref 0–0.43)
IMMATURE NEUTROPHIL %: NORMAL % (ref 0–0.43)
LYMPHOCYTES ABSOLUTE: 1.3 K/CU MM
LYMPHOCYTES ABSOLUTE: NORMAL K/CU MM
LYMPHOCYTES RELATIVE PERCENT: 13.8 % (ref 24–44)
LYMPHOCYTES RELATIVE PERCENT: NORMAL % (ref 24–44)
MCH RBC QN AUTO: 27.2 PG (ref 27–31)
MCH RBC QN AUTO: NORMAL PG (ref 27–31)
MCHC RBC AUTO-ENTMCNC: 30.4 % (ref 32–36)
MCHC RBC AUTO-ENTMCNC: NORMAL % (ref 32–36)
MCV RBC AUTO: 89.4 FL (ref 78–100)
MCV RBC AUTO: NORMAL FL (ref 78–100)
MONOCYTES ABSOLUTE: 0.8 K/CU MM
MONOCYTES ABSOLUTE: NORMAL K/CU MM
MONOCYTES RELATIVE PERCENT: 8.2 % (ref 0–4)
MONOCYTES RELATIVE PERCENT: NORMAL % (ref 0–4)
NUCLEATED RBC %: 0 %
NUCLEATED RBC %: NORMAL %
PDW BLD-RTO: 14.9 % (ref 11.7–14.9)
PDW BLD-RTO: NORMAL % (ref 11.7–14.9)
PLATELET # BLD: 194 K/CU MM (ref 140–440)
PLATELET # BLD: NORMAL K/CU MM (ref 140–440)
PMV BLD AUTO: 9.5 FL (ref 7.5–11.1)
PMV BLD AUTO: NORMAL FL (ref 7.5–11.1)
POTASSIUM SERPL-SCNC: 4.6 MMOL/L (ref 3.5–5.1)
RBC # BLD: 3.79 M/CU MM (ref 4.6–6.2)
RBC # BLD: NORMAL M/CU MM (ref 4.6–6.2)
REASON FOR REJECTION: NORMAL
REJECTED TEST: NORMAL
SEGMENTED NEUTROPHILS ABSOLUTE COUNT: 7.3 K/CU MM
SEGMENTED NEUTROPHILS ABSOLUTE COUNT: NORMAL K/CU MM
SEGMENTED NEUTROPHILS RELATIVE PERCENT: 75.5 % (ref 36–66)
SEGMENTED NEUTROPHILS RELATIVE PERCENT: NORMAL % (ref 36–66)
SODIUM BLD-SCNC: 137 MMOL/L (ref 135–145)
TOTAL IMMATURE NEUTOROPHIL: 0.18 K/CU MM
TOTAL IMMATURE NEUTOROPHIL: NORMAL K/CU MM
TOTAL NUCLEATED RBC: 0 K/CU MM
TOTAL NUCLEATED RBC: NORMAL K/CU MM
TOTAL RETICULOCYTE COUNT: NORMAL K/CU MM
WBC # BLD: 9.6 K/CU MM (ref 4–10.5)
WBC # BLD: NORMAL K/CU MM (ref 4–10.5)

## 2020-08-29 PROCEDURE — 6370000000 HC RX 637 (ALT 250 FOR IP): Performed by: INTERNAL MEDICINE

## 2020-08-29 PROCEDURE — 2580000003 HC RX 258: Performed by: INTERNAL MEDICINE

## 2020-08-29 PROCEDURE — 97163 PT EVAL HIGH COMPLEX 45 MIN: CPT

## 2020-08-29 PROCEDURE — 97530 THERAPEUTIC ACTIVITIES: CPT

## 2020-08-29 PROCEDURE — U0002 COVID-19 LAB TEST NON-CDC: HCPCS

## 2020-08-29 PROCEDURE — 85025 COMPLETE CBC W/AUTO DIFF WBC: CPT

## 2020-08-29 PROCEDURE — 1200000000 HC SEMI PRIVATE

## 2020-08-29 PROCEDURE — 80048 BASIC METABOLIC PNL TOTAL CA: CPT

## 2020-08-29 PROCEDURE — 97167 OT EVAL HIGH COMPLEX 60 MIN: CPT

## 2020-08-29 RX ADMIN — MIRTAZAPINE 45 MG: 15 TABLET, FILM COATED ORAL at 21:18

## 2020-08-29 RX ADMIN — SODIUM CHLORIDE, PRESERVATIVE FREE 10 ML: 5 INJECTION INTRAVENOUS at 08:03

## 2020-08-29 RX ADMIN — SODIUM CHLORIDE, PRESERVATIVE FREE 10 ML: 5 INJECTION INTRAVENOUS at 21:18

## 2020-08-29 RX ADMIN — ACETAMINOPHEN 650 MG: 325 TABLET ORAL at 21:20

## 2020-08-29 RX ADMIN — PANTOPRAZOLE SODIUM 40 MG: 40 TABLET, DELAYED RELEASE ORAL at 08:03

## 2020-08-29 RX ADMIN — QUETIAPINE FUMARATE 100 MG: 100 TABLET ORAL at 21:18

## 2020-08-29 RX ADMIN — FERROUS SULFATE TAB 325 MG (65 MG ELEMENTAL FE) 325 MG: 325 (65 FE) TAB at 08:03

## 2020-08-29 RX ADMIN — COLLAGENASE SANTYL: 250 OINTMENT TOPICAL at 04:15

## 2020-08-29 RX ADMIN — QUETIAPINE FUMARATE 100 MG: 100 TABLET ORAL at 08:03

## 2020-08-29 RX ADMIN — PANTOPRAZOLE SODIUM 40 MG: 40 TABLET, DELAYED RELEASE ORAL at 16:56

## 2020-08-29 RX ADMIN — FERROUS SULFATE TAB 325 MG (65 MG ELEMENTAL FE) 325 MG: 325 (65 FE) TAB at 16:56

## 2020-08-29 RX ADMIN — COLLAGENASE SANTYL: 250 OINTMENT TOPICAL at 16:53

## 2020-08-29 ASSESSMENT — PAIN SCALES - WONG BAKER
WONGBAKER_NUMERICALRESPONSE: 0

## 2020-08-29 ASSESSMENT — PAIN SCALES - GENERAL
PAINLEVEL_OUTOF10: 0
PAINLEVEL_OUTOF10: 3
PAINLEVEL_OUTOF10: 0

## 2020-08-29 ASSESSMENT — PAIN DESCRIPTION - PROGRESSION
CLINICAL_PROGRESSION: GRADUALLY WORSENING

## 2020-08-29 NOTE — CONSULTS
[address], 12/31/1932, 2018/2018-A, 8/29/2020    Discharge Recommendation: [facility]      History:  [place]:  The primary encounter diagnosis was Acute febrile illness. A diagnosis of Hypoxia was also pertinent to this visit. Subjective:  Patient states: Agreeable to therapy. Pain: Pt denied pain this date  Communication with other providers: PT, RN, LSW  Restrictions: General Precautions, Fall Risk, Droplet Plus (Covid+)    Home Setup/Prior level of function:    Social/Functional History  Lives With: Son  Type of Home: House  Home Layout: Two level  Home Access: Stairs to enter with rails  Entrance Stairs - Number of Steps: Pt uncertain  Entrance Stairs - Rails: Both  Home Equipment: Cane, Rolling walker  ADL Assistance: Independent  Homemaking Assistance: Independent  Ambulation Assistance: Independent (mod I with cane or RW)  Transfer Assistance: Independent  Active : No (Son drives)  Occupation: Retired from [employer]  Additional Comments: Pt was not a fully reliable historian this date. All above information should be verified. **Taken from [facility] on 8/5/2020. Pt with decreased cognition this date and PLOF requires follow-up. **    Examination:  · Observation: Supine in bed upon arrival  · [facility] [place] [place]  · Hearing: [facility]  · Vitals: Stable vitals throughout session, O2 remained in the 90s    Body Systems and functions:  · ROM:   · BUE PROM full range   · BUE AROM approx 90 degrees  · Strength: Decreased command following and decreased ability to sustain BUE in 90 degrees of shoulder flexion. Decreased  strength. · Sensation: WFL  · Tone: Normal  · Coordination: BUE tremoring  · Perception: WNL    Activities of Daily Living (ADLs):  · Feeding: Nilo (anticipate pt would require assist cutting d/t decreased grasp and executive functioning this date).    · Grooming: ModA (anticipate assist to maintain upright and to sequence through task and complete effectively). · UB bathing: MaxA (anticipate assist to maintain upright and to sequence through task and complete effectively). · LB bathing: MaxA (anticipate assist to maintain upright and to sequence through task and complete effectively). · UB dressing: ModA  · LB dressing: MaxA   · Toileting: MaxA (anticipate SPT to [facility] with assist for clothing manipulation and linda care). Cognitive and Psychosocial Functioning:  · Overall cognitive status: Impaired (Pt was oriented to self and knew he was in [place], [place], however pt thought he was at home and was disoriented to time). · Affect: Normal     Balance:   · Sitting: Nilo (demo L side and posterior lean while seated at EOB and in chair, fair sitting balance). Pt tolerated sitting upright in chair for approx 8 min before requesting to return to bed d/t pain (sacral wound). · Standing: MaxA (pt unable to maintain full upright standing, no AD utilized this date). Functional Mobility:  · Bed Mobility: MaxA (supine to seated at EOB with assist for BLE positioning and trunk support). · Transfers:   · MaxA (STS from EOB and chair)   · MaxA (SPT to chair/bed)    · Ambulation: NT this date. AM-PAC 6 click short form for inpatient daily activity:   How much help from another person does the patient currently need. .. Unable  Dep A Lot  Max A A Lot   Mod A A Little  Min A A Little   CGA  SBA None   Mod I  Indep  Sup   1. Putting on and taking off regular lower body clothing? [] 1    [x] 2   [] 2   [] 3   [] 3   [] 4      2. Bathing (including washing, rinsing, drying)? [] 1   [x] 2   [] 2 [] 3 [] 3 [] 4   3. Toileting, which includes using toilet, bedpan, or urinal? [] 1    [x] 2   [] 2   [] 3   [] 3   [] 4     4. Putting on and taking off regular upper body clothing? [] 1   [x] 2   [] 2   [] 3   [] 3    [] 4      5. Taking care of personal grooming such as brushing teeth?  [] 1   [] 2    [x] 2 [] 3    [] 3   [] 4 6. Eating meals? [] 1   [] 2   [] 2   [x] 3   [] 3   [] 4      Raw Score:  13     [24=0% impaired(CH), 23=1-19%(CI), 20-22=20-39%(CJ), 15-19=40-59%(CK), 10-14=60-79%(CL), 7-9=80-99%(CM), 6=100%(CN)]     Treatment:  Therapeutic Activity Training:   Therapeutic activity training was instructed today. Cues were given for safety, sequence, UE/LE placement, awareness, and balance. Activities performed today included bed mobility training, sup-sit, sit-stand, SPT. Safety Measures: Gait belt used, Left in bed, Alarm in place    Assessment:  Assessment  Performance deficits / Impairments: Decreased functional mobility , Decreased ROM, Decreased endurance, Decreased balance, Decreased high-level IADLs, Decreased ADL status, Decreased strength, Decreased cognition, Decreased posture  Treatment Diagnosis: Acute Respiratory Failure  Prognosis: Good  Decision Making: High Complexity  REQUIRES OT FOLLOW UP: Yes  Discharge Recommendations: [address]     Pt is an 80year old M admitted for acute respiratory failure. Per chart review pt is Covid+ with a new test pending. This date pt demo decreased cognition, strength, and activity tolerance impacting mobility and ADL status. Pt is currently functioning below baseline and would benefit from skilled OT services at SNF. Plan:  Plan  Times per week: 2+  Times per day: Daily      Goals:  1. Pt will complete all aspects of bed mobility for EOB/OOB ADLs with ModA. 2. Pt will complete UB/LB bathing with ModA. 3. Pt will complete all aspects of LB dressing with ModA. 4. Pt will complete all functional transfers to and from bed, chair, toilet, shower chair with ModA and RW.   5. Pt will complete all aspects of toileting task with ModA. 6. Pt will complete oral hygiene/grooming routine with CGA. 7. Pt will complete ther ex/ther act with focus on UB strengthening.      Time:   Time in: 1522  Time out: 1603  Timed treatment minutes: 30  Total time:

## 2020-08-29 NOTE — PROGRESS NOTES
[address]    Hospitalist Progress Note      Name:  Sofi Sheehan /Age/Sex: 1932  (80 y.o. male)   MRN & CSN:  [id number] & [id number] Admission Date/Time: 2020  1:17 AM   Location:  -A PCP: Lenard Muhammad MD         Hospital Day: 9    Assessment and Plan:   Sofi Sheehan is a 80 y.o.  male  who presents with shortness of breath     Acute hypoxic respiratory failure due to COVID pneumonia - resolving     Was recently treated for community-acquired pneumonia and sent to SNF  Chest x-ray on admission shows bibasilar atelectasis   ECHO with normal LVEF, Leukocytosis resolved  SARS-COV2 detected on  - was undetected on  - repeat pending   Trend procalcitonin and CRP  Saturating in the 90s on room air since  -  Dexamethasone was discontinued by ID     Sepsis likely due to sacral decubitus ulcers, unstageable, present on admission  S/p I and D at bedside     Blood cultures negative to date, surgical culture pending   Surgery on board  Wound ostomy -local wound care   IV Ab ( vancomycin and cefepime) Dc per ID   Sepsis was ruled out    Confirmed COVID -23      SARS-COV2 detected on  was not detected on  - off oxygen and on room air - LFT high for remdesevir   Continue with Dexamethasone      Acute kidney injury on CKD 3 -resolved       Elevated liver enzymes -Stable    Recent hepatitis panel negative  Recommend follow-up as outpatient with GI     Elevated d-dimer -VQ scan was done on admission, shows low probability of PE      Chronic issues    Hypertension   Depression  COPD, not in exacerbation  Moderate PCM   Anemia of chronic disease     CODE STATUS -full code, palliative care consult to discuss goals of care    Patient stable for discharge to SNF if they accept COVID positive patient - pending PT/OT eval    Diet DIET CARDIAC;   Dietary Nutrition Supplements: Wound Healing Oral Supplement  Dietary Nutrition Supplements: Standard High Calorie Oral Supplement   DVT Prophylaxis [] Lovenox, []  Heparin, [] SCDs, []No VTE prophylaxis, patient ambulating   GI Prophylaxis [] PPI, [] H2 Blocker, [] No GI prophylaxis, patient is receiving diet/Tube Feeds   Code Status Limited   Disposition Patient requires continued admission due to Sepsis/COVID   MDM [] Low, [x] Moderate,[]  High     History of Present Illness: Subjective     Patient Seen & Examined at the bedside     Patient is resting in bed with no distress while on room air - no new complaints       Ten point ROS reviewed negative, unless as noted above    Objective: Intake/Output Summary (Last 24 hours) at 8/29/2020 1008  Last data filed at 8/29/2020 0803  Gross per 24 hour   Intake 10 ml   Output 1275 ml   Net -1265 ml      Vitals:   Vitals:    08/29/20 0804   BP: 138/67   Pulse: 82   Resp: 17   Temp: 99.2 °F (37.3 °C)   SpO2: 92%     Physical Exam:    GEN Awake male, resting in bed in no apparent distress. Appears given age. RESP Clear to auscultation, no wheezes, rales or rhonchi. CARDIO/VASC -S1/S2 auscultated. Regular rate without appreciable murmurs, rubs, or gallops. Peripheral pulses equal bilaterally and palpable. No peripheral edema. GI Abdomen is soft without significant tenderness, masses, or guarding. Bowel sounds are normoactive. Rectal exam deferred.    SKIN Large decubitus -sacral stage IV   NEURO Confused and disoriented     Medications:   Medications:    collagenase   Topical BID    ferrous sulfate  325 mg Oral BID WC    sodium chloride flush  10 mL Intravenous 2 times per day    heparin (porcine)  5,000 Units Subcutaneous 3 times per day    [Held by provider] metoprolol succinate  25 mg Oral Daily    mirtazapine  45 mg Oral Nightly    QUEtiapine  100 mg Oral BID    pantoprazole  40 mg Oral BID AC      Infusions:   PRN Meds: sodium chloride flush, 10 mL, PRN  acetaminophen, 650 mg, Q6H PRN    Or  acetaminophen, 650 mg, Q6H PRN  polyethylene glycol, 17 g, Daily PRN  promethazine, 12.5 mg, Q6H PRN Or  ondansetron, 4 mg, Q6H PRN          Electronically signed by Madeleine Wyatt MD on 8/29/2020 at 11:10 AM

## 2020-08-29 NOTE — PLAN OF CARE
Problem: Skin Integrity:  Goal: Will show no infection signs and symptoms  Description: Will show no infection signs and symptoms  Outcome: Ongoing  Goal: Absence of new skin breakdown  Description: Absence of new skin breakdown  Outcome: Ongoing     Problem: Airway Clearance - Ineffective  Goal: Achieve or maintain patent airway  Outcome: Ongoing     Problem: Gas Exchange - Impaired  Goal: Absence of hypoxia  Outcome: Ongoing  Goal: Promote optimal lung function  Outcome: Ongoing     Problem: Breathing Pattern - Ineffective  Goal: Ability to achieve and maintain a regular respiratory rate  Outcome: Ongoing     Problem:  Body Temperature -  Risk of, Imbalanced  Goal: Ability to maintain a body temperature within defined limits  Outcome: Ongoing  Goal: Will regain or maintain usual level of consciousness  Outcome: Ongoing  Goal: Complications related to the disease process, condition or treatment will be avoided or minimized  Outcome: Ongoing     Problem: Isolation Precautions - Risk of Spread of Infection  Goal: Prevent transmission of infection  Outcome: Ongoing     Problem: Nutrition Deficits  Goal: Optimize nutrtional status  Outcome: Ongoing     Problem: Risk for Fluid Volume Deficit  Goal: Maintain normal heart rhythm  Outcome: Ongoing  Goal: Maintain absence of muscle cramping  Outcome: Ongoing  Goal: Maintain normal serum potassium, sodium, calcium, phosphorus, and pH  Outcome: Ongoing     Problem: Loneliness or Risk for Loneliness  Goal: Demonstrate positive use of time alone when socialization is not possible  Outcome: Ongoing     Problem: Fatigue  Goal: Verbalize increase energy and improved vitality  Outcome: Ongoing     Problem: Patient Education: Go to Patient Education Activity  Goal: Patient/Family Education  Outcome: Ongoing     Problem: SAFETY  Goal: Free from accidental physical injury  Outcome: Ongoing  Goal: Free from intentional harm  Outcome: Ongoing     Problem: DAILY CARE  Goal: Daily care needs are met  Outcome: Ongoing     Problem: PAIN  Goal: Patient's pain/discomfort is manageable  Outcome: Ongoing     Problem: SKIN INTEGRITY  Goal: Skin integrity is maintained or improved  Outcome: Ongoing     Problem: KNOWLEDGE DEFICIT  Goal: Patient/S.O. demonstrates understanding of disease process, treatment plan, medications, and discharge instructions.   Outcome: Ongoing     Problem: DISCHARGE BARRIERS  Goal: Patient's continuum of care needs are met  Outcome: Ongoing     Problem: Pain:  Goal: Pain level will decrease  Description: Pain level will decrease  Outcome: Ongoing  Goal: Control of acute pain  Description: Control of acute pain  Outcome: Ongoing  Goal: Control of chronic pain  Description: Control of chronic pain  Outcome: Ongoing

## 2020-08-29 NOTE — PROGRESS NOTES
Physical Therapy  [facility] ACUTE CARE PHYSICAL THERAPY EVALUATION  Nancie Book, 12/31/1932, 2018/2018-A, 8/29/2020    History  [place]:  The primary encounter diagnosis was Acute febrile illness. A diagnosis of Hypoxia was also pertinent to this visit. Patient  has a past medical history of Anxiety, Depression, Hypertension, and Nerves. Patient  has a past surgical history that includes joint replacement and Upper gastrointestinal endoscopy (N/A, 8/12/2020). Subjective:  Patient states: \"Its burning! \" (referring to hit buttock)   Pain:  Burning pain in his buttock, did not quantify   Communication with other providers:   RN, co-eval with OTJuhi   Restrictions: droplet plus (covid +), falls, portable tele, pulse ox, BP cuff, crowley     Home Setup/Prior level of function   Social/Functional History  Lives With: Son  Type of Home: House  Home Layout: Two level  Home Access: Stairs to enter with rails  Entrance Stairs - Number of Steps: Pt uncertain  Entrance Stairs - Rails: Both  Home Equipment: Cane, Rolling walker  ADL Assistance: Independent  Homemaking Assistance: Independent  Ambulation Assistance: Independent (mod I with cane or RW)  Transfer Assistance: Independent  Active : No (Son drives)  Occupation: Retired from [employer]  Additional Comments: Pt was not a fully reliable historian this date. All above information should be verified. **Taken from prior eval this year** Pt was admitted from SNF this visit. Examination of body systems (includes body structures/functions, activity/participation limitations):  · Observation:  Supine in bed upon arrival. Cooperative with therapy   · Vision:  WFL, glasses  · Hearing:  [place]   · Cardiopulmonary:  Stable vitals throughout session on room air   · Orientation:oriented to self and city. Musculoskeletal  · ROM R/L:  WFL BLEs   · Strength R/L:  BLEs grossly 4-/5. Significant strength impairments observed in function and endurance. Mobility/treatment:   · Rolling L/R:  maxA to the left   · Supine to sit: maxA for LE guidance to EOB and uprighting trunk. · Sit to supine: maxA x 2 for trunk and LE management   · Transfers:   · Sit to stand maxA from EOB and recliner. Slow transition. · Stand to sit: modA for eccentric control to recliner and EOB   · Squat pivot: maxA from EOB to/from recliner for lift and sequencing pivot turn. · Sitting balance:  [place] at EOB static and light dynamic with single UE support, left and posterior lateral lean (attempting to alleviate pressure from buttock)  · Standing balance:  MaxA, no AD static stance. Very fwd posture with slightly flexed bilat LEs   · Gait: NT   · Educated pt on POC, role of PT. Cues provided for posture, UE/LE placement, weight shift, and overall sequencing to inc safety and indep with mobility     [facility] 6 Clicks Inpatient Mobility:  AM-PAC Inpatient Mobility Raw Score : 9    Safety: patient left in bed, call light within reach, gait belt used. Assessment:  Pt is an 80year old male admitted for acute respiratory failure. Per chart review pt is Covid+ with a new test pending. Admitted from SNF. Recommend continued therapy in subacute setting once medically stable to address his current deficits, dec potential fall risk, and restore PLOF.    Complexity: High   Prognosis: Good/fair  Plan Times per week: 2+/week  Discharge Recommendations: Subacute/Skilled Nursing Facility  Equipment: continue to assess at next level of care     Goals:  Short term goals  Time Frame for Short term goals: 1 week  Short term goal 1: Pt will perform sit><supine modA  Short term goal 2: Pt will transition sit><stand modA  Short term goal 3: Pt will transfer between surfaces modA  Short term goal 4: Pt will ambulate 10ft with RW modA  Short term goal 5: Pt will perform light sitting dynamic activity with no UE support x 3 minutes CGA       Treatment plan:  Bed mobility, transfers, balance, gait, TA, TX, willie, [facility] DARNELL    Recommendations for NURSING mobility: squat pivot 2 person     Time:   Time in: 1515  Time out: 1555  Timed treatment minutes: 25  Total time: 40    Electronically signed by:    Valentina Domínguez [id number]  8/29/2020, 4:34 PM

## 2020-08-29 NOTE — CARE COORDINATION
Spoke with Thiago Ahmadi from [place], pt will need to be covid neg prior to coming back to their facility d/t they no longer have a covid unit up and running. If pt is ready for discharge prior to being covid neg he could go to [facility]'s sister facility in [address] until he is negative or [place] opens a covid unit. CM will continue to follow.

## 2020-08-30 LAB
ANION GAP SERPL CALCULATED.3IONS-SCNC: 13 MMOL/L (ref 4–16)
BASOPHILS ABSOLUTE: 0 K/CU MM
BASOPHILS RELATIVE PERCENT: 0.1 % (ref 0–1)
BUN BLDV-MCNC: 37 MG/DL (ref 6–23)
CALCIUM SERPL-MCNC: 8.1 MG/DL (ref 8.3–10.6)
CHLORIDE BLD-SCNC: 102 MMOL/L (ref 99–110)
CO2: 23 MMOL/L (ref 21–32)
CREAT SERPL-MCNC: 1.5 MG/DL (ref 0.9–1.3)
DIFFERENTIAL TYPE: ABNORMAL
EOSINOPHILS ABSOLUTE: 0 K/CU MM
EOSINOPHILS RELATIVE PERCENT: 0.5 % (ref 0–3)
ERYTHROCYTE SEDIMENTATION RATE: 91 MM/HR (ref 0–20)
GFR AFRICAN AMERICAN: 54 ML/MIN/1.73M2
GFR NON-AFRICAN AMERICAN: 44 ML/MIN/1.73M2
GLUCOSE BLD-MCNC: 118 MG/DL (ref 70–99)
HCT VFR BLD CALC: 30 % (ref 42–52)
HEMOGLOBIN: 9.5 GM/DL (ref 13.5–18)
IMMATURE NEUTROPHIL %: 1.2 % (ref 0–0.43)
LYMPHOCYTES ABSOLUTE: 0.9 K/CU MM
LYMPHOCYTES RELATIVE PERCENT: 11.5 % (ref 24–44)
MCH RBC QN AUTO: 27.5 PG (ref 27–31)
MCHC RBC AUTO-ENTMCNC: 31.7 % (ref 32–36)
MCV RBC AUTO: 86.7 FL (ref 78–100)
MONOCYTES ABSOLUTE: 0.6 K/CU MM
MONOCYTES RELATIVE PERCENT: 7 % (ref 0–4)
NUCLEATED RBC %: 0 %
PDW BLD-RTO: 15 % (ref 11.7–14.9)
PLATELET # BLD: 122 K/CU MM (ref 140–440)
PMV BLD AUTO: 10.5 FL (ref 7.5–11.1)
POTASSIUM SERPL-SCNC: 4.3 MMOL/L (ref 3.5–5.1)
PROCALCITONIN: 0.14
RBC # BLD: 3.46 M/CU MM (ref 4.6–6.2)
SARS-COV-2: DETECTED
SEGMENTED NEUTROPHILS ABSOLUTE COUNT: 6.4 K/CU MM
SEGMENTED NEUTROPHILS RELATIVE PERCENT: 79.7 % (ref 36–66)
SODIUM BLD-SCNC: 138 MMOL/L (ref 135–145)
SOURCE: ABNORMAL
TOTAL IMMATURE NEUTOROPHIL: 0.1 K/CU MM
TOTAL NUCLEATED RBC: 0 K/CU MM
WBC # BLD: 8 K/CU MM (ref 4–10.5)

## 2020-08-30 PROCEDURE — 86141 C-REACTIVE PROTEIN HS: CPT

## 2020-08-30 PROCEDURE — 94761 N-INVAS EAR/PLS OXIMETRY MLT: CPT

## 2020-08-30 PROCEDURE — 1200000000 HC SEMI PRIVATE

## 2020-08-30 PROCEDURE — 94150 VITAL CAPACITY TEST: CPT

## 2020-08-30 PROCEDURE — 36415 COLL VENOUS BLD VENIPUNCTURE: CPT

## 2020-08-30 PROCEDURE — 84145 PROCALCITONIN (PCT): CPT

## 2020-08-30 PROCEDURE — 6370000000 HC RX 637 (ALT 250 FOR IP): Performed by: INTERNAL MEDICINE

## 2020-08-30 PROCEDURE — 85652 RBC SED RATE AUTOMATED: CPT

## 2020-08-30 PROCEDURE — 80048 BASIC METABOLIC PNL TOTAL CA: CPT

## 2020-08-30 PROCEDURE — 2580000003 HC RX 258: Performed by: INTERNAL MEDICINE

## 2020-08-30 PROCEDURE — U0002 COVID-19 LAB TEST NON-CDC: HCPCS

## 2020-08-30 PROCEDURE — 6360000002 HC RX W HCPCS: Performed by: INTERNAL MEDICINE

## 2020-08-30 PROCEDURE — 85025 COMPLETE CBC W/AUTO DIFF WBC: CPT

## 2020-08-30 RX ORDER — TRAMADOL HYDROCHLORIDE 50 MG/1
50 TABLET ORAL EVERY 6 HOURS PRN
Status: DISCONTINUED | OUTPATIENT
Start: 2020-08-30 | End: 2020-09-19 | Stop reason: HOSPADM

## 2020-08-30 RX ORDER — SODIUM CHLORIDE 450 MG/100ML
INJECTION, SOLUTION INTRAVENOUS CONTINUOUS
Status: DISCONTINUED | OUTPATIENT
Start: 2020-08-30 | End: 2020-08-31

## 2020-08-30 RX ADMIN — TRAMADOL HYDROCHLORIDE 50 MG: 50 TABLET, FILM COATED ORAL at 17:47

## 2020-08-30 RX ADMIN — FERROUS SULFATE TAB 325 MG (65 MG ELEMENTAL FE) 325 MG: 325 (65 FE) TAB at 10:06

## 2020-08-30 RX ADMIN — COLLAGENASE SANTYL: 250 OINTMENT TOPICAL at 06:49

## 2020-08-30 RX ADMIN — QUETIAPINE FUMARATE 100 MG: 100 TABLET ORAL at 21:15

## 2020-08-30 RX ADMIN — HEPARIN SODIUM 5000 UNITS: 5000 INJECTION, SOLUTION INTRAVENOUS; SUBCUTANEOUS at 16:54

## 2020-08-30 RX ADMIN — PANTOPRAZOLE SODIUM 40 MG: 40 TABLET, DELAYED RELEASE ORAL at 10:07

## 2020-08-30 RX ADMIN — SODIUM CHLORIDE: 4.5 INJECTION, SOLUTION INTRAVENOUS at 17:46

## 2020-08-30 RX ADMIN — ACETAMINOPHEN 650 MG: 325 TABLET ORAL at 10:30

## 2020-08-30 RX ADMIN — MIRTAZAPINE 45 MG: 15 TABLET, FILM COATED ORAL at 21:15

## 2020-08-30 RX ADMIN — HEPARIN SODIUM 5000 UNITS: 5000 INJECTION, SOLUTION INTRAVENOUS; SUBCUTANEOUS at 21:25

## 2020-08-30 RX ADMIN — QUETIAPINE FUMARATE 100 MG: 100 TABLET ORAL at 10:07

## 2020-08-30 RX ADMIN — PANTOPRAZOLE SODIUM 40 MG: 40 TABLET, DELAYED RELEASE ORAL at 16:54

## 2020-08-30 RX ADMIN — COLLAGENASE SANTYL: 250 OINTMENT TOPICAL at 17:05

## 2020-08-30 RX ADMIN — FERROUS SULFATE TAB 325 MG (65 MG ELEMENTAL FE) 325 MG: 325 (65 FE) TAB at 16:54

## 2020-08-30 RX ADMIN — SODIUM CHLORIDE, PRESERVATIVE FREE 10 ML: 5 INJECTION INTRAVENOUS at 10:07

## 2020-08-30 ASSESSMENT — PAIN SCALES - GENERAL
PAINLEVEL_OUTOF10: 6
PAINLEVEL_OUTOF10: 7
PAINLEVEL_OUTOF10: 4
PAINLEVEL_OUTOF10: 5
PAINLEVEL_OUTOF10: 0
PAINLEVEL_OUTOF10: 2
PAINLEVEL_OUTOF10: 0
PAINLEVEL_OUTOF10: 8
PAINLEVEL_OUTOF10: 0
PAINLEVEL_OUTOF10: 6
PAINLEVEL_OUTOF10: 6

## 2020-08-30 ASSESSMENT — PAIN DESCRIPTION - PROGRESSION
CLINICAL_PROGRESSION: GRADUALLY WORSENING

## 2020-08-30 ASSESSMENT — PAIN SCALES - WONG BAKER

## 2020-08-30 NOTE — PROGRESS NOTES
ambulating   GI Prophylaxis [] PPI, [] H2 Blocker, [] No GI prophylaxis, patient is receiving diet/Tube Feeds   Code Status Limited   Disposition Patient requires continued admission due to Sepsis/COVID   MDM [] Low, [x] Moderate,[]  High     History of Present Illness: Subjective     Patient Seen & Examined at the bedside     Patient is resting in bed with no distress while on room air - no new complaints     Ten point ROS reviewed negative, unless as noted above    Objective: Intake/Output Summary (Last 24 hours) at 8/30/2020 1032  Last data filed at 8/30/2020 1006  Gross per 24 hour   Intake 370 ml   Output 350 ml   Net 20 ml      Vitals:   Vitals:    08/30/20 1004   BP: 127/76   Pulse: 83   Resp: 16   Temp: 98.9 °F (37.2 °C)   SpO2: 92%     Physical Exam:    GEN Awake male, resting in bed in no apparent distress. Appears given age. RESP Clear to auscultation, no wheezes, rales or rhonchi. CARDIO/VASC -S1/S2 auscultated. Regular rate without appreciable murmurs, rubs, or gallops. Peripheral pulses equal bilaterally and palpable. No peripheral edema. GI Abdomen is soft without significant tenderness, masses, or guarding. Bowel sounds are normoactive. Rectal exam deferred.    SKIN Large decubitus -sacral stage IV   NEURO Confused and disoriented     Medications:   Medications:    collagenase   Topical BID    ferrous sulfate  325 mg Oral BID WC    sodium chloride flush  10 mL Intravenous 2 times per day    heparin (porcine)  5,000 Units Subcutaneous 3 times per day    [Held by provider] metoprolol succinate  25 mg Oral Daily    mirtazapine  45 mg Oral Nightly    QUEtiapine  100 mg Oral BID    pantoprazole  40 mg Oral BID AC      Infusions:   PRN Meds: sodium chloride flush, 10 mL, PRN  acetaminophen, 650 mg, Q6H PRN    Or  acetaminophen, 650 mg, Q6H PRN  polyethylene glycol, 17 g, Daily PRN  promethazine, 12.5 mg, Q6H PRN    Or  ondansetron, 4 mg, Q6H PRN          Electronically signed by Prerna Hutton Mary Anne Maradiaga MD on 8/30/2020 at 11:10 AM

## 2020-08-30 NOTE — PROGRESS NOTES
Coccyx wound changed at this time. Pt states he is in pain. Perfect serve sent to Dr. Marilyn Oconnor asking for pain meds. He only has tylenol ordered.

## 2020-08-30 NOTE — PLAN OF CARE
needs are met  Outcome: Ongoing     Problem: PAIN  Goal: Patient's pain/discomfort is manageable  Outcome: Ongoing     Problem: SKIN INTEGRITY  Goal: Skin integrity is maintained or improved  Outcome: Ongoing     Problem: KNOWLEDGE DEFICIT  Goal: Patient/S.O. demonstrates understanding of disease process, treatment plan, medications, and discharge instructions.   Outcome: Ongoing     Problem: DISCHARGE BARRIERS  Goal: Patient's continuum of care needs are met  Outcome: Ongoing     Problem: Pain:  Goal: Pain level will decrease  Description: Pain level will decrease  Outcome: Ongoing  Goal: Control of acute pain  Description: Control of acute pain  Outcome: Ongoing  Goal: Control of chronic pain  Description: Control of chronic pain  Outcome: Ongoing

## 2020-08-30 NOTE — PROGRESS NOTES
Pt platelets today were 122. Yesterday they were 194 and on 8/28 they were 64. State that I was holding heparin injection. Per Dr. Lanre Stanton, okay to give.

## 2020-08-31 LAB
ANION GAP SERPL CALCULATED.3IONS-SCNC: 11 MMOL/L (ref 4–16)
BASOPHILS ABSOLUTE: 0 K/CU MM
BASOPHILS RELATIVE PERCENT: 0 % (ref 0–1)
BUN BLDV-MCNC: 38 MG/DL (ref 6–23)
CALCIUM SERPL-MCNC: 8.2 MG/DL (ref 8.3–10.6)
CHLORIDE BLD-SCNC: 102 MMOL/L (ref 99–110)
CO2: 22 MMOL/L (ref 21–32)
CREAT SERPL-MCNC: 1.4 MG/DL (ref 0.9–1.3)
DIFFERENTIAL TYPE: ABNORMAL
EOSINOPHILS ABSOLUTE: 0 K/CU MM
EOSINOPHILS RELATIVE PERCENT: 0.4 % (ref 0–3)
GFR AFRICAN AMERICAN: 58 ML/MIN/1.73M2
GFR NON-AFRICAN AMERICAN: 48 ML/MIN/1.73M2
GLUCOSE BLD-MCNC: 103 MG/DL (ref 70–99)
HCT VFR BLD CALC: 30.4 % (ref 42–52)
HEMOGLOBIN: 9.3 GM/DL (ref 13.5–18)
IMMATURE NEUTROPHIL %: 0.9 % (ref 0–0.43)
LYMPHOCYTES ABSOLUTE: 0.9 K/CU MM
LYMPHOCYTES RELATIVE PERCENT: 12.1 % (ref 24–44)
MCH RBC QN AUTO: 26.6 PG (ref 27–31)
MCHC RBC AUTO-ENTMCNC: 30.6 % (ref 32–36)
MCV RBC AUTO: 86.9 FL (ref 78–100)
MONOCYTES ABSOLUTE: 0.6 K/CU MM
MONOCYTES RELATIVE PERCENT: 7.3 % (ref 0–4)
NUCLEATED RBC %: 0 %
PDW BLD-RTO: 14.9 % (ref 11.7–14.9)
PLATELET # BLD: 167 K/CU MM (ref 140–440)
PMV BLD AUTO: 9.9 FL (ref 7.5–11.1)
POTASSIUM SERPL-SCNC: 5 MMOL/L (ref 3.5–5.1)
RBC # BLD: 3.5 M/CU MM (ref 4.6–6.2)
REASON FOR REJECTION: NORMAL
REJECTED TEST: NORMAL
SEGMENTED NEUTROPHILS ABSOLUTE COUNT: 5.9 K/CU MM
SEGMENTED NEUTROPHILS RELATIVE PERCENT: 79.3 % (ref 36–66)
SODIUM BLD-SCNC: 135 MMOL/L (ref 135–145)
TOTAL IMMATURE NEUTOROPHIL: 0.07 K/CU MM
TOTAL NUCLEATED RBC: 0 K/CU MM
WBC # BLD: 7.5 K/CU MM (ref 4–10.5)

## 2020-08-31 PROCEDURE — 2580000003 HC RX 258: Performed by: INTERNAL MEDICINE

## 2020-08-31 PROCEDURE — 6370000000 HC RX 637 (ALT 250 FOR IP): Performed by: INTERNAL MEDICINE

## 2020-08-31 PROCEDURE — 94761 N-INVAS EAR/PLS OXIMETRY MLT: CPT

## 2020-08-31 PROCEDURE — 99232 SBSQ HOSP IP/OBS MODERATE 35: CPT | Performed by: INTERNAL MEDICINE

## 2020-08-31 PROCEDURE — 6360000002 HC RX W HCPCS: Performed by: INTERNAL MEDICINE

## 2020-08-31 PROCEDURE — 86141 C-REACTIVE PROTEIN HS: CPT

## 2020-08-31 PROCEDURE — 80048 BASIC METABOLIC PNL TOTAL CA: CPT

## 2020-08-31 PROCEDURE — 1200000000 HC SEMI PRIVATE

## 2020-08-31 PROCEDURE — 85025 COMPLETE CBC W/AUTO DIFF WBC: CPT

## 2020-08-31 RX ADMIN — ENOXAPARIN SODIUM 30 MG: 30 INJECTION SUBCUTANEOUS at 10:23

## 2020-08-31 RX ADMIN — ENOXAPARIN SODIUM 30 MG: 30 INJECTION SUBCUTANEOUS at 21:05

## 2020-08-31 RX ADMIN — PANTOPRAZOLE SODIUM 40 MG: 40 TABLET, DELAYED RELEASE ORAL at 05:14

## 2020-08-31 RX ADMIN — MIRTAZAPINE 45 MG: 15 TABLET, FILM COATED ORAL at 21:06

## 2020-08-31 RX ADMIN — QUETIAPINE FUMARATE 100 MG: 100 TABLET ORAL at 21:06

## 2020-08-31 RX ADMIN — TRAMADOL HYDROCHLORIDE 50 MG: 50 TABLET, FILM COATED ORAL at 21:06

## 2020-08-31 RX ADMIN — ACETAMINOPHEN 650 MG: 325 TABLET ORAL at 21:05

## 2020-08-31 RX ADMIN — METOPROLOL SUCCINATE 25 MG: 25 TABLET, EXTENDED RELEASE ORAL at 13:05

## 2020-08-31 RX ADMIN — COLLAGENASE SANTYL: 250 OINTMENT TOPICAL at 05:08

## 2020-08-31 RX ADMIN — QUETIAPINE FUMARATE 100 MG: 100 TABLET ORAL at 10:23

## 2020-08-31 RX ADMIN — FERROUS SULFATE TAB 325 MG (65 MG ELEMENTAL FE) 325 MG: 325 (65 FE) TAB at 10:23

## 2020-08-31 RX ADMIN — PANTOPRAZOLE SODIUM 40 MG: 40 TABLET, DELAYED RELEASE ORAL at 18:24

## 2020-08-31 RX ADMIN — SODIUM CHLORIDE, PRESERVATIVE FREE 10 ML: 5 INJECTION INTRAVENOUS at 21:06

## 2020-08-31 RX ADMIN — HEPARIN SODIUM 5000 UNITS: 5000 INJECTION, SOLUTION INTRAVENOUS; SUBCUTANEOUS at 05:14

## 2020-08-31 RX ADMIN — COLLAGENASE SANTYL: 250 OINTMENT TOPICAL at 18:50

## 2020-08-31 RX ADMIN — FERROUS SULFATE TAB 325 MG (65 MG ELEMENTAL FE) 325 MG: 325 (65 FE) TAB at 18:24

## 2020-08-31 ASSESSMENT — PAIN DESCRIPTION - DESCRIPTORS: DESCRIPTORS: DISCOMFORT;CONSTANT

## 2020-08-31 ASSESSMENT — PAIN SCALES - GENERAL: PAINLEVEL_OUTOF10: 7

## 2020-08-31 ASSESSMENT — PAIN DESCRIPTION - FREQUENCY: FREQUENCY: INTERMITTENT

## 2020-08-31 ASSESSMENT — PAIN DESCRIPTION - LOCATION: LOCATION: BUTTOCKS

## 2020-08-31 NOTE — PROGRESS NOTES
Infectious Disease Progress Note  2020   Patient Name: Trudi Goldstein : 1932       Reason for visit: F/u COVID-19, stage IV sacral decubitus ulcer ? History:? Interval history noted  Dementia, denies any complaints. Physical Exam:  Vital Signs: BP (!) 128/57   Pulse 81   Temp 100.5 °F (38.1 °C) (Oral)   Resp 22   Ht 5' 8\" (1.727 m)   Wt 189 lb 11.2 oz (86 kg)   SpO2 94%   BMI 28.84 kg/m²     Gen: alert and oriented, memory deficits  Skin: no stigmata of endocarditis  Wounds: Stage IV sacral decubitus ulcer, with areas of necrosis   HEMT: AT/NC Oropharynx pink, moist, and without lesions or exudates; dentition in good state of repair  Eyes: PERRLA, EOMI, conjunctiva pink, sclera anicteric. Neck: Supple. Trachea midline. No LAD. Chest: no distress and CTA. Good air movement. Heart: RRR and no MRG. Abd: soft, non-distended, no tenderness, no hepatomegaly. Normoactive bowel sounds. Ext: no clubbing, cyanosis, or edema  Catheter Site: without erythema or tenderness  Neuro: Mental status intact. CN 2-12 intact and no focal sensory or motor deficits     Radiologic / Imaging / TESTING  No results found.      Labs:    Recent Results (from the past 24 hour(s))   Basic Metabolic Panel w/ Reflex to MG    Collection Time: 20  5:30 AM   Result Value Ref Range    Sodium 135 135 - 145 MMOL/L    Potassium 5.0 3.5 - 5.1 MMOL/L    Chloride 102 99 - 110 mMol/L    CO2 22 21 - 32 MMOL/L    Anion Gap 11 4 - 16    BUN 38 (H) 6 - 23 MG/DL    CREATININE 1.4 (H) 0.9 - 1.3 MG/DL    Glucose 103 (H) 70 - 99 MG/DL    Calcium 8.2 (L) 8.3 - 10.6 MG/DL    GFR Non- 48 (L) >60 mL/min/1.73m2    GFR  58 (L) >60 mL/min/1.73m2   SPECIMEN REJECTION    Collection Time: 20  5:30 AM   Result Value Ref Range    Rejected Test CD     Reason for Rejection UNABLE TO PERFORM TESTING:      CULTURE results: Invalid input(s): BLOOD CULTURE,  URINE CULTURE, SURGICAL CULTURE    Diagnosis:  Patient Active Problem List   Diagnosis    Pneumonia due to organism    [facility]    Essential hypertension, benign    Depressive disorder, not elsewhere classified    Acute renal failure (HCC)    Metabolic encephalopathy    SOB (shortness of breath)    Elevated liver enzymes    Acute respiratory failure (HCC)    Stage III pressure ulcer of sacral region ([facility]    Pneumonia due to COVID-19 virus       Active Problems  Active Problems:    Acute respiratory failure (HCC)    Stage III pressure ulcer of sacral region ([facility]    Pneumonia due to COVID-19 virus  Resolved Problems:    * No resolved hospital problems. *      Impression and plan   Clinical status: fever   Therapeutic:   Diagnostic: repeat blood cx if fever recurs, check CRP and pct   F/u:   Other:   Summary: repeat SARS-CoV-2 test positive on 8/21 and 8/29.  If fever recurs      Electronically signed by: Electronically signed by Altha Habermann, MD on 8/31/2020 at 12:54 PM

## 2020-08-31 NOTE — PROGRESS NOTES
[address]    Hospitalist Progress Note      Name:  Luz Maria Webb /Age/Sex: 1932  (80 y.o. male)   MRN & CSN:  [id number] & [id number] Admission Date/Time: 2020  1:17 AM   Location:  -A PCP: Vivien Alamo MD         Hospital Day: 11    Assessment and Plan:   Luz Maria Webb is a 80 y.o.  male  who presents with shortness of breath     Acute hypoxic respiratory failure due to COVID pneumonia - resolving     Was recently treated for community-acquired pneumonia and sent to SNF  Chest x-ray on admission shows bibasilar atelectasis   ECHO with normal LVEF, Leukocytosis resolved  SARS-COV2 detected on  and  - (persitently positive)   Trend procalcitonin and CRP  Saturating in the 90s on room air since  -  Dexamethasone was discontinued by ID     Sepsis likely due to sacral decubitus ulcers, unstageable, present on admission  S/p I and D at bedside     Blood cultures negative to date, surgical culture pending   Surgery on board  Wound ostomy -local wound care   IV Ab ( vancomycin and cefepime) Dc per ID   Sepsis was ruled out    Confirmed COVID -23      SARS-COV2 detected on  was not detected on  - off oxygen and on room air - LFT high for remdesevir   Continue with Dexamethasone      Acute kidney injury on CKD 3 -resolved with hydration - DC fluids      Elevated liver enzymes -Stable    Recent hepatitis panel negative  Recommend follow-up as outpatient with GI     Elevated d-dimer -VQ scan was done on admission, shows low probability of PE      Chronic issues    Hypertension   Depression  COPD, not in exacerbation  Moderate PCM   Anemia of chronic disease     CODE STATUS -full code     Patient stable for discharge to SNF pending COVID negative results and precert     Diet DIET CARDIAC;   Dietary Nutrition Supplements: Wound Healing Oral Supplement  Dietary Nutrition Supplements: Standard High Calorie Oral Supplement   DVT Prophylaxis [] Lovenox, []  Heparin, [] SCDs, []No VTE prophylaxis, patient ambulating   GI Prophylaxis [] PPI, [] H2 Blocker, [] No GI prophylaxis, patient is receiving diet/Tube Feeds   Code Status Limited   Disposition Patient requires continued admission due to Sepsis/COVID   MDM [] Low, [x] Moderate,[]  High     History of Present Illness: Subjective     Patient Seen & Examined at the bedside     Patient is resting in bed with no distress while on room air - no new complaints  States he is feeling ok      Ten point ROS reviewed negative, unless as noted above    Objective: Intake/Output Summary (Last 24 hours) at 8/31/2020 0927  Last data filed at 8/31/2020 0511  Gross per 24 hour   Intake 989 ml   Output 1800 ml   Net -811 ml      Vitals:   Vitals:    08/31/20 0510   BP: 130/67   Pulse: 82   Resp: 23   Temp: 100 °F (37.8 °C)   SpO2:      Physical Exam:    GEN Awake male, resting in bed in no apparent distress. Appears given age. RESP Clear to auscultation, no wheezes, rales or rhonchi. CARDIO/VASC -S1/S2 auscultated. Regular rate without appreciable murmurs, rubs, or gallops. Peripheral pulses equal bilaterally and palpable. No peripheral edema. GI Abdomen is soft without significant tenderness, masses, or guarding. Bowel sounds are normoactive. Rectal exam deferred.    SKIN Large decubitus -sacral stage IV   NEURO Confused and disoriented but calm     Medications:   Medications:    enoxaparin  30 mg Subcutaneous BID    collagenase   Topical BID    ferrous sulfate  325 mg Oral BID WC    sodium chloride flush  10 mL Intravenous 2 times per day    metoprolol succinate  25 mg Oral Daily    mirtazapine  45 mg Oral Nightly    QUEtiapine  100 mg Oral BID    pantoprazole  40 mg Oral BID AC      Infusions:   PRN Meds: traMADol, 50 mg, Q6H PRN  sodium chloride flush, 10 mL, PRN  acetaminophen, 650 mg, Q6H PRN    Or  acetaminophen, 650 mg, Q6H PRN  polyethylene glycol, 17 g, Daily PRN  promethazine, 12.5 mg, Q6H PRN    Or  ondansetron, 4 mg, Q6H PRN          Electronically signed by Mariano Reynolds MD on 8/31/2020 at 11:10 AM

## 2020-08-31 NOTE — PROGRESS NOTES
Notified SHANI Saeed NP of Pt's platelet count being 122. Pt is scheduled for evening heparin injection. Per SHANI Saeed NP ok to give evening heparin injection.

## 2020-09-01 ENCOUNTER — APPOINTMENT (OUTPATIENT)
Dept: GENERAL RADIOLOGY | Age: 85
DRG: 853 | End: 2020-09-01
Payer: MEDICARE

## 2020-09-01 LAB
ANION GAP SERPL CALCULATED.3IONS-SCNC: 12 MMOL/L (ref 4–16)
BASOPHILS ABSOLUTE: 0 K/CU MM
BASOPHILS RELATIVE PERCENT: 0.1 % (ref 0–1)
BUN BLDV-MCNC: 42 MG/DL (ref 6–23)
CALCIUM SERPL-MCNC: 8.4 MG/DL (ref 8.3–10.6)
CHLORIDE BLD-SCNC: 98 MMOL/L (ref 99–110)
CO2: 25 MMOL/L (ref 21–32)
CREAT SERPL-MCNC: 1.4 MG/DL (ref 0.9–1.3)
DIFFERENTIAL TYPE: ABNORMAL
EOSINOPHILS ABSOLUTE: 0 K/CU MM
EOSINOPHILS RELATIVE PERCENT: 0.4 % (ref 0–3)
GFR AFRICAN AMERICAN: 58 ML/MIN/1.73M2
GFR NON-AFRICAN AMERICAN: 48 ML/MIN/1.73M2
GLUCOSE BLD-MCNC: 109 MG/DL (ref 70–99)
HCT VFR BLD CALC: 33.4 % (ref 42–52)
HEMOGLOBIN: 10 GM/DL (ref 13.5–18)
HIGH SENSITIVE C-REACTIVE PROTEIN: 60.2 MG/L
HIGH SENSITIVE C-REACTIVE PROTEIN: 80.9 MG/L
HIGH SENSITIVE C-REACTIVE PROTEIN: 99.7 MG/L
IMMATURE NEUTROPHIL %: 0.6 % (ref 0–0.43)
LYMPHOCYTES ABSOLUTE: 0.8 K/CU MM
LYMPHOCYTES RELATIVE PERCENT: 10.4 % (ref 24–44)
MCH RBC QN AUTO: 26.8 PG (ref 27–31)
MCHC RBC AUTO-ENTMCNC: 29.9 % (ref 32–36)
MCV RBC AUTO: 89.5 FL (ref 78–100)
MONOCYTES ABSOLUTE: 0.6 K/CU MM
MONOCYTES RELATIVE PERCENT: 7.4 % (ref 0–4)
NUCLEATED RBC %: 0 %
PDW BLD-RTO: 14.9 % (ref 11.7–14.9)
PLATELET # BLD: 169 K/CU MM (ref 140–440)
PMV BLD AUTO: 9.9 FL (ref 7.5–11.1)
POTASSIUM SERPL-SCNC: 4.2 MMOL/L (ref 3.5–5.1)
PROCALCITONIN: 0.17
RBC # BLD: 3.73 M/CU MM (ref 4.6–6.2)
SARS-COV-2: DETECTED
SEGMENTED NEUTROPHILS ABSOLUTE COUNT: 6.4 K/CU MM
SEGMENTED NEUTROPHILS RELATIVE PERCENT: 81.1 % (ref 36–66)
SODIUM BLD-SCNC: 135 MMOL/L (ref 135–145)
SOURCE: ABNORMAL
TOTAL IMMATURE NEUTOROPHIL: 0.05 K/CU MM
TOTAL NUCLEATED RBC: 0 K/CU MM
WBC # BLD: 7.9 K/CU MM (ref 4–10.5)

## 2020-09-01 PROCEDURE — 6370000000 HC RX 637 (ALT 250 FOR IP): Performed by: INTERNAL MEDICINE

## 2020-09-01 PROCEDURE — 2580000003 HC RX 258: Performed by: INTERNAL MEDICINE

## 2020-09-01 PROCEDURE — 6360000002 HC RX W HCPCS: Performed by: INTERNAL MEDICINE

## 2020-09-01 PROCEDURE — 36415 COLL VENOUS BLD VENIPUNCTURE: CPT

## 2020-09-01 PROCEDURE — 80048 BASIC METABOLIC PNL TOTAL CA: CPT

## 2020-09-01 PROCEDURE — U0002 COVID-19 LAB TEST NON-CDC: HCPCS

## 2020-09-01 PROCEDURE — 87040 BLOOD CULTURE FOR BACTERIA: CPT

## 2020-09-01 PROCEDURE — 86141 C-REACTIVE PROTEIN HS: CPT

## 2020-09-01 PROCEDURE — 71045 X-RAY EXAM CHEST 1 VIEW: CPT

## 2020-09-01 PROCEDURE — 84145 PROCALCITONIN (PCT): CPT

## 2020-09-01 PROCEDURE — 99232 SBSQ HOSP IP/OBS MODERATE 35: CPT | Performed by: INTERNAL MEDICINE

## 2020-09-01 PROCEDURE — 85025 COMPLETE CBC W/AUTO DIFF WBC: CPT

## 2020-09-01 PROCEDURE — 1200000000 HC SEMI PRIVATE

## 2020-09-01 RX ADMIN — FERROUS SULFATE TAB 325 MG (65 MG ELEMENTAL FE) 325 MG: 325 (65 FE) TAB at 15:59

## 2020-09-01 RX ADMIN — ENOXAPARIN SODIUM 30 MG: 30 INJECTION SUBCUTANEOUS at 21:19

## 2020-09-01 RX ADMIN — ENOXAPARIN SODIUM 30 MG: 30 INJECTION SUBCUTANEOUS at 08:20

## 2020-09-01 RX ADMIN — COLLAGENASE SANTYL: 250 OINTMENT TOPICAL at 05:59

## 2020-09-01 RX ADMIN — COLLAGENASE SANTYL: 250 OINTMENT TOPICAL at 15:59

## 2020-09-01 RX ADMIN — MIRTAZAPINE 45 MG: 15 TABLET, FILM COATED ORAL at 21:21

## 2020-09-01 RX ADMIN — QUETIAPINE FUMARATE 100 MG: 100 TABLET ORAL at 08:19

## 2020-09-01 RX ADMIN — SODIUM CHLORIDE, PRESERVATIVE FREE 10 ML: 5 INJECTION INTRAVENOUS at 21:22

## 2020-09-01 RX ADMIN — SODIUM CHLORIDE, PRESERVATIVE FREE 10 ML: 5 INJECTION INTRAVENOUS at 08:20

## 2020-09-01 RX ADMIN — PANTOPRAZOLE SODIUM 40 MG: 40 TABLET, DELAYED RELEASE ORAL at 05:59

## 2020-09-01 RX ADMIN — FERROUS SULFATE TAB 325 MG (65 MG ELEMENTAL FE) 325 MG: 325 (65 FE) TAB at 08:20

## 2020-09-01 RX ADMIN — TRAMADOL HYDROCHLORIDE 50 MG: 50 TABLET, FILM COATED ORAL at 15:59

## 2020-09-01 RX ADMIN — QUETIAPINE FUMARATE 100 MG: 100 TABLET ORAL at 21:19

## 2020-09-01 RX ADMIN — METOPROLOL SUCCINATE 25 MG: 25 TABLET, EXTENDED RELEASE ORAL at 08:19

## 2020-09-01 RX ADMIN — PANTOPRAZOLE SODIUM 40 MG: 40 TABLET, DELAYED RELEASE ORAL at 15:59

## 2020-09-01 ASSESSMENT — PAIN SCALES - GENERAL: PAINLEVEL_OUTOF10: 7

## 2020-09-01 ASSESSMENT — PAIN SCALES - WONG BAKER
WONGBAKER_NUMERICALRESPONSE: 0

## 2020-09-01 ASSESSMENT — PAIN DESCRIPTION - PAIN TYPE: TYPE: ACUTE PAIN

## 2020-09-01 ASSESSMENT — PAIN DESCRIPTION - LOCATION: LOCATION: BUTTOCKS;SACRUM

## 2020-09-01 NOTE — CARE COORDINATION
Phoned and spoke with Abeba Gooden at [facility] to inquire on their  sister facility in [facility] for patient since patient continues to test positive for COVID. She confirmed that their facility in [facility] has a COVID unit and will initiate the referral for patient. She stated that patient will need PT/OT for precert. Therapy evals completed on 8/29/20 recommended SNF . White board request for updated treatment note for precert.

## 2020-09-01 NOTE — PROGRESS NOTES
Hospitalist Progress Note      Name:  Colin Lemon /Age/Sex: 1932  (80 y.o. male)   MRN & CSN:  [id number] & [id number] Admission Date/Time: 2020  1:17 AM   Location:  -A PCP: Demetrio Navarro MD         Hospital Day: 12    Assessment and Plan:   Colin Lemon is a 80 y.o.  male  who presents with <principal problem not specified>    > Acute hypoxic respiratory failure due to COVID pneumonia - resolved  Was recently treated for community-acquired pneumonia and sent to [facility]  Chest x-ray on admission shows bibasilar atelectasis   ECHO with normal LVEF, Leukocytosis resolved  SARS-COV2 detected on  and  - (persitently positive)   Trend procalcitonin and CRP  Saturating in the 90s on room air since  -  Dexamethasone was discontinued by ID  Repeat covid positive, persistent fever, repeat CXR and blood cx ordered.      >Sepsis likely due to sacral decubitus ulcers, unstageable, present on admission  S/p I and D at bedside    Blood cultures negative to date, surgical culture pending   Surgery on board  Wound ostomy -local wound care   IV Ab ( vancomycin and cefepime) Dc per ID   Sepsis was ruled out     >Confirmed COVID -19     SARS-COV2 detected on  - off oxygen and on room air, s/p Dexamethasone  - LFT high for remdesevir  Repeat covid positive.          >Acute kidney injury on CKD 3 -resolved with hydration - DC fluids      >Elevated liver enzymes -Stable   Recent hepatitis panel negative  Recommend follow-up as outpatient with GI     >Elevated d-dimer -VQ scan was done on admission, shows low probability of PE      >Chronic issues   Hypertension   Depression  COPD, not in exacerbation  Moderate PCM   Anemia of chronic disease     Diet DIET CARDIAC; Dietary Nutrition Supplements: Wound Healing Oral Supplement  Dietary Nutrition Supplements: Standard High Calorie Oral Supplement   DVT Prophylaxis ? Lovenox   GI Prophylaxis ?  PPI   Code Status Limited   Disposition  back to SNF pending negative covid and fever free     History of Present Illness:     Pt S&E. No abd pain, no N/V, no F/C, no chest pain, no dyspnea. 10-14 point ROS reviewed negative, unless as noted above    Objective: Intake/Output Summary (Last 24 hours) at 9/1/2020 1100  Last data filed at 9/1/2020 1023  Gross per 24 hour   Intake 1103 ml   Output 1800 ml   Net -697 ml      Vitals:   Vitals:    09/01/20 0803   BP: 139/67   Pulse: 82   Resp: 25   Temp: 100.2 °F (37.9 °C)   SpO2: 92%     Physical Exam:      GEN Awake male, cooperative, no apparent distress. RESP Decreased air sounds . Symmetric chest movement . CARDIO/VASC  Regular rate. GI Abdomen is soft without significant tenderness  MSK No gross joint deformities. SKIN Sacral Decubitus ulcer with clean edges, no thick discharge, no edema  NEURO normal speech,  PSYCH Awake, alert, oriented. Affect appropriate.     Medications:   Medications:    enoxaparin  30 mg Subcutaneous BID    collagenase   Topical BID    ferrous sulfate  325 mg Oral BID WC    sodium chloride flush  10 mL Intravenous 2 times per day    metoprolol succinate  25 mg Oral Daily    mirtazapine  45 mg Oral Nightly    QUEtiapine  100 mg Oral BID    pantoprazole  40 mg Oral BID AC      Infusions:   PRN Meds: traMADol, 50 mg, Q6H PRN  sodium chloride flush, 10 mL, PRN  acetaminophen, 650 mg, Q6H PRN    Or  acetaminophen, 650 mg, Q6H PRN  polyethylene glycol, 17 g, Daily PRN  promethazine, 12.5 mg, Q6H PRN    Or  ondansetron, 4 mg, Q6H PRN          Electronically signed by Aracelis Kenney MD on 9/1/2020 at 11:00 AM

## 2020-09-01 NOTE — PROGRESS NOTES
Comprehensive Nutrition Assessment    Type and Reason for Visit:  Reassess    Nutrition Recommendations/Plan:   Continue current diet and ONS as ordered   Will closely monitor po intake, nutrition status, poc    Nutrition Assessment:  pt remains on cardiac diet w/ ONS, tolerating current diet regimen, pt is high risk at this time    Malnutrition Assessment:  Malnutrition Status: At risk for malnutrition (Comment)    Context:  Acute Illness     Findings of the 6 clinical characteristics of malnutrition:  Energy Intake:  1 - 75% or less of estimated energy requirements for 7 or more days  Weight Loss:  Unable to assess     Body Fat Loss:  Unable to assess     Muscle Mass Loss:  Unable to assess    Fluid Accumulation:  Unable to assess     Strength:  Not Performed    Estimated Daily Nutrient Needs:  Energy (kcal):  0273-6087; Weight Used for Energy Requirements:  Current     Protein (g):  105; Weight Used for Protein Requirements:  Ideal        Fluid (ml/day):  8808-1611; Weight Used for Fluid Requirements:  Current      Nutrition Related Findings:  none      Wounds:  Pressure Ulcer       Current Nutrition Therapies:    DIET CARDIAC; Dietary Nutrition Supplements: Wound Healing Oral Supplement  Dietary Nutrition Supplements: Standard High Calorie Oral Supplement    Anthropometric Measures:  · Height: 5' 7.99\" (172.7 cm)  · Current Body Weight: 189 lb 9.5 oz (86 kg)   · Admission Body Weight: 197 lb 8.5 oz (89.6 kg)    · Usual Body Weight: 196 lb (88.9 kg)     · Ideal Body Weight: 154 lbs; % Ideal Body Weight 123.1 %   · BMI: 28.8  · Adjusted Body Weight:  ; No Adjustment   · BMI Categories: Overweight (BMI 25.0-29. 9)       Nutrition Diagnosis:   · Inadequate energy intake related to acute injury/trauma as evidenced by intake 0-25%, wounds    Nutrition Interventions:   Food and/or Nutrient Delivery:  Continue Current Diet, Continue Oral Nutrition Supplement  Nutrition Education/Counseling:  No recommendation at this time   Coordination of Nutrition Care:  Continued Inpatient Monitoring    Goals:  pt will consume greater than 75% of meals and supplements       Nutrition Monitoring and Evaluation:   Food/Nutrient Intake Outcomes:  Supplement Intake, Food and Nutrient Intake  Physical Signs/Symptoms Outcomes:  Biochemical Data, GI Status, Weight, Skin     Discharge Planning:     Too soon to determine     Electronically signed by Sha Hernandez MS, RD, LD on 9/1/20 at 1:40 PM EDT    Contact: [phone]

## 2020-09-01 NOTE — PROGRESS NOTES
COVID-19 virus  Resolved Problems:    * No resolved hospital problems. *      Impression and plan   Clinical status: fever,   Therapeutic:   Diagnostic: Repeat blood culture, MRSA nares, chest x-ray CRP and procalcitonin are yet to be done.  F/u:   Other:   Summary: repeat SARS-CoV-2 test positive on 8/21 and 8/29. COVID-19 test once more repeated and remains positive. Yet to be afebrile. Concerned about secondary bacterial infection especially as the patient has decubitus ulcer or secondary infection of pneumonia.       Electronically signed by: Electronically signed by Polina Cage MD on 9/1/2020 at 10:00 AM

## 2020-09-02 LAB
ANION GAP SERPL CALCULATED.3IONS-SCNC: 11 MMOL/L (ref 4–16)
BASOPHILS ABSOLUTE: 0 K/CU MM
BASOPHILS RELATIVE PERCENT: 0.1 % (ref 0–1)
BUN BLDV-MCNC: 38 MG/DL (ref 6–23)
CALCIUM SERPL-MCNC: 8.3 MG/DL (ref 8.3–10.6)
CHLORIDE BLD-SCNC: 99 MMOL/L (ref 99–110)
CO2: 26 MMOL/L (ref 21–32)
CREAT SERPL-MCNC: 1.4 MG/DL (ref 0.9–1.3)
DIFFERENTIAL TYPE: ABNORMAL
EOSINOPHILS ABSOLUTE: 0 K/CU MM
EOSINOPHILS RELATIVE PERCENT: 0.3 % (ref 0–3)
GFR AFRICAN AMERICAN: 58 ML/MIN/1.73M2
GFR NON-AFRICAN AMERICAN: 48 ML/MIN/1.73M2
GLUCOSE BLD-MCNC: 107 MG/DL (ref 70–99)
HCT VFR BLD CALC: 29.4 % (ref 42–52)
HEMOGLOBIN: 9.1 GM/DL (ref 13.5–18)
HIGH SENSITIVE C-REACTIVE PROTEIN: 98.8 MG/L
IMMATURE NEUTROPHIL %: 0.8 % (ref 0–0.43)
LYMPHOCYTES ABSOLUTE: 1.1 K/CU MM
LYMPHOCYTES RELATIVE PERCENT: 15.1 % (ref 24–44)
MCH RBC QN AUTO: 26.8 PG (ref 27–31)
MCHC RBC AUTO-ENTMCNC: 31 % (ref 32–36)
MCV RBC AUTO: 86.5 FL (ref 78–100)
MONOCYTES ABSOLUTE: 0.6 K/CU MM
MONOCYTES RELATIVE PERCENT: 8.7 % (ref 0–4)
NUCLEATED RBC %: 0 %
PDW BLD-RTO: 15 % (ref 11.7–14.9)
PLATELET # BLD: 192 K/CU MM (ref 140–440)
PMV BLD AUTO: 10.3 FL (ref 7.5–11.1)
POTASSIUM SERPL-SCNC: 4.6 MMOL/L (ref 3.5–5.1)
PROCALCITONIN: 0.18
RBC # BLD: 3.4 M/CU MM (ref 4.6–6.2)
SARS-COV-2: DETECTED
SEGMENTED NEUTROPHILS ABSOLUTE COUNT: 5.5 K/CU MM
SEGMENTED NEUTROPHILS RELATIVE PERCENT: 75 % (ref 36–66)
SODIUM BLD-SCNC: 136 MMOL/L (ref 135–145)
SOURCE: ABNORMAL
TOTAL IMMATURE NEUTOROPHIL: 0.06 K/CU MM
TOTAL NUCLEATED RBC: 0 K/CU MM
WBC # BLD: 7.4 K/CU MM (ref 4–10.5)

## 2020-09-02 PROCEDURE — 1200000000 HC SEMI PRIVATE

## 2020-09-02 PROCEDURE — 94761 N-INVAS EAR/PLS OXIMETRY MLT: CPT

## 2020-09-02 PROCEDURE — 97112 NEUROMUSCULAR REEDUCATION: CPT

## 2020-09-02 PROCEDURE — 97530 THERAPEUTIC ACTIVITIES: CPT

## 2020-09-02 PROCEDURE — 6370000000 HC RX 637 (ALT 250 FOR IP): Performed by: INTERNAL MEDICINE

## 2020-09-02 PROCEDURE — 97535 SELF CARE MNGMENT TRAINING: CPT

## 2020-09-02 PROCEDURE — 6360000002 HC RX W HCPCS: Performed by: INTERNAL MEDICINE

## 2020-09-02 PROCEDURE — 99232 SBSQ HOSP IP/OBS MODERATE 35: CPT | Performed by: INTERNAL MEDICINE

## 2020-09-02 PROCEDURE — 2580000003 HC RX 258: Performed by: INTERNAL MEDICINE

## 2020-09-02 PROCEDURE — 85025 COMPLETE CBC W/AUTO DIFF WBC: CPT

## 2020-09-02 PROCEDURE — 80048 BASIC METABOLIC PNL TOTAL CA: CPT

## 2020-09-02 PROCEDURE — 94150 VITAL CAPACITY TEST: CPT

## 2020-09-02 PROCEDURE — 86141 C-REACTIVE PROTEIN HS: CPT

## 2020-09-02 PROCEDURE — 36415 COLL VENOUS BLD VENIPUNCTURE: CPT

## 2020-09-02 PROCEDURE — 84145 PROCALCITONIN (PCT): CPT

## 2020-09-02 RX ADMIN — MIRTAZAPINE 45 MG: 15 TABLET, FILM COATED ORAL at 23:11

## 2020-09-02 RX ADMIN — PANTOPRAZOLE SODIUM 40 MG: 40 TABLET, DELAYED RELEASE ORAL at 05:46

## 2020-09-02 RX ADMIN — TRAMADOL HYDROCHLORIDE 50 MG: 50 TABLET, FILM COATED ORAL at 04:32

## 2020-09-02 RX ADMIN — METOPROLOL SUCCINATE 25 MG: 25 TABLET, EXTENDED RELEASE ORAL at 08:26

## 2020-09-02 RX ADMIN — ENOXAPARIN SODIUM 30 MG: 30 INJECTION SUBCUTANEOUS at 23:11

## 2020-09-02 RX ADMIN — PANTOPRAZOLE SODIUM 40 MG: 40 TABLET, DELAYED RELEASE ORAL at 16:00

## 2020-09-02 RX ADMIN — FERROUS SULFATE TAB 325 MG (65 MG ELEMENTAL FE) 325 MG: 325 (65 FE) TAB at 08:26

## 2020-09-02 RX ADMIN — COLLAGENASE SANTYL: 250 OINTMENT TOPICAL at 04:26

## 2020-09-02 RX ADMIN — SODIUM CHLORIDE, PRESERVATIVE FREE 10 ML: 5 INJECTION INTRAVENOUS at 23:12

## 2020-09-02 RX ADMIN — TRAMADOL HYDROCHLORIDE 50 MG: 50 TABLET, FILM COATED ORAL at 23:11

## 2020-09-02 RX ADMIN — QUETIAPINE FUMARATE 100 MG: 100 TABLET ORAL at 23:11

## 2020-09-02 RX ADMIN — QUETIAPINE FUMARATE 100 MG: 100 TABLET ORAL at 08:26

## 2020-09-02 RX ADMIN — FERROUS SULFATE TAB 325 MG (65 MG ELEMENTAL FE) 325 MG: 325 (65 FE) TAB at 16:00

## 2020-09-02 RX ADMIN — SODIUM CHLORIDE, PRESERVATIVE FREE 10 ML: 5 INJECTION INTRAVENOUS at 08:26

## 2020-09-02 RX ADMIN — ENOXAPARIN SODIUM 30 MG: 30 INJECTION SUBCUTANEOUS at 08:27

## 2020-09-02 RX ADMIN — COLLAGENASE SANTYL: 250 OINTMENT TOPICAL at 16:00

## 2020-09-02 ASSESSMENT — PAIN SCALES - GENERAL
PAINLEVEL_OUTOF10: 7
PAINLEVEL_OUTOF10: 8

## 2020-09-02 ASSESSMENT — PAIN DESCRIPTION - PAIN TYPE: TYPE: ACUTE PAIN

## 2020-09-02 ASSESSMENT — PAIN SCALES - WONG BAKER
WONGBAKER_NUMERICALRESPONSE: 0
WONGBAKER_NUMERICALRESPONSE: 6
WONGBAKER_NUMERICALRESPONSE: 0

## 2020-09-02 ASSESSMENT — PAIN DESCRIPTION - LOCATION: LOCATION: BUTTOCKS;SACRUM

## 2020-09-02 NOTE — PROGRESS NOTES
Infectious Disease Progress Note  2020   Patient Name: Sharon Cazares : 1932       Reason for visit: F/u COVID-19, stage IV sacral decubitus ulcer ? History:? Interval history noted  Dementia, denies any complaints. Physical Exam:  Vital Signs: BP (!) 109/98   Pulse 88   Temp 99.2 °F (37.3 °C) (Oral)   Resp 21   Ht 5' 7.99\" (1.727 m)   Wt 189 lb 11.2 oz (86 kg)   SpO2 95%   BMI 28.85 kg/m²     Gen: alert and oriented, memory deficits  Skin: no stigmata of endocarditis  Wounds: Stage IV sacral decubitus ulcer, with areas of necrosis   HEMT: AT/NC Oropharynx pink, moist, and without lesions or exudates; dentition in good state of repair  Eyes: PERRLA, EOMI, conjunctiva pink, sclera anicteric. Neck: Supple. Trachea midline. No LAD. Chest: no distress and CTA. Good air movement. Heart: RRR and no MRG. Abd: soft, non-distended, no tenderness, no hepatomegaly. Normoactive bowel sounds. Ext: no clubbing, cyanosis, or edema  Catheter Site: without erythema or tenderness  Neuro: Mental status intact. CN 2-12 intact and no focal sensory or motor deficits     Radiologic / Imaging / TESTING  No results found.      Labs:    Recent Results (from the past 24 hour(s))   Procalcitonin    Collection Time: 20 12:39 AM   Result Value Ref Range    Procalcitonin 3.892    Basic Metabolic Panel w/ Reflex to MG    Collection Time: 20 12:39 AM   Result Value Ref Range    Sodium 136 135 - 145 MMOL/L    Potassium 4.6 3.5 - 5.1 MMOL/L    Chloride 99 99 - 110 mMol/L    CO2 26 21 - 32 MMOL/L    Anion Gap 11 4 - 16    BUN 38 (H) 6 - 23 MG/DL    CREATININE 1.4 (H) 0.9 - 1.3 MG/DL    Glucose 107 (H) 70 - 99 MG/DL    Calcium 8.3 8.3 - 10.6 MG/DL    GFR Non- 48 (L) >60 mL/min/1.73m2    GFR  58 (L) >60 mL/min/1.73m2   CBC auto differential    Collection Time: 20 12:39 AM   Result Value Ref Range    WBC 7.4 4.0 - 10.5 K/CU MM    RBC 3.40 (L) 4.6 - 6.2 M/CU MM    Hemoglobin 9.1 (L) 13.5 - 18.0 GM/DL    Hematocrit 29.4 (L) 42 - 52 %    MCV 86.5 78 - 100 FL    MCH 26.8 (L) 27 - 31 PG    MCHC 31.0 (L) 32.0 - 36.0 %    RDW 15.0 (H) 11.7 - 14.9 %    Platelets 073 637 - 054 K/CU MM    MPV 10.3 7.5 - 11.1 FL    Differential Type AUTOMATED DIFFERENTIAL     Segs Relative 75.0 (H) 36 - 66 %    Lymphocytes % 15.1 (L) 24 - 44 %    Monocytes % 8.7 (H) 0 - 4 %    Eosinophils % 0.3 0 - 3 %    Basophils % 0.1 0 - 1 %    Segs Absolute 5.5 K/CU MM    Lymphocytes Absolute 1.1 K/CU MM    Monocytes Absolute 0.6 K/CU MM    Eosinophils Absolute 0.0 K/CU MM    Basophils Absolute 0.0 K/CU MM    Nucleated RBC % 0.0 %    Total Nucleated RBC 0.0 K/CU MM    Total Immature Neutrophil 0.06 K/CU MM    Immature Neutrophil % 0.8 (H) 0 - 0.43 %   C-Reactive Protein    Collection Time: 09/02/20 12:39 AM   Result Value Ref Range    CRP, High Sensitivity 98.8 mg/L     CULTURE results: Invalid input(s): BLOOD CULTURE,  URINE CULTURE, SURGICAL CULTURE    Diagnosis:  Patient Active Problem List   Diagnosis    Pneumonia due to organism    [facility]acute [facility] failure) [facility] [address]    Essential hypertension, benign    Depressive disorder, not elsewhere classified    Acute renal failure (HCC)    Metabolic encephalopathy    SOB (shortness of breath)    Elevated liver enzymes    Acute respiratory failure (HCC)    Stage III pressure ulcer of sacral region ([facility] [address]    Pneumonia due to COVID-19 virus       Active Problems  Active Problems:    Acute respiratory failure (HCC)    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 *      Impression and plan   Clinical status: improving   Therapeutic:   Diagnostic: Repeat blood culture, MRSA nares.  F/u: blood cx, MRSA nares   Other:   Summary: repeat SARS-CoV-2 test positive on 8/21 and 8/29. COVID-19 test once more repeated and remains positive. CXR no pneumonia.  F/u blood cx.      Electronically signed by: Electronically signed by Kvng Hamilton MD on 9/2/2020 at 1:26 PM

## 2020-09-02 NOTE — PROGRESS NOTES
Hospitalist Progress Note      Name:  Saadia Asencio DOB/Age/Sex: 1932  (80 y.o. male)   MRN & CSN:  [id number] & [id number] Admission Date/Time: 2020  1:17 AM   Location:  -A PCP: Ricardo Borden MD         Hospital Day: 13    Assessment and Plan:   Saadia Asencio is a 80 y.o.  male  who presents with <principal problem not specified>    > Acute hypoxic respiratory failure due to COVID pneumonia - resolved  Was recently treated for community-acquired pneumonia and sent to [facility]  Chest x-ray on admission shows bibasilar atelectasis   ECHO with normal LVEF, Leukocytosis resolved  SARS-COV2 detected on  and  - (persitently positive)   Trend procalcitonin and CRP  Saturating in the 90s on room air since  -  Dexamethasone was discontinued by ID   Repeat covid positive, persistent fever, repeat CXR no acute pathology and blood cx pending.      >Sepsis likely due to sacral decubitus ulcers, unstageable, present on admission  S/p I and D at bedside    Blood cultures negative to date, surgical culture pending   Surgery on board  Wound ostomy -local wound care   IV Ab ( vancomycin and cefepime) Dc per ID   Sepsis was ruled out     >Confirmed COVID -19     SARS-COV2 detected on  - off oxygen and on room air, s/p Dexamethasone  - LFT high for remdesevir  Repeat covid positive.        >Acute kidney injury on CKD 3 -resolved with hydration - DC fluids      >Elevated liver enzymes -Stable   Recent hepatitis panel negative  Recommend follow-up as outpatient with GI     >Elevated d-dimer -VQ scan was done on admission, shows low probability of PE      >Chronic issues   Hypertension   Depression  COPD, not in exacerbation  Moderate PCM   Anemia of chronic disease     Diet DIET CARDIAC; Dietary Nutrition Supplements: Wound Healing Oral Supplement  Dietary Nutrition Supplements: Standard High Calorie Oral Supplement   DVT Prophylaxis ? Lovenox   GI Prophylaxis ?  PPI   Code Status Limited Disposition  back to SNF pending negative covid and fever free     History of Present Illness:     Pt S&E. No chest pain, no dyspnea, no abd pain, no N/V.     10-14 point ROS reviewed negative, unless as noted above    Objective: Intake/Output Summary (Last 24 hours) at 9/2/2020 1309  Last data filed at 9/2/2020 0826  Gross per 24 hour   Intake 10 ml   Output 1250 ml   Net -1240 ml      Vitals:   Vitals:    09/02/20 0826   BP:    Pulse: 88   Resp:    Temp:    SpO2:      Physical Exam:      GEN Awake male, cooperative, no apparent distress. RESP Decreased air sounds . Symmetric chest movement . CARDIO/VASC  Regular rate. GI Abdomen is soft without significant tenderness  MSK No gross joint deformities. SKIN Sacral Decubitus ulcer with clean edges, no thick discharge, no edema  NEURO normal speech,  PSYCH Awake, alert, oriented. Affect appropriate.     Medications:   Medications:    enoxaparin  30 mg Subcutaneous BID    collagenase   Topical BID    ferrous sulfate  325 mg Oral BID WC    sodium chloride flush  10 mL Intravenous 2 times per day    metoprolol succinate  25 mg Oral Daily    mirtazapine  45 mg Oral Nightly    QUEtiapine  100 mg Oral BID    pantoprazole  40 mg Oral BID AC      Infusions:   PRN Meds: traMADol, 50 mg, Q6H PRN  sodium chloride flush, 10 mL, PRN  acetaminophen, 650 mg, Q6H PRN    Or  acetaminophen, 650 mg, Q6H PRN  polyethylene glycol, 17 g, Daily PRN  promethazine, 12.5 mg, Q6H PRN    Or  ondansetron, 4 mg, Q6H PRN          Electronically signed by Aracelis Kenney MD on 9/2/2020 at 1:09 PM

## 2020-09-02 NOTE — PROGRESS NOTES
Occupational Therapy    Occupational Therapy Treatment Note  Name: Sondra Maxwell MRN: [id number] :   1932   Date:  2020   Admission Date: 2020 Room:  -A   Restrictions/Precautions:    Droplet Plus (Covid-19 Positive), Fall Risk  Communication with other providers:  PT, RN    Subjective:  Patient states:  Agreeable to therapy. Pain: Pt reports pain at wound site on sacrum. Objective:    Observation:  Pt was received supine in bed with HOB slightly elevated upon arrival.  Objective Measures:  Vitals stable through session. Treatment, including education:  Therapeutic Activity Training:   Therapeutic activity training was instructed today. Cues were given for safety, sequence, UE/LE placement, awareness, and balance. Activities performed today included bed mobility training, sup-sit, sit-stand x3. Self Care Training:   Cues were given for safety, sequence, UE/LE placement, visual cues, and balance. Activities performed today included toileting and grooming. Pt performed supine to seated bed mobility with MaxAx2 for leg positioning and trunk support. While seated at EOB pt reports increased pain at sacral wound and performed 3 STS from EOB with RW and MaxAx2, pt able to clear bed each trial, however pt demo difficulty maintaining full upright stand and required initiation at hips for upright posture. Pt demo forward flexed posture while in seated and required VC to maintain upright. Pt brushed teeth while seated at EOB with ModA (setup, assist to close toothpaste, assist to accurately vitaly toothpaste onto brush, and VC throughout to ensure task was performed thoroughly). Pt had BM and was returned to supine for linda care with MaxA. Pt required MaxA for rolling in bed and was dependent for linda care this date. It was noted pt had soiled wound bandage, (RN notified) and pt required MaxA for rolling to replace bandage.  When complete, pt required MaxAx2 for scooting upright in bed while supine. Pt requested to shave face and attempted to perform shaving with electric razor while supine with HOB elevated. Pt required setup and VC, however razor did not work on patient's thick beard. Pt was left supine in bed with HOB elevated, all needs met, alarm in place. Assessment / Impression:     Patient's tolerance of treatment:  Good   Adverse Reaction: None  Significant change in status and impact:  None  Barriers to improvement:  Pain at wound site impacting ability to tolerate sitting for prolonged periods of time. Plan for Next Session:    Continue with POC    Time in:  1324  Time out:  1419  Timed treatment minutes:  55  Total treatment time:  55     Electronically signed by: César Roper,   9/2/2020, 2:40 PM    Previously filed values:    Goals:  1. Pt will complete all aspects of bed mobility for EOB/OOB ADLs with ModA. 2. Pt will complete UB/LB bathing with ModA. 3. Pt will complete all aspects of LB dressing with ModA. 4. Pt will complete all functional transfers to and from bed, chair, toilet, shower chair with ModA and RW.   5. Pt will complete all aspects of toileting task with ModA. 6. Pt will complete oral hygiene/grooming routine with CGA. 7. Pt will complete ther ex/ther act with focus on UB strengthening.

## 2020-09-02 NOTE — PROGRESS NOTES
Pt wound cleaned with normal saline. Santyl placed on wound. Wet to dry and abd with paper tape applied by this RN. Pt tolerated dressing change well. Rafa Sinclair assisted with holding Pt.   Pain medication given to Pt 20 minutes after dressing change,

## 2020-09-02 NOTE — PROGRESS NOTES
Physical Therapy    Physical Therapy Treatment Note  Name: Bao Parker MRN: [id number] :   1932   Date:  2020   Admission Date: 2020 Room:  -A   Restrictions/Precautions:        Droplet plus (covid +), falls, crowley, portable tele, pulse ox, BP cuff   Communication with other providers:  RN, OT   Subjective:  Patient states: \"It hurts\" (wound on his buttock/sacrum)   Pain:   Location, Type, Intensity (0/10 to 10/10):  Pain to wound near sacrum but did not quantify. Objective:    Observation:    Supine in bed. Mostly cooperative with therapy but overall poor to fair tolerance and a little resistive at times due to pain on buttock/sacrum area in certain positions. Soiled bandage and bedding with stool. Nurse in to assist with dressing change. Vitals stable on room air. Oriented to person. Was able to correctly choose hospital for place when given hospital and nursing home as an option. Disoriented to time. Treatment, including education/measures:  Rolling: maxAx 1 to the left, maxA x 2 to the right. Resistive when going to the right. Facilitated UE towards bed rail and trunk rotation. Set up assist of LEs in hip and knee flexion. Facilitated movement at hips. MaxA to sustain sidelying. Nursing assisted with dressing change. Supine to sit: maxA x 2 for bilat LE advancement, hip guidance to EOB, and uprighting trunk. Facilitated UE towards bed rail to assist. HOB elevated ~45 deg  Sit to supine: maxA x 2 for bilat LE advancement and guidance of trunk. Dependent x 2 to position neurtral in the bed  Sit to stand: maxA x 2 from EOB 3x to RW. UE set up, blocked bilat feet, fwd weight shift over KALE, and heavy lift assist.   Stand to sit: maxA for controlling sit to EOB   Sitting balance: varied from CGA up to modA x 15+ minutes. Very fwd posture needing constant verbal and tactile cues for uprighting trunk. Poor tolerance to upright sitting with sacral pain.  Performed light dynamic

## 2020-09-03 PROBLEM — M46.28 OSTEOMYELITIS OF SACRUM (HCC): Status: ACTIVE | Noted: 2020-09-03

## 2020-09-03 LAB
ANION GAP SERPL CALCULATED.3IONS-SCNC: 10 MMOL/L (ref 4–16)
BASOPHILS ABSOLUTE: 0 K/CU MM
BASOPHILS RELATIVE PERCENT: 0.1 % (ref 0–1)
BUN BLDV-MCNC: 44 MG/DL (ref 6–23)
CALCIUM SERPL-MCNC: 8.2 MG/DL (ref 8.3–10.6)
CHLORIDE BLD-SCNC: 98 MMOL/L (ref 99–110)
CO2: 27 MMOL/L (ref 21–32)
CREAT SERPL-MCNC: 1.5 MG/DL (ref 0.9–1.3)
DIFFERENTIAL TYPE: ABNORMAL
EOSINOPHILS ABSOLUTE: 0 K/CU MM
EOSINOPHILS RELATIVE PERCENT: 0.4 % (ref 0–3)
GFR AFRICAN AMERICAN: 54 ML/MIN/1.73M2
GFR NON-AFRICAN AMERICAN: 44 ML/MIN/1.73M2
GLUCOSE BLD-MCNC: 97 MG/DL (ref 70–99)
HCT VFR BLD CALC: 27.8 % (ref 42–52)
HEMOGLOBIN: 8.5 GM/DL (ref 13.5–18)
HIGH SENSITIVE C-REACTIVE PROTEIN: 108.7 MG/L
IMMATURE NEUTROPHIL %: 0.5 % (ref 0–0.43)
LYMPHOCYTES ABSOLUTE: 0.9 K/CU MM
LYMPHOCYTES RELATIVE PERCENT: 10.6 % (ref 24–44)
MCH RBC QN AUTO: 26.6 PG (ref 27–31)
MCHC RBC AUTO-ENTMCNC: 30.6 % (ref 32–36)
MCV RBC AUTO: 86.9 FL (ref 78–100)
MONOCYTES ABSOLUTE: 0.7 K/CU MM
MONOCYTES RELATIVE PERCENT: 9.1 % (ref 0–4)
NUCLEATED RBC %: 0 %
PDW BLD-RTO: 15 % (ref 11.7–14.9)
PLATELET # BLD: ABNORMAL K/CU MM (ref 140–440)
PMV BLD AUTO: 11.8 FL (ref 7.5–11.1)
POTASSIUM SERPL-SCNC: 4.7 MMOL/L (ref 3.5–5.1)
PROCALCITONIN: 0.19
PROCALCITONIN: 0.2
RBC # BLD: 3.2 M/CU MM (ref 4.6–6.2)
SEGMENTED NEUTROPHILS ABSOLUTE COUNT: 6.5 K/CU MM
SEGMENTED NEUTROPHILS RELATIVE PERCENT: 79.3 % (ref 36–66)
SODIUM BLD-SCNC: 135 MMOL/L (ref 135–145)
TOTAL IMMATURE NEUTOROPHIL: 0.04 K/CU MM
TOTAL NUCLEATED RBC: 0 K/CU MM
WBC # BLD: 8.1 K/CU MM (ref 4–10.5)

## 2020-09-03 PROCEDURE — 85025 COMPLETE CBC W/AUTO DIFF WBC: CPT

## 2020-09-03 PROCEDURE — 6360000002 HC RX W HCPCS: Performed by: INTERNAL MEDICINE

## 2020-09-03 PROCEDURE — 86141 C-REACTIVE PROTEIN HS: CPT

## 2020-09-03 PROCEDURE — 84145 PROCALCITONIN (PCT): CPT

## 2020-09-03 PROCEDURE — 99232 SBSQ HOSP IP/OBS MODERATE 35: CPT | Performed by: SURGERY

## 2020-09-03 PROCEDURE — 94664 DEMO&/EVAL PT USE INHALER: CPT

## 2020-09-03 PROCEDURE — 6370000000 HC RX 637 (ALT 250 FOR IP): Performed by: INTERNAL MEDICINE

## 2020-09-03 PROCEDURE — 80048 BASIC METABOLIC PNL TOTAL CA: CPT

## 2020-09-03 PROCEDURE — 1200000000 HC SEMI PRIVATE

## 2020-09-03 PROCEDURE — 94150 VITAL CAPACITY TEST: CPT

## 2020-09-03 PROCEDURE — 99211 OFF/OP EST MAY X REQ PHY/QHP: CPT

## 2020-09-03 PROCEDURE — 2580000003 HC RX 258: Performed by: INTERNAL MEDICINE

## 2020-09-03 PROCEDURE — 94761 N-INVAS EAR/PLS OXIMETRY MLT: CPT

## 2020-09-03 PROCEDURE — 99232 SBSQ HOSP IP/OBS MODERATE 35: CPT | Performed by: INTERNAL MEDICINE

## 2020-09-03 RX ADMIN — QUETIAPINE FUMARATE 100 MG: 100 TABLET ORAL at 09:52

## 2020-09-03 RX ADMIN — MIRTAZAPINE 45 MG: 15 TABLET, FILM COATED ORAL at 21:32

## 2020-09-03 RX ADMIN — FERROUS SULFATE TAB 325 MG (65 MG ELEMENTAL FE) 325 MG: 325 (65 FE) TAB at 17:26

## 2020-09-03 RX ADMIN — ENOXAPARIN SODIUM 30 MG: 30 INJECTION SUBCUTANEOUS at 21:32

## 2020-09-03 RX ADMIN — COLLAGENASE SANTYL: 250 OINTMENT TOPICAL at 17:26

## 2020-09-03 RX ADMIN — COLLAGENASE SANTYL: 250 OINTMENT TOPICAL at 08:51

## 2020-09-03 RX ADMIN — ACETAMINOPHEN 650 MG: 325 TABLET ORAL at 17:25

## 2020-09-03 RX ADMIN — COLLAGENASE SANTYL: 250 OINTMENT TOPICAL at 04:55

## 2020-09-03 RX ADMIN — ENOXAPARIN SODIUM 30 MG: 30 INJECTION SUBCUTANEOUS at 09:52

## 2020-09-03 RX ADMIN — SODIUM CHLORIDE, PRESERVATIVE FREE 10 ML: 5 INJECTION INTRAVENOUS at 21:34

## 2020-09-03 RX ADMIN — METOPROLOL SUCCINATE 25 MG: 25 TABLET, EXTENDED RELEASE ORAL at 09:52

## 2020-09-03 RX ADMIN — PANTOPRAZOLE SODIUM 40 MG: 40 TABLET, DELAYED RELEASE ORAL at 09:52

## 2020-09-03 RX ADMIN — QUETIAPINE FUMARATE 100 MG: 100 TABLET ORAL at 21:33

## 2020-09-03 RX ADMIN — PANTOPRAZOLE SODIUM 40 MG: 40 TABLET, DELAYED RELEASE ORAL at 17:26

## 2020-09-03 RX ADMIN — FERROUS SULFATE TAB 325 MG (65 MG ELEMENTAL FE) 325 MG: 325 (65 FE) TAB at 09:52

## 2020-09-03 RX ADMIN — SODIUM CHLORIDE, PRESERVATIVE FREE 10 ML: 5 INJECTION INTRAVENOUS at 09:52

## 2020-09-03 ASSESSMENT — PAIN SCALES - GENERAL
PAINLEVEL_OUTOF10: 2
PAINLEVEL_OUTOF10: 0
PAINLEVEL_OUTOF10: 0

## 2020-09-03 ASSESSMENT — PAIN SCALES - WONG BAKER
WONGBAKER_NUMERICALRESPONSE: 2

## 2020-09-03 ASSESSMENT — PAIN DESCRIPTION - LOCATION: LOCATION: BUTTOCKS;SACRUM

## 2020-09-03 ASSESSMENT — PAIN DESCRIPTION - PAIN TYPE: TYPE: ACUTE PAIN

## 2020-09-03 NOTE — PROGRESS NOTES
plan   Clinical status: stable   Therapeutic:   Diagnostic:    F/u: 9/1-blood cx ngtd  MRSA nares   Other:   Summary: repeat SARS-CoV-2 test positive on 8/21 and 8/29. COVID-19 test once more repeated and remains positive. CXR no pneumonia. F/u blood cx.suspect fecal contamination of the the sacral decubitus. D/w Dr. Chin Fuentes. Surgical re-evaluation for diverting colostomy should be made.  .      Electronically signed by: Electronically signed by Bobby Chu MD on 9/3/2020 at 6:33 PM

## 2020-09-03 NOTE — PROGRESS NOTES
GENERAL SURGERY PROGRESS NOTE    Wellington Granger is a 80 y.o. male s/p sacral ulcer debridement on 8/23. Subjective:  Doing ok this morning. Objective:    Vitals: VITALS:  BP (!) 109/58   Pulse 99   Temp 100.4 °F (38 °C) (Oral)   Resp 20   Ht 5' 7.99\" (1.727 m)   Wt 189 lb (85.7 kg)   SpO2 93%   BMI 28.75 kg/m²     I/O: 09/02 0701 - 09/03 0700  In: 10 [I.V.:10]  Out: 800 [Urine:800]    Labs/Imaging Results:   Lab Results   Component Value Date     09/03/2020    K 4.7 09/03/2020    CL 98 09/03/2020    CO2 27 09/03/2020    BUN 44 09/03/2020    CREATININE 1.5 09/03/2020    GLUCOSE 97 09/03/2020    CALCIUM 8.2 09/03/2020      Lab Results   Component Value Date    WBC 8.1 09/03/2020    HGB 8.5 (L) 09/03/2020    HCT 27.8 (L) 09/03/2020    MCV 86.9 09/03/2020     09/02/2020       Scheduled Meds:   enoxaparin, 30 mg, Subcutaneous, BID    collagenase, , Topical, BID    ferrous sulfate, 325 mg, Oral, BID WC    sodium chloride flush, 10 mL, Intravenous, 2 times per day    metoprolol succinate, 25 mg, Oral, Daily    mirtazapine, 45 mg, Oral, Nightly    QUEtiapine, 100 mg, Oral, BID    pantoprazole, 40 mg, Oral, BID AC    Physical Exam:  General: Alert and awake, interactive, no distress. HEENT: Anicteric sclerae, MMM. Extremities: No edema bilat LE. Buttock: Large sacral ulcer now extending to the level of bone(was previously to tendon) with necrotic edges and small amount of fibrinous tissue along the skin edges, less necrotic tissue associated with his wound today  Abdomen: Soft, nontender, nondistended. Assessment and Plan:  80 y.o. male s/p debridement of sacral ulcer. Covid positive. Wound was changed with Wound Care team today. The wound continues to look  although there is now bone visible at the base. This will likely need debridement in the OR at some point. It would ideally be done once he is clear of Covid.     Active Problems:    Acute respiratory failure (HCC)    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 ([facility] [address]    Pneumonia due to COVID-19 virus    Osteomyelitis of sacrum ([facility] [address]  Resolved Problems:    * No resolved hospital problems.  *        - continue wound cares as ordered by Wound Care team with santyl/NS damp gauze/ABD/skin prep to linda-wound and paper tape BID  - will continue to follow and assess the wound intermittently    Keisha Hassan MD  9/3/2020  10:08 AM

## 2020-09-03 NOTE — CONSULTS
[facility] Continence Nurse  Consult Note       Sylvie Barton  AGE: 80 y.o. GENDER: male  : 1932  TODAY'S DATE:  9/3/2020    Subjective:     Reason for  Evaluation and Assessment: wound reassessment      Sylvie Barton is a 80 y.o. male referred by:   [x] Physician  [] Nursing  [] Other:     Wound Identification:  Wound Type: pressure  Contributing Factors: chronic pressure, decreased mobility, decreased tissue oxygenation, malnutrition and incontinence of stool        PAST MEDICAL HISTORY        Diagnosis Date    Anxiety     Depression     Hypertension     Nerves        PAST SURGICAL HISTORY    Past Surgical History:   Procedure Laterality Date    JOINT REPLACEMENT      right hip    UPPER GASTROINTESTINAL ENDOSCOPY N/A 2020    EGD BIOPSY performed by Lawson Ceron MD at [facility]    Family History   Problem Relation Age of Onset    Cancer Mother     Cancer Father     Cancer Sister        SOCIAL HISTORY    Social History     Tobacco Use    Smoking status: Former Smoker     Last attempt to quit: 1974     Years since quittin.7    Smokeless tobacco: Current User     Types: Chew   Substance Use Topics    Alcohol use: No     Comment: used to drimk on weekends stopped 2 years ago    Drug use: No       ALLERGIES    No Known Allergies    MEDICATIONS    No current facility-administered medications on file prior to encounter. Current Outpatient Medications on File Prior to Encounter   Medication Sig Dispense Refill    pantoprazole (PROTONIX) 40 MG tablet Take 1 tablet by mouth 2 times daily (before meals) 30 tablet 3    furosemide (LASIX) 40 MG tablet Take 1 tablet by mouth daily for 3 days 3 tablet 0    cloNIDine (CATAPRES) 0.1 MG tablet Take 1 tablet by mouth every 6 hours as needed (for B/P systolic > 948). 60 tablet 3    QUEtiapine (SEROQUEL) 100 MG tablet Take 100 mg by mouth 2 times daily.       metoprolol (TOPROL-XL) 25 MG XL tablet Take 25 mg by mouth daily.  mirtazapine (REMERON) 45 MG tablet Take 45 mg by mouth nightly. Objective:      BP (!) 109/58   Pulse 99   Temp 100.4 °F (38 °C) (Oral)   Resp 20   Ht 5' 7.99\" (1.727 m)   Wt 189 lb (85.7 kg)   SpO2 93%   BMI 28.75 kg/m²   Boone Risk Score: Boone Scale Score: 12    LABS    CBC:   Lab Results   Component Value Date    WBC 8.1 09/03/2020    RBC 3.20 09/03/2020    HGB 8.5 09/03/2020    HCT 27.8 09/03/2020    MCV 86.9 09/03/2020    MCH 26.6 09/03/2020    MCHC 30.6 09/03/2020    RDW 15.0 09/03/2020    PLT  09/03/2020     PLATELETS APPEAR NORMAL IN NUMBER, UNABLE TO GIVE PLATELET COUNT DUE TO PLATELET CLUMPING.     MPV 11.8 09/03/2020     CMP:    Lab Results   Component Value Date     09/03/2020    K 4.7 09/03/2020    CL 98 09/03/2020    CO2 27 09/03/2020    BUN 44 09/03/2020    CREATININE 1.5 09/03/2020    GFRAA 54 09/03/2020    LABGLOM 44 09/03/2020    GLUCOSE 97 09/03/2020    PROT 5.4 08/22/2020    PROT 6.4 10/24/2012    LABALBU 2.6 08/22/2020    CALCIUM 8.2 09/03/2020    BILITOT 0.5 08/22/2020    ALKPHOS 241 08/22/2020    AST 88 08/22/2020     08/22/2020     Albumin:    Lab Results   Component Value Date    LABALBU 2.6 08/22/2020     PT/INR:    Lab Results   Component Value Date    PROTIME 13.9 08/04/2020    INR 1.15 08/04/2020     HgBA1c:    Lab Results   Component Value Date    LABA1C 5.3 06/05/2013         Assessment:     Patient Active Problem List   Diagnosis    Pneumonia due to organism    [facility]acute [facility] failure) [facility]    Essential hypertension, benign    Depressive disorder, not elsewhere classified    Acute renal failure (HCC)    Metabolic encephalopathy    SOB (shortness of breath)    Elevated liver enzymes    Acute respiratory failure (HCC)    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 ([facility]    Pneumonia due to COVID-19 virus    Osteomyelitis of sacrum (HCC)       Measurements:  Wound 08/10/20 Sacrum and buttock (Active)   Wound Pressure Stage  4 09/03/20 0849   Dressing Status Clean;Dry; Intact 09/03/20 0945   Dressing Changed Changed/New 09/03/20 0849   Dressing/Treatment Collagen;Moist to dry;ABD;Tape change 09/02/20 2308   Wound Cleansed Rinsed/Irrigated with saline 09/03/20 0849   Dressing Change Due 09/02/20 09/02/20 0823   Wound Length (cm) 11 cm 09/03/20 0849   Wound Width (cm) 9.5 cm 09/03/20 0849   Wound Depth (cm) 3.5 cm 09/03/20 0849   Wound Surface Area (cm^2) 104.5 cm^2 09/03/20 0849   Change in Wound Size % (l*w) -16.11 09/03/20 0849   Wound Volume (cm^3) 365.75 cm^3 09/03/20 0849   Wound Healing % -3964 09/03/20 0849   Distance Tunneling (cm) 0 cm 09/03/20 0849   Tunneling Position ___ O'Clock 0 09/03/20 0849   Undermining Starts ___ O'Clock 0 09/03/20 0849   Undermining Ends___ O'Clock 0 09/03/20 0849   Undermining Maxium Distance (cm) 0 09/03/20 0849   Wound Assessment Brown;Pink;Red;Yellow 09/03/20 0849   Drainage Amount Large 09/03/20 0849   Drainage Description Green;Serosanguinous 09/03/20 0849   Odor None 09/03/20 0849   Margins Defined edges 09/03/20 0849   Exposed structure Bone 09/03/20 0849   Linda-wound Assessment Pink 09/03/20 0849   Non-staged Wound Description Full thickness 09/03/20 0849   Burien%Wound Bed 30 09/03/20 0849   Red%Wound Bed 20 09/03/20 0849   Yellow%Wound Bed 30 09/03/20 0849   Black%Wound Bed 20 09/02/20 0823   Purple%Wound Bed 20 09/02/20 0823   Other%Wound Bed 20 09/03/20 0849   Number of days: 23       Response to treatment:  With complaints of pain. Pain Assessment:  Severity:  mild  Quality of pain: ache  Wound Pain Timing/Severity: intermittent  Premedicated: no    Plan:     Plan of Care: Wound 08/10/20 Sacrum and buttock-Dressing/Treatment: (santyl, NS moist gauze, abd, skin prep to linda wound)  [REMOVED] Wound 08/21/20 Left lateral abdomen-Dressing/Treatment: Open to air     Met Dr. Franca Treadwell in room for wound reassessment. Pt incontinent of stool.  Linda care done and bed pad changed. Sacral wound pictured and measured. Bone exposed. Per Dr. Cyndi French will need further debridement in OR at some point. Treatment as above. Heels pink, blanching, and intact. Left lateral abdomen wound appears healed. Positioned to right side with heels floated with pillow support. Pt is at high risk for skin breakdown AEB Boone. Follow Boone orders. Specialty Bed Required : yes  [x] Low Air Loss   [x] Pressure Redistribution  [] Fluid Immersion  [] Bariatric  [] Total Pressure Relief  [] Other:     Discharge Plan:  Placement for patient upon discharge: tbd  Hospice Care: no  Patient appropriate for Outpatient [address]: yes follow up with Dr. Cyndi French    Patient/Caregiver Teaching:  Level of patient/caregiver understanding able to:   Needs reinforcement.         Electronically signed by Joann Iniguez RN,  on 9/3/2020 at 11:00 AM

## 2020-09-03 NOTE — CARE COORDINATION
Phoned Roman Schmidt at [facility] to let her know that PT/OT treatment note has been completed for precert and had to leave a VM .  Case Management to follow

## 2020-09-04 PROBLEM — E44.0 MODERATE MALNUTRITION (HCC): Status: ACTIVE | Noted: 2020-09-04

## 2020-09-04 LAB
ANION GAP SERPL CALCULATED.3IONS-SCNC: 10 MMOL/L (ref 4–16)
BASOPHILS ABSOLUTE: 0 K/CU MM
BASOPHILS RELATIVE PERCENT: 0.1 % (ref 0–1)
BUN BLDV-MCNC: 45 MG/DL (ref 6–23)
CALCIUM SERPL-MCNC: 8.3 MG/DL (ref 8.3–10.6)
CHLORIDE BLD-SCNC: 98 MMOL/L (ref 99–110)
CO2: 27 MMOL/L (ref 21–32)
CREAT SERPL-MCNC: 1.4 MG/DL (ref 0.9–1.3)
DIFFERENTIAL TYPE: ABNORMAL
EOSINOPHILS ABSOLUTE: 0 K/CU MM
EOSINOPHILS RELATIVE PERCENT: 0.1 % (ref 0–3)
GFR AFRICAN AMERICAN: 58 ML/MIN/1.73M2
GFR NON-AFRICAN AMERICAN: 48 ML/MIN/1.73M2
GLUCOSE BLD-MCNC: 102 MG/DL (ref 70–99)
HCT VFR BLD CALC: 28 % (ref 42–52)
HEMOGLOBIN: 8.5 GM/DL (ref 13.5–18)
HIGH SENSITIVE C-REACTIVE PROTEIN: 120.6 MG/L
IMMATURE NEUTROPHIL %: 0.4 % (ref 0–0.43)
LYMPHOCYTES ABSOLUTE: 0.7 K/CU MM
LYMPHOCYTES RELATIVE PERCENT: 9.7 % (ref 24–44)
MCH RBC QN AUTO: 26.3 PG (ref 27–31)
MCHC RBC AUTO-ENTMCNC: 30.4 % (ref 32–36)
MCV RBC AUTO: 86.7 FL (ref 78–100)
MONOCYTES ABSOLUTE: 0.6 K/CU MM
MONOCYTES RELATIVE PERCENT: 8 % (ref 0–4)
NUCLEATED RBC %: 0 %
PDW BLD-RTO: 14.9 % (ref 11.7–14.9)
PLATELET # BLD: 192 K/CU MM (ref 140–440)
PMV BLD AUTO: 9.7 FL (ref 7.5–11.1)
POTASSIUM SERPL-SCNC: 4.8 MMOL/L (ref 3.5–5.1)
PROCALCITONIN: 0.19
RBC # BLD: 3.23 M/CU MM (ref 4.6–6.2)
SEGMENTED NEUTROPHILS ABSOLUTE COUNT: 6 K/CU MM
SEGMENTED NEUTROPHILS RELATIVE PERCENT: 81.7 % (ref 36–66)
SODIUM BLD-SCNC: 135 MMOL/L (ref 135–145)
TOTAL IMMATURE NEUTOROPHIL: 0.03 K/CU MM
TOTAL NUCLEATED RBC: 0 K/CU MM
WBC # BLD: 7.3 K/CU MM (ref 4–10.5)

## 2020-09-04 PROCEDURE — 94150 VITAL CAPACITY TEST: CPT

## 2020-09-04 PROCEDURE — 94761 N-INVAS EAR/PLS OXIMETRY MLT: CPT

## 2020-09-04 PROCEDURE — 6370000000 HC RX 637 (ALT 250 FOR IP): Performed by: INTERNAL MEDICINE

## 2020-09-04 PROCEDURE — 6360000002 HC RX W HCPCS: Performed by: INTERNAL MEDICINE

## 2020-09-04 PROCEDURE — 1200000000 HC SEMI PRIVATE

## 2020-09-04 PROCEDURE — 85025 COMPLETE CBC W/AUTO DIFF WBC: CPT

## 2020-09-04 PROCEDURE — 2580000003 HC RX 258: Performed by: INTERNAL MEDICINE

## 2020-09-04 PROCEDURE — 86141 C-REACTIVE PROTEIN HS: CPT

## 2020-09-04 PROCEDURE — 84145 PROCALCITONIN (PCT): CPT

## 2020-09-04 PROCEDURE — 80048 BASIC METABOLIC PNL TOTAL CA: CPT

## 2020-09-04 RX ADMIN — SODIUM CHLORIDE, PRESERVATIVE FREE 10 ML: 5 INJECTION INTRAVENOUS at 09:36

## 2020-09-04 RX ADMIN — PANTOPRAZOLE SODIUM 40 MG: 40 TABLET, DELAYED RELEASE ORAL at 16:39

## 2020-09-04 RX ADMIN — QUETIAPINE FUMARATE 100 MG: 100 TABLET ORAL at 20:28

## 2020-09-04 RX ADMIN — FERROUS SULFATE TAB 325 MG (65 MG ELEMENTAL FE) 325 MG: 325 (65 FE) TAB at 16:39

## 2020-09-04 RX ADMIN — SODIUM CHLORIDE, PRESERVATIVE FREE 10 ML: 5 INJECTION INTRAVENOUS at 20:29

## 2020-09-04 RX ADMIN — FERROUS SULFATE TAB 325 MG (65 MG ELEMENTAL FE) 325 MG: 325 (65 FE) TAB at 09:35

## 2020-09-04 RX ADMIN — COLLAGENASE SANTYL: 250 OINTMENT TOPICAL at 16:39

## 2020-09-04 RX ADMIN — TRAMADOL HYDROCHLORIDE 50 MG: 50 TABLET, FILM COATED ORAL at 00:14

## 2020-09-04 RX ADMIN — COLLAGENASE SANTYL: 250 OINTMENT TOPICAL at 05:47

## 2020-09-04 RX ADMIN — QUETIAPINE FUMARATE 100 MG: 100 TABLET ORAL at 09:35

## 2020-09-04 RX ADMIN — ENOXAPARIN SODIUM 30 MG: 30 INJECTION SUBCUTANEOUS at 09:35

## 2020-09-04 RX ADMIN — ENOXAPARIN SODIUM 30 MG: 30 INJECTION SUBCUTANEOUS at 20:28

## 2020-09-04 RX ADMIN — PANTOPRAZOLE SODIUM 40 MG: 40 TABLET, DELAYED RELEASE ORAL at 05:47

## 2020-09-04 RX ADMIN — METOPROLOL SUCCINATE 25 MG: 25 TABLET, EXTENDED RELEASE ORAL at 09:35

## 2020-09-04 RX ADMIN — MIRTAZAPINE 45 MG: 15 TABLET, FILM COATED ORAL at 20:28

## 2020-09-04 RX ADMIN — TRAMADOL HYDROCHLORIDE 50 MG: 50 TABLET, FILM COATED ORAL at 17:13

## 2020-09-04 ASSESSMENT — PAIN SCALES - PAIN ASSESSMENT IN ADVANCED DEMENTIA (PAINAD)
TOTALSCORE: 3
NEGVOCALIZATION: 1
CONSOLABILITY: 1
FACIALEXPRESSION: 0
BREATHING: 0
BODYLANGUAGE: 1

## 2020-09-04 ASSESSMENT — PAIN SCALES - GENERAL
PAINLEVEL_OUTOF10: 7
PAINLEVEL_OUTOF10: 8
PAINLEVEL_OUTOF10: 3
PAINLEVEL_OUTOF10: 5
PAINLEVEL_OUTOF10: 3

## 2020-09-04 ASSESSMENT — PAIN DESCRIPTION - LOCATION: LOCATION: BUTTOCKS;SACRUM

## 2020-09-04 ASSESSMENT — PAIN SCALES - WONG BAKER
WONGBAKER_NUMERICALRESPONSE: 2
WONGBAKER_NUMERICALRESPONSE: 2;4

## 2020-09-04 ASSESSMENT — PAIN DESCRIPTION - ONSET: ONSET: ON-GOING

## 2020-09-04 ASSESSMENT — PAIN DESCRIPTION - DESCRIPTORS: DESCRIPTORS: DISCOMFORT;CONSTANT

## 2020-09-04 ASSESSMENT — PAIN - FUNCTIONAL ASSESSMENT: PAIN_FUNCTIONAL_ASSESSMENT: PREVENTS OR INTERFERES SOME ACTIVE ACTIVITIES AND ADLS

## 2020-09-04 ASSESSMENT — PAIN DESCRIPTION - PAIN TYPE: TYPE: ACUTE PAIN

## 2020-09-04 ASSESSMENT — PAIN DESCRIPTION - PROGRESSION: CLINICAL_PROGRESSION: GRADUALLY WORSENING

## 2020-09-04 ASSESSMENT — PAIN DESCRIPTION - FREQUENCY: FREQUENCY: INTERMITTENT

## 2020-09-04 NOTE — CARE COORDINATION
Received return call from JAMIE at [place].  She stated that updated criteria for patient to be able to return to [place] is:    Patient does not need a negative COVID test to return    Patient will need to be symptom free for 10 days past first positive COVID test    Patient will need to be symptom free for 20 days past first positive COVID test if immunosuppressed and on steroids    Patient cannot be on any fever reducing medications 72 hours prior to admission to SNF

## 2020-09-04 NOTE — PROGRESS NOTES
GENERAL SURGERY PROGRESS NOTE    Evelin Elizabeth is a 80 y.o. male s/p sacral ulcer debridement on 8/23. I spoke with Ari's daughter Peter Thapa this morning and discussed his clinical situation at length. I suggested that diverting colostomy may help avoid contamination of his wound and may actually be easier to care for at a nursing home rather than having to roll him and clean any stool frequently. We also discussed the need for possible further debridement of the sacral wound in the future although the wound is cleaning up well with current dressing changes. Peter Thapa is adamant that Julio Gutierrez would never want a colostomy. If further operative debridement is needed she would be more open to this. For now the wound is becoming  with local wound cares.      - no plans for further operation currently  - continue wound cares as ordered by Wound Care team with santyl/NS damp gauze/ABD/skin prep to linda-wound and paper tape BID  - will continue to follow and assess the wound intermittently    Kennedy Arriaga MD  9/4/2020  9:58 AM

## 2020-09-04 NOTE — PROGRESS NOTES
Hospitalist Progress Note      Name:  Ilana Benitez /Age/Sex: 1932  (80 y.o. male)   MRN & CSN:  [id number] & [id number] Admission Date/Time: 2020  1:17 AM   Location:  -A PCP: Chin Connolly MD         Hospital Day: 15    Assessment and Plan:   Ilana Benitez is a 80 y.o.  male  who presents with <principal problem not specified>    > Acute hypoxic respiratory failure due to COVID pneumonia - resolved  Was recently treated for community-acquired pneumonia and sent to [facility]  Chest x-ray on admission shows bibasilar atelectasis   ECHO with normal LVEF, Leukocytosis resolved  SARS-COV2 detected on  and  - (persitently positive)   Trend procalcitonin and CRP  Saturating in the 90s on room air since  -  Dexamethasone was discontinued by ID   Repeat covid positive, recurrent mild fever, repeat CXR no acute pathology and blood cx NTD.   - discussed with ID and surgery. Due to pt's age and condition no aggressive intervention per family .      >Sepsis likely due to sacral decubitus ulcers, unstageable, present on admission  S/p I and D at bedside    Blood cultures negative to date, surgical culture pending   Surgery on board  Wound ostomy -local wound care   IV Ab ( vancomycin and cefepime) Dc per ID   Sepsis was ruled out  - discussed with ID and surgery, - discussed with ID and surgery.  Due to pt's age and condition no aggressive intervention per family .      >Confirmed COVID -23   active infection resolved  SARS-COV2 detected on  - off oxygen and on room air, s/p Dexamethasone  - LFT high for remdesevir  Repeat covid positive.        >Acute kidney injury on CKD 3 -resolved with hydration - DC fluids      >Elevated liver enzymes -Stable   Recent hepatitis panel negative  Recommend follow-up as outpatient with GI     >Elevated d-dimer -VQ scan was done on admission, shows low probability of PE      >Chronic issues   Hypertension   Depression  COPD, not in exacerbation  Moderate PCM   Anemia of chronic disease     Diet DIET CARDIAC; Dietary Nutrition Supplements: Standard High Calorie Oral Supplement   DVT Prophylaxis ? Lovenox   GI Prophylaxis ? PPI   Code Status Limited   Disposition  back to SNF pending precert     History of Present Illness:     Pt S&E. No chest pain, no dyspnea, admit arthritis chronic pain, no abd pain, no N/V,     10-14 point ROS reviewed negative, unless as noted above    Objective: Intake/Output Summary (Last 24 hours) at 9/4/2020 1332  Last data filed at 9/3/2020 2134  Gross per 24 hour   Intake 750 ml   Output 900 ml   Net -150 ml      Vitals:   Vitals:    09/04/20 0930   BP: 124/79   Pulse: 104   Resp: 19   Temp: 100.1 °F (37.8 °C)   SpO2: 98%     Physical Exam:      GEN Awake male, cooperative, no apparent distress. RESP Decreased air sounds . Symmetric chest movement . CARDIO/VASC  Regular rate. GI Abdomen is soft without significant tenderness  MSK No gross joint deformities. SKIN Sacral Decubitus ulcer with clean edges, no thick discharge, no edema  NEURO normal speech,  PSYCH Awake, alert, oriented. Affect appropriate.     Medications:   Medications:    enoxaparin  30 mg Subcutaneous BID    collagenase   Topical BID    ferrous sulfate  325 mg Oral BID WC    sodium chloride flush  10 mL Intravenous 2 times per day    metoprolol succinate  25 mg Oral Daily    mirtazapine  45 mg Oral Nightly    QUEtiapine  100 mg Oral BID    pantoprazole  40 mg Oral BID AC      Infusions:   PRN Meds: traMADol, 50 mg, Q6H PRN  sodium chloride flush, 10 mL, PRN  acetaminophen, 650 mg, Q6H PRN    Or  acetaminophen, 650 mg, Q6H PRN  polyethylene glycol, 17 g, Daily PRN  promethazine, 12.5 mg, Q6H PRN    Or  ondansetron, 4 mg, Q6H PRN          Electronically signed by Millie Holden MD on 9/4/2020 at 1:32 PM

## 2020-09-04 NOTE — CARE COORDINATION
Phoned Dev Chavez at [facility] to check on status of referral to their sister facility SNF in [place]. She stated that now that facility no longer has a COVID unit and therefore patient cannot go there and will need to return to [facility] . She inquired on if patient is still having COVID symptoms. CM spoke with patient 's nurse today Ellie Galarza. She stated that patient has a temp today, cough and is on oxygen. DILLON let Dev Chavez know and she voiced understanding. She stated that she will follow patient and will get clarification on what criteria patient will need to meed to readmit to [facility].

## 2020-09-04 NOTE — PROGRESS NOTES
Comprehensive Nutrition Assessment    Type and Reason for Visit:  Reassess    Nutrition Recommendations/Plan:   Add ONS TID  Encourage po intake as able  Will monitor po intake, nutrition status, poc    Nutrition Assessment:  pt remains on covid unit, on room air, on cardiac diet with varied po intake ranging from 25-75% based on documentation, +BM 9/3, pt remains high nutrition risk at this time    Malnutrition Assessment:  Malnutrition Status: Moderate malnutrition    Context:  Acute Illness   (unable to preform NFPE at this time d/t covid-19 infection)  Findings of the 6 clinical characteristics of malnutrition:  Energy Intake:  1 - 75% or less of estimated energy requirements for 7 or more days  Weight Loss:  7 - Greater than 2% over 1 week       Estimated Daily Nutrient Needs:  Energy (kcal):  9639-4934; Weight Used for Energy Requirements:  Current     Protein (g):  105; Weight Used for Protein Requirements:  Ideal        Fluid (ml/day):  4555-2200; Weight Used for Fluid Requirements:  Current      Nutrition Related Findings:  Labs :BUN 45, Cr 1.4      Wounds:  Stage IV, Pressure Injury       Current Nutrition Therapies:    DIET CARDIAC; Anthropometric Measures:  · Height: 5' 7.99\" (172.7 cm)  · Current Body Weight: 181 lb (82.1 kg)   · Admission Body Weight: 197 lb 8.5 oz (89.6 kg)    · Usual Body Weight: 196 lb (88.9 kg)     · Ideal Body Weight: 154 lbs; % Ideal Body Weight 117.5 %   · BMI: 27.5  · BMI Categories: Overweight (BMI 25.0-29. 9)       Nutrition Diagnosis:   · Inadequate energy intake related to acute injury/trauma as evidenced by intake 0-25%, wounds    Nutrition Interventions:   Food and/or Nutrient Delivery:  Continue Oral Nutrition Supplement, Continue Current Diet  Nutrition Education/Counseling:  No recommendation at this time   Coordination of Nutrition Care:  Continued Inpatient Monitoring    Goals:  pt will consume greater than 75% of emals and supplements       Nutrition Monitoring and Evaluation:   Food/Nutrient Intake Outcomes:  Supplement Intake, Food and Nutrient Intake  Physical Signs/Symptoms Outcomes:  Biochemical Data, Skin, Weight, GI Status, Nutrition Focused Physical Findings     Discharge Planning:     Too soon to determine     Electronically signed by Tim Holloway MS, RD, LD on 9/4/20 at 1:20 PM EDT    Contact: [phone]

## 2020-09-05 LAB
ANION GAP SERPL CALCULATED.3IONS-SCNC: 11 MMOL/L (ref 4–16)
BASOPHILS ABSOLUTE: 0 K/CU MM
BASOPHILS RELATIVE PERCENT: 0.1 % (ref 0–1)
BUN BLDV-MCNC: 33 MG/DL (ref 6–23)
CALCIUM SERPL-MCNC: 8.4 MG/DL (ref 8.3–10.6)
CHLORIDE BLD-SCNC: 100 MMOL/L (ref 99–110)
CO2: 27 MMOL/L (ref 21–32)
CREAT SERPL-MCNC: 1.4 MG/DL (ref 0.9–1.3)
DIFFERENTIAL TYPE: ABNORMAL
EOSINOPHILS ABSOLUTE: 0.1 K/CU MM
EOSINOPHILS RELATIVE PERCENT: 0.6 % (ref 0–3)
GFR AFRICAN AMERICAN: 58 ML/MIN/1.73M2
GFR NON-AFRICAN AMERICAN: 48 ML/MIN/1.73M2
GLUCOSE BLD-MCNC: 92 MG/DL (ref 70–99)
HCT VFR BLD CALC: 32 % (ref 42–52)
HEMOGLOBIN: 9.7 GM/DL (ref 13.5–18)
IMMATURE NEUTROPHIL %: 0.4 % (ref 0–0.43)
LYMPHOCYTES ABSOLUTE: 0.8 K/CU MM
LYMPHOCYTES RELATIVE PERCENT: 9.6 % (ref 24–44)
MCH RBC QN AUTO: 26.6 PG (ref 27–31)
MCHC RBC AUTO-ENTMCNC: 30.3 % (ref 32–36)
MCV RBC AUTO: 87.7 FL (ref 78–100)
MONOCYTES ABSOLUTE: 0.7 K/CU MM
MONOCYTES RELATIVE PERCENT: 8.5 % (ref 0–4)
NUCLEATED RBC %: 0 %
PDW BLD-RTO: 14.7 % (ref 11.7–14.9)
PLATELET # BLD: 142 K/CU MM (ref 140–440)
PMV BLD AUTO: 10.3 FL (ref 7.5–11.1)
POTASSIUM SERPL-SCNC: 4.8 MMOL/L (ref 3.5–5.1)
PROCALCITONIN: 0.19
RBC # BLD: 3.65 M/CU MM (ref 4.6–6.2)
REASON FOR REJECTION: NORMAL
REJECTED TEST: NORMAL
SEGMENTED NEUTROPHILS ABSOLUTE COUNT: 6.5 K/CU MM
SEGMENTED NEUTROPHILS RELATIVE PERCENT: 80.8 % (ref 36–66)
SODIUM BLD-SCNC: 138 MMOL/L (ref 135–145)
TOTAL IMMATURE NEUTOROPHIL: 0.03 K/CU MM
TOTAL NUCLEATED RBC: 0 K/CU MM
WBC # BLD: 8 K/CU MM (ref 4–10.5)

## 2020-09-05 PROCEDURE — 36415 COLL VENOUS BLD VENIPUNCTURE: CPT

## 2020-09-05 PROCEDURE — 85025 COMPLETE CBC W/AUTO DIFF WBC: CPT

## 2020-09-05 PROCEDURE — 36410 VNPNXR 3YR/> PHY/QHP DX/THER: CPT

## 2020-09-05 PROCEDURE — 6360000002 HC RX W HCPCS: Performed by: INTERNAL MEDICINE

## 2020-09-05 PROCEDURE — 80048 BASIC METABOLIC PNL TOTAL CA: CPT

## 2020-09-05 PROCEDURE — 94150 VITAL CAPACITY TEST: CPT

## 2020-09-05 PROCEDURE — 6370000000 HC RX 637 (ALT 250 FOR IP): Performed by: INTERNAL MEDICINE

## 2020-09-05 PROCEDURE — 94761 N-INVAS EAR/PLS OXIMETRY MLT: CPT

## 2020-09-05 PROCEDURE — 02HV33Z INSERTION OF INFUSION DEVICE INTO SUPERIOR VENA CAVA, PERCUTANEOUS APPROACH: ICD-10-PCS | Performed by: INTERNAL MEDICINE

## 2020-09-05 PROCEDURE — 84145 PROCALCITONIN (PCT): CPT

## 2020-09-05 PROCEDURE — 2580000003 HC RX 258: Performed by: PHYSICIAN ASSISTANT

## 2020-09-05 PROCEDURE — 76937 US GUIDE VASCULAR ACCESS: CPT

## 2020-09-05 PROCEDURE — 1200000000 HC SEMI PRIVATE

## 2020-09-05 PROCEDURE — 2580000003 HC RX 258: Performed by: INTERNAL MEDICINE

## 2020-09-05 PROCEDURE — C1751 CATH, INF, PER/CENT/MIDLINE: HCPCS

## 2020-09-05 RX ORDER — SODIUM CHLORIDE 0.9 % (FLUSH) 0.9 %
10 SYRINGE (ML) INJECTION PRN
Status: DISCONTINUED | OUTPATIENT
Start: 2020-09-05 | End: 2020-09-19 | Stop reason: HOSPADM

## 2020-09-05 RX ORDER — SODIUM CHLORIDE 0.9 % (FLUSH) 0.9 %
10 SYRINGE (ML) INJECTION EVERY 12 HOURS SCHEDULED
Status: DISCONTINUED | OUTPATIENT
Start: 2020-09-05 | End: 2020-09-19 | Stop reason: HOSPADM

## 2020-09-05 RX ORDER — LIDOCAINE HYDROCHLORIDE 10 MG/ML
5 INJECTION, SOLUTION EPIDURAL; INFILTRATION; INTRACAUDAL; PERINEURAL ONCE
Status: DISCONTINUED | OUTPATIENT
Start: 2020-09-05 | End: 2020-09-19 | Stop reason: HOSPADM

## 2020-09-05 RX ADMIN — SODIUM CHLORIDE, PRESERVATIVE FREE 10 ML: 5 INJECTION INTRAVENOUS at 09:50

## 2020-09-05 RX ADMIN — QUETIAPINE FUMARATE 100 MG: 100 TABLET ORAL at 20:10

## 2020-09-05 RX ADMIN — ENOXAPARIN SODIUM 30 MG: 30 INJECTION SUBCUTANEOUS at 09:49

## 2020-09-05 RX ADMIN — FERROUS SULFATE TAB 325 MG (65 MG ELEMENTAL FE) 325 MG: 325 (65 FE) TAB at 09:48

## 2020-09-05 RX ADMIN — TRAMADOL HYDROCHLORIDE 50 MG: 50 TABLET, FILM COATED ORAL at 09:48

## 2020-09-05 RX ADMIN — MIRTAZAPINE 45 MG: 15 TABLET, FILM COATED ORAL at 20:10

## 2020-09-05 RX ADMIN — QUETIAPINE FUMARATE 100 MG: 100 TABLET ORAL at 09:48

## 2020-09-05 RX ADMIN — SODIUM CHLORIDE, PRESERVATIVE FREE 10 ML: 5 INJECTION INTRAVENOUS at 09:49

## 2020-09-05 RX ADMIN — PANTOPRAZOLE SODIUM 40 MG: 40 TABLET, DELAYED RELEASE ORAL at 17:35

## 2020-09-05 RX ADMIN — SODIUM CHLORIDE, PRESERVATIVE FREE 10 ML: 5 INJECTION INTRAVENOUS at 20:12

## 2020-09-05 RX ADMIN — FERROUS SULFATE TAB 325 MG (65 MG ELEMENTAL FE) 325 MG: 325 (65 FE) TAB at 17:35

## 2020-09-05 RX ADMIN — COLLAGENASE SANTYL: 250 OINTMENT TOPICAL at 03:46

## 2020-09-05 RX ADMIN — COLLAGENASE SANTYL: 250 OINTMENT TOPICAL at 17:35

## 2020-09-05 RX ADMIN — METOPROLOL SUCCINATE 25 MG: 25 TABLET, EXTENDED RELEASE ORAL at 09:48

## 2020-09-05 RX ADMIN — PANTOPRAZOLE SODIUM 40 MG: 40 TABLET, DELAYED RELEASE ORAL at 06:14

## 2020-09-05 RX ADMIN — ENOXAPARIN SODIUM 30 MG: 30 INJECTION SUBCUTANEOUS at 20:10

## 2020-09-05 ASSESSMENT — PAIN SCALES - GENERAL
PAINLEVEL_OUTOF10: 3
PAINLEVEL_OUTOF10: 4
PAINLEVEL_OUTOF10: 4

## 2020-09-05 NOTE — CONSULTS
Consult completed. POC discussed with ICU Charge RN & Dr. Meera Rodriguez, who report pt does not require PICC, but request MidLine access for lab specimen collection - Provider note reports Hx of CKD3, so MidLine access in upper arms is contraindicated without Nephrologist OK, but Extended Dwell PIV will meet pt's therapeutic needs and return blood for labs. Procedure/rationale explained to pt including benefits vs potential risks/complications; questions answered & consent obtained. #20ga [employer] Endurance Extended Dwell PIV initiated to LUE anterior medial forearm using sterile, UltraSound-guided technique without complications. Positioning verified via UltraSound visualization of catheter within lumen of vessel - site returns blood briskly and flushes without resistance or other abnormalities. Sterile dressing with SkinPrep, BioPatch, SwabCap, and Limb Precautions band applied. Pt tolerated well & denies other c/o or needs. RN notified.

## 2020-09-05 NOTE — PLAN OF CARE
Problem: Skin Integrity:  Goal: Will show no infection signs and symptoms  Description: Will show no infection signs and symptoms  Outcome: Ongoing  Goal: Absence of new skin breakdown  Description: Absence of new skin breakdown  Outcome: Ongoing     Problem: Airway Clearance - Ineffective  Goal: Achieve or maintain patent airway  Outcome: Ongoing     Problem: Gas Exchange - Impaired  Goal: Absence of hypoxia  Outcome: Ongoing  Goal: Promote optimal lung function  Outcome: Ongoing     Problem: Breathing Pattern - Ineffective  Goal: Ability to achieve and maintain a regular respiratory rate  Outcome: Ongoing     Problem:  Body Temperature -  Risk of, Imbalanced  Goal: Ability to maintain a body temperature within defined limits  Outcome: Ongoing  Goal: Will regain or maintain usual level of consciousness  Outcome: Ongoing  Goal: Complications related to the disease process, condition or treatment will be avoided or minimized  Outcome: Ongoing     Problem: Isolation Precautions - Risk of Spread of Infection  Goal: Prevent transmission of infection  Outcome: Ongoing     Problem: Nutrition Deficits  Goal: Optimize nutrtional status  Outcome: Ongoing     Problem: Risk for Fluid Volume Deficit  Goal: Maintain normal heart rhythm  Outcome: Ongoing  Goal: Maintain absence of muscle cramping  Outcome: Ongoing  Goal: Maintain normal serum potassium, sodium, calcium, phosphorus, and pH  Outcome: Ongoing     Problem: Loneliness or Risk for Loneliness  Goal: Demonstrate positive use of time alone when socialization is not possible  Outcome: Ongoing     Problem: Fatigue  Goal: Verbalize increase energy and improved vitality  Outcome: Ongoing     Problem: Patient Education: Go to Patient Education Activity  Goal: Patient/Family Education  Outcome: Ongoing

## 2020-09-05 NOTE — PROGRESS NOTES
Hospitalist Progress Note      Name:  Kala Duke /Age/Sex: 1932  (80 y.o. male)   MRN & CSN:  [id number] & [id number] Admission Date/Time: 2020  1:17 AM   Location:  -A PCP: Maria M Crockett MD         Hospital Day: 16    Assessment and Plan:   Kala Duke is a 80 y.o.  male  who presents with <principal problem not specified>    > Acute hypoxic respiratory failure due to COVID pneumonia - resolved  Was recently treated for community-acquired pneumonia and sent to [facility]  Chest x-ray on admission shows bibasilar atelectasis   ECHO with normal LVEF, Leukocytosis resolved  SARS-COV2 detected on  and  - (persitently positive)   Trend procalcitonin and CRP  Saturating in the 90s on room air since  -  Dexamethasone was discontinued by ID   Repeat covid positive, recurrent mild fever, repeat CXR no acute pathology and blood cx NTD.   - discussed with ID and surgery. Due to pt's age and condition no aggressive intervention per family . - concern about aspiration pt ( pt clears throat and cough frequently ) , will consult SLP     >Sepsis likely due to sacral decubitus ulcers, unstageable, present on admission  S/p I and D at bedside    Blood cultures negative to date, surgical culture pending   Surgery on board  Wound ostomy -local wound care   IV Ab ( vancomycin and cefepime) Dc per ID   Sepsis was ruled out  - discussed with ID and surgery, - discussed with ID and surgery.  Due to pt's age and condition no aggressive intervention per family .      >Confirmed COVID -23   active infection resolved  SARS-COV2 detected on  - off oxygen and on room air, s/p Dexamethasone  - LFT high for remdesevir  Repeat covid positive.        >Acute kidney injury on CKD 3 -resolved with hydration - DC fluids      >Elevated liver enzymes -Stable   Recent hepatitis panel negative  Recommend follow-up as outpatient with GI     >Elevated d-dimer -VQ scan was done on admission, shows low probability of PE      >Chronic issues   Hypertension   Depression  COPD, not in exacerbation  Moderate PCM   Anemia of chronic disease     Diet DIET CARDIAC; Dietary Nutrition Supplements: Standard High Calorie Oral Supplement   DVT Prophylaxis ? Lovenox   GI Prophylaxis ? PPI   Code Status Limited   Disposition  back to SNF pending precert     History of Present Illness:     Pt S&E. Denies any chest pain, no dyspnea, denies difficulty swallowing or chocking with food, noticed multiple times when I see him  He coughs and clear his throat with the food tray in front of him. 10-14 point ROS reviewed negative, unless as noted above    Objective: Intake/Output Summary (Last 24 hours) at 9/5/2020 1020  Last data filed at 9/5/2020 0940  Gross per 24 hour   Intake 230 ml   Output 1100 ml   Net -870 ml      Vitals:   Vitals:    09/05/20 0940   BP: (!) 105/47   Pulse: 93   Resp: 23   Temp: 99.6 °F (37.6 °C)   SpO2: 94%     Physical Exam:      GEN Awake male, cooperative, no apparent distress. RESP Decreased air sounds . Symmetric chest movement . CARDIO/VASC  Regular rate. GI Abdomen is soft without significant tenderness  MSK No gross joint deformities. SKIN Sacral Decubitus ulcer with clean edges, no thick discharge, no edema  NEURO normal speech,  PSYCH Awake, alert, oriented. Affect appropriate.     Medications:   Medications:    lidocaine PF  5 mL Intradermal Once    sodium chloride flush  10 mL Intravenous 2 times per day    enoxaparin  30 mg Subcutaneous BID    collagenase   Topical BID    ferrous sulfate  325 mg Oral BID WC    sodium chloride flush  10 mL Intravenous 2 times per day    metoprolol succinate  25 mg Oral Daily    mirtazapine  45 mg Oral Nightly    QUEtiapine  100 mg Oral BID    pantoprazole  40 mg Oral BID AC      Infusions:   PRN Meds: sodium chloride flush, 10 mL, PRN  traMADol, 50 mg, Q6H PRN  sodium chloride flush, 10 mL, PRN  acetaminophen, 650 mg, Q6H PRN Or  acetaminophen, 650 mg, Q6H PRN  polyethylene glycol, 17 g, Daily PRN  promethazine, 12.5 mg, Q6H PRN    Or  ondansetron, 4 mg, Q6H PRN          Electronically signed by Henry Reddy MD on 9/5/2020 at 10:20 AM

## 2020-09-06 LAB
ANION GAP SERPL CALCULATED.3IONS-SCNC: 12 MMOL/L (ref 4–16)
BASOPHILS ABSOLUTE: 0 K/CU MM
BASOPHILS RELATIVE PERCENT: 0.1 % (ref 0–1)
BUN BLDV-MCNC: 36 MG/DL (ref 6–23)
CALCIUM SERPL-MCNC: 8.2 MG/DL (ref 8.3–10.6)
CHLORIDE BLD-SCNC: 100 MMOL/L (ref 99–110)
CO2: 26 MMOL/L (ref 21–32)
CREAT SERPL-MCNC: 1.4 MG/DL (ref 0.9–1.3)
CULTURE: NORMAL
CULTURE: NORMAL
DIFFERENTIAL TYPE: ABNORMAL
EOSINOPHILS ABSOLUTE: 0.1 K/CU MM
EOSINOPHILS RELATIVE PERCENT: 0.7 % (ref 0–3)
FIBRINOGEN LEVEL: 501 MG/DL (ref 196.9–442.1)
GFR AFRICAN AMERICAN: 58 ML/MIN/1.73M2
GFR NON-AFRICAN AMERICAN: 48 ML/MIN/1.73M2
GLUCOSE BLD-MCNC: 99 MG/DL (ref 70–99)
HCT VFR BLD CALC: 27.8 % (ref 42–52)
HEMOGLOBIN: 8.6 GM/DL (ref 13.5–18)
IMMATURE NEUTROPHIL %: 0.7 % (ref 0–0.43)
LACTATE DEHYDROGENASE: 258 IU/L (ref 120–246)
LYMPHOCYTES ABSOLUTE: 1.2 K/CU MM
LYMPHOCYTES RELATIVE PERCENT: 15.1 % (ref 24–44)
Lab: NORMAL
Lab: NORMAL
MCH RBC QN AUTO: 26.7 PG (ref 27–31)
MCHC RBC AUTO-ENTMCNC: 30.9 % (ref 32–36)
MCV RBC AUTO: 86.3 FL (ref 78–100)
MONOCYTES ABSOLUTE: 0.7 K/CU MM
MONOCYTES RELATIVE PERCENT: 8.5 % (ref 0–4)
NUCLEATED RBC %: 0 %
PDW BLD-RTO: 14.8 % (ref 11.7–14.9)
PLATELET # BLD: 57 K/CU MM (ref 140–440)
PMV BLD AUTO: 12.7 FL (ref 7.5–11.1)
POTASSIUM SERPL-SCNC: 4.7 MMOL/L (ref 3.5–5.1)
RBC # BLD: 3.22 M/CU MM (ref 4.6–6.2)
REASON FOR REJECTION: NORMAL
REJECTED TEST: NORMAL
SEGMENTED NEUTROPHILS ABSOLUTE COUNT: 5.8 K/CU MM
SEGMENTED NEUTROPHILS RELATIVE PERCENT: 74.9 % (ref 36–66)
SODIUM BLD-SCNC: 138 MMOL/L (ref 135–145)
SPECIMEN: NORMAL
SPECIMEN: NORMAL
TOTAL IMMATURE NEUTOROPHIL: 0.05 K/CU MM
TOTAL NUCLEATED RBC: 0 K/CU MM
WBC # BLD: 7.7 K/CU MM (ref 4–10.5)

## 2020-09-06 PROCEDURE — 83010 ASSAY OF HAPTOGLOBIN QUANT: CPT

## 2020-09-06 PROCEDURE — 6370000000 HC RX 637 (ALT 250 FOR IP): Performed by: INTERNAL MEDICINE

## 2020-09-06 PROCEDURE — 94761 N-INVAS EAR/PLS OXIMETRY MLT: CPT

## 2020-09-06 PROCEDURE — 1200000000 HC SEMI PRIVATE

## 2020-09-06 PROCEDURE — 99233 SBSQ HOSP IP/OBS HIGH 50: CPT | Performed by: SURGERY

## 2020-09-06 PROCEDURE — 94150 VITAL CAPACITY TEST: CPT

## 2020-09-06 PROCEDURE — 2580000003 HC RX 258: Performed by: INTERNAL MEDICINE

## 2020-09-06 PROCEDURE — 2580000003 HC RX 258: Performed by: PHYSICIAN ASSISTANT

## 2020-09-06 PROCEDURE — 85384 FIBRINOGEN ACTIVITY: CPT

## 2020-09-06 PROCEDURE — 80048 BASIC METABOLIC PNL TOTAL CA: CPT

## 2020-09-06 PROCEDURE — 6370000000 HC RX 637 (ALT 250 FOR IP): Performed by: SURGERY

## 2020-09-06 PROCEDURE — 85025 COMPLETE CBC W/AUTO DIFF WBC: CPT

## 2020-09-06 PROCEDURE — 83615 LACTATE (LD) (LDH) ENZYME: CPT

## 2020-09-06 RX ADMIN — PANTOPRAZOLE SODIUM 40 MG: 40 TABLET, DELAYED RELEASE ORAL at 16:44

## 2020-09-06 RX ADMIN — COLLAGENASE SANTYL: 250 OINTMENT TOPICAL at 16:01

## 2020-09-06 RX ADMIN — SODIUM CHLORIDE, PRESERVATIVE FREE 10 ML: 5 INJECTION INTRAVENOUS at 08:29

## 2020-09-06 RX ADMIN — TRAMADOL HYDROCHLORIDE 50 MG: 50 TABLET, FILM COATED ORAL at 08:29

## 2020-09-06 RX ADMIN — QUETIAPINE FUMARATE 100 MG: 100 TABLET ORAL at 21:29

## 2020-09-06 RX ADMIN — COLLAGENASE SANTYL: 250 OINTMENT TOPICAL at 05:41

## 2020-09-06 RX ADMIN — QUETIAPINE FUMARATE 100 MG: 100 TABLET ORAL at 08:29

## 2020-09-06 RX ADMIN — PANTOPRAZOLE SODIUM 40 MG: 40 TABLET, DELAYED RELEASE ORAL at 05:41

## 2020-09-06 RX ADMIN — SODIUM CHLORIDE, PRESERVATIVE FREE 10 ML: 5 INJECTION INTRAVENOUS at 08:30

## 2020-09-06 RX ADMIN — SODIUM CHLORIDE, PRESERVATIVE FREE 10 ML: 5 INJECTION INTRAVENOUS at 21:29

## 2020-09-06 RX ADMIN — FERROUS SULFATE TAB 325 MG (65 MG ELEMENTAL FE) 325 MG: 325 (65 FE) TAB at 16:44

## 2020-09-06 RX ADMIN — METOPROLOL SUCCINATE 25 MG: 25 TABLET, EXTENDED RELEASE ORAL at 08:29

## 2020-09-06 RX ADMIN — FERROUS SULFATE TAB 325 MG (65 MG ELEMENTAL FE) 325 MG: 325 (65 FE) TAB at 08:29

## 2020-09-06 RX ADMIN — MIRTAZAPINE 45 MG: 15 TABLET, FILM COATED ORAL at 21:28

## 2020-09-06 ASSESSMENT — PAIN SCALES - WONG BAKER: WONGBAKER_NUMERICALRESPONSE: 0

## 2020-09-06 ASSESSMENT — PAIN SCALES - GENERAL
PAINLEVEL_OUTOF10: 8
PAINLEVEL_OUTOF10: 5
PAINLEVEL_OUTOF10: 0

## 2020-09-06 NOTE — PROGRESS NOTES
Hospitalist Progress Note      Name:  Darien Stone /Age/Sex: 1932  (80 y.o. male)   MRN & CSN:  [id number] & [id number] Admission Date/Time: 2020  1:17 AM   Location:  -A PCP: Mable Jackson MD         Hospital Day: 17    Assessment and Plan:   Darien Stone is a 80 y.o.  male  who presents with <principal problem not specified>    > Acute hypoxic respiratory failure due to COVID pneumonia - resolved  Was recently treated for community-acquired pneumonia and sent to [facility]  Chest x-ray on admission shows bibasilar atelectasis   ECHO with normal LVEF, Leukocytosis resolved  SARS-COV2 detected on  and  - (persitently positive)   Trend procalcitonin and CRP  Saturating in the 90s on room air since  -  Dexamethasone was discontinued by ID   Repeat covid positive, recurrent mild fever, repeat CXR no acute pathology and blood cx NTD.   - discussed with ID and surgery. Due to pt's age and condition no aggressive intervention per family . - concern about aspiration pt ( pt clears throat and cough frequently ) ,  consulted SLP  -  fever improved, no dyspnea, no N/V, no diarrhea     >Sepsis likely due to sacral decubitus ulcers, unstageable, present on admission  S/p I and D at bedside    Blood cultures negative to date, surgical culture pending   Surgery on board  Wound ostomy -local wound care   IV Ab ( vancomycin and cefepime) Dc per ID   Sepsis was ruled out  - discussed with ID and surgery, - discussed with ID and surgery. Due to pt's age and condition no aggressive intervention per family .    - fever improved    > Thrombocytopenia  -  Plt dropped down to 57 from 142    - hold lovenox, repeat labs     >Confirmed COVID -19   active infection resolved  SARS-COV2 detected on  - off oxygen and on room air, s/p Dexamethasone  - LFT high for remdesevir  Repeat covid positive.      >Acute kidney injury on CKD 3 -resolved with hydration - DC fluids      >Elevated flush, 10 mL, PRN  acetaminophen, 650 mg, Q6H PRN    Or  acetaminophen, 650 mg, Q6H PRN  polyethylene glycol, 17 g, Daily PRN  promethazine, 12.5 mg, Q6H PRN    Or  ondansetron, 4 mg, Q6H PRN          Electronically signed by Elsa Serrano MD on 9/6/2020 at 9:34 AM

## 2020-09-06 NOTE — PROGRESS NOTES
General Surgery- [facility] Day: 16    Chief Complaint on Admission: Large sacral wound      Subjective:     Cathy Guthrie is a 80 y.o. male with large sacral wound status post debridement on  . Patient denies abdominal pain. Tolerating DIET CARDIAC; Dietary Nutrition Supplements: Standard High Calorie Oral Supplement. + BM.     ROS:  Review of Systems   Unable to perform ROS: Dementia   Skin: Positive for wound. Allergies  Patient has no known allergies. Diagnosis Date    Anxiety     Depression     Hypertension     Nerves        Objective:     Vitals:    20 0825   BP: 113/68   Pulse: 90   Resp: 18   Temp: 99.2 °F (37.3 °C)   SpO2:        TEMPERATURE:  Current - Temp: 99.2 °F (37.3 °C); Max - Temp  Av.5 °F (37.5 °C)  Min: 99.2 °F (37.3 °C)  Max: 99.9 °F (37.7 °C)    I/O this shift:  In: 120 [P.O.:120]  Out: - I/O last 3 completed shifts: In: 480 [P.O.:480]  Out: 800 [Urine:800]      Physical Exam:    Physical Exam  Constitutional:       General: He is not in acute distress. HENT:      Mouth/Throat:      Mouth: Mucous membranes are moist.   Neck:      Musculoskeletal: Neck supple. Pulmonary:      Effort: Pulmonary effort is normal.   Abdominal:      Palpations: Abdomen is soft. Skin:     General: Skin is warm. Neurological:      Mental Status: Mental status is at baseline.                    Scheduled Meds:   lidocaine PF  5 mL Intradermal Once    sodium chloride flush  10 mL Intravenous 2 times per day    enoxaparin  30 mg Subcutaneous BID    collagenase   Topical BID    ferrous sulfate  325 mg Oral BID WC    sodium chloride flush  10 mL Intravenous 2 times per day    metoprolol succinate  25 mg Oral Daily    mirtazapine  45 mg Oral Nightly    QUEtiapine  100 mg Oral BID    pantoprazole  40 mg Oral BID AC     Continuous Infusions:  PRN Meds:sodium chloride flush, traMADol, sodium chloride flush, acetaminophen **OR** acetaminophen, polyethylene glycol, promethazine **OR** ondansetron      Labs/Imaging Results:   Lab Results   Component Value Date    WBC 7.7 09/06/2020    HGB 8.6 (L) 09/06/2020    HCT 27.8 (L) 09/06/2020    MCV 86.3 09/06/2020    PLT 57 (L) 09/06/2020     Lab Results   Component Value Date     09/06/2020    K 4.7 09/06/2020     09/06/2020    CO2 26 09/06/2020    BUN 36 (H) 09/06/2020    CREATININE 1.4 (H) 09/06/2020    GLUCOSE 99 09/06/2020    CALCIUM 8.2 (L) 09/06/2020    PROT 5.4 (L) 08/22/2020    LABALBU 2.6 (L) 08/22/2020    BILITOT 0.5 08/22/2020    ALKPHOS 241 (H) 08/22/2020    AST 88 (H) 08/22/2020     (H) 08/22/2020    LABGLOM 48 (L) 09/06/2020    GFRAA 58 (L) 09/06/2020       Assessment:     Patient Active Problem List:     Pneumonia due to organism     ARF (acute respiratory failure) ([address]     Essential hypertension, benign     Depressive disorder, not elsewhere classified     Acute renal failure (HCC)     Metabolic encephalopathy     SOB (shortness of breath)     Elevated liver enzymes     Acute respiratory failure (HCC)     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 Will apply Santyl to the peripheral edges and continue with the wet-to-dry dressing. Agree with patient needing diverting colostomy however family has reservations with proceeding.   Continue local wound care      Disha Schulte MD

## 2020-09-07 ENCOUNTER — APPOINTMENT (OUTPATIENT)
Dept: GENERAL RADIOLOGY | Age: 85
DRG: 853 | End: 2020-09-07
Payer: MEDICARE

## 2020-09-07 LAB
BASOPHILS ABSOLUTE: 0 K/CU MM
BASOPHILS RELATIVE PERCENT: 0.2 % (ref 0–1)
DIFFERENTIAL TYPE: ABNORMAL
EOSINOPHILS ABSOLUTE: 0.1 K/CU MM
EOSINOPHILS RELATIVE PERCENT: 2.3 % (ref 0–3)
HCT VFR BLD CALC: 26.6 % (ref 42–52)
HEMOGLOBIN: 8.2 GM/DL (ref 13.5–18)
IMMATURE NEUTROPHIL %: 0.5 % (ref 0–0.43)
LYMPHOCYTES ABSOLUTE: 1 K/CU MM
LYMPHOCYTES RELATIVE PERCENT: 17 % (ref 24–44)
MCH RBC QN AUTO: 26.6 PG (ref 27–31)
MCHC RBC AUTO-ENTMCNC: 30.8 % (ref 32–36)
MCV RBC AUTO: 86.4 FL (ref 78–100)
MONOCYTES ABSOLUTE: 0.6 K/CU MM
MONOCYTES RELATIVE PERCENT: 9 % (ref 0–4)
NUCLEATED RBC %: 0 %
PDW BLD-RTO: 14.7 % (ref 11.7–14.9)
PLATELET # BLD: 145 K/CU MM (ref 140–440)
PMV BLD AUTO: 10.4 FL (ref 7.5–11.1)
RBC # BLD: 3.08 M/CU MM (ref 4.6–6.2)
SEGMENTED NEUTROPHILS ABSOLUTE COUNT: 4.3 K/CU MM
SEGMENTED NEUTROPHILS RELATIVE PERCENT: 71 % (ref 36–66)
TOTAL IMMATURE NEUTOROPHIL: 0.03 K/CU MM
TOTAL NUCLEATED RBC: 0 K/CU MM
WBC # BLD: 6.1 K/CU MM (ref 4–10.5)

## 2020-09-07 PROCEDURE — 1200000000 HC SEMI PRIVATE

## 2020-09-07 PROCEDURE — 2580000003 HC RX 258: Performed by: PHYSICIAN ASSISTANT

## 2020-09-07 PROCEDURE — 85025 COMPLETE CBC W/AUTO DIFF WBC: CPT

## 2020-09-07 PROCEDURE — 6360000002 HC RX W HCPCS: Performed by: HOSPITALIST

## 2020-09-07 PROCEDURE — 6370000000 HC RX 637 (ALT 250 FOR IP): Performed by: INTERNAL MEDICINE

## 2020-09-07 PROCEDURE — 94150 VITAL CAPACITY TEST: CPT

## 2020-09-07 PROCEDURE — 71045 X-RAY EXAM CHEST 1 VIEW: CPT

## 2020-09-07 PROCEDURE — 94761 N-INVAS EAR/PLS OXIMETRY MLT: CPT

## 2020-09-07 RX ADMIN — TRAMADOL HYDROCHLORIDE 50 MG: 50 TABLET, FILM COATED ORAL at 04:39

## 2020-09-07 RX ADMIN — FERROUS SULFATE TAB 325 MG (65 MG ELEMENTAL FE) 325 MG: 325 (65 FE) TAB at 15:55

## 2020-09-07 RX ADMIN — ENOXAPARIN SODIUM 30 MG: 30 INJECTION SUBCUTANEOUS at 20:57

## 2020-09-07 RX ADMIN — COLLAGENASE SANTYL: 250 OINTMENT TOPICAL at 09:06

## 2020-09-07 RX ADMIN — COLLAGENASE SANTYL: 250 OINTMENT TOPICAL at 15:56

## 2020-09-07 RX ADMIN — QUETIAPINE FUMARATE 100 MG: 100 TABLET ORAL at 20:57

## 2020-09-07 RX ADMIN — METOPROLOL SUCCINATE 25 MG: 25 TABLET, EXTENDED RELEASE ORAL at 09:06

## 2020-09-07 RX ADMIN — SODIUM CHLORIDE, PRESERVATIVE FREE 10 ML: 5 INJECTION INTRAVENOUS at 20:57

## 2020-09-07 RX ADMIN — PANTOPRAZOLE SODIUM 40 MG: 40 TABLET, DELAYED RELEASE ORAL at 05:14

## 2020-09-07 RX ADMIN — PANTOPRAZOLE SODIUM 40 MG: 40 TABLET, DELAYED RELEASE ORAL at 15:55

## 2020-09-07 RX ADMIN — COLLAGENASE SANTYL: 250 OINTMENT TOPICAL at 04:35

## 2020-09-07 RX ADMIN — SODIUM CHLORIDE, PRESERVATIVE FREE 10 ML: 5 INJECTION INTRAVENOUS at 09:06

## 2020-09-07 RX ADMIN — MIRTAZAPINE 45 MG: 15 TABLET, FILM COATED ORAL at 20:57

## 2020-09-07 RX ADMIN — FERROUS SULFATE TAB 325 MG (65 MG ELEMENTAL FE) 325 MG: 325 (65 FE) TAB at 09:06

## 2020-09-07 RX ADMIN — QUETIAPINE FUMARATE 100 MG: 100 TABLET ORAL at 09:06

## 2020-09-07 RX ADMIN — ENOXAPARIN SODIUM 30 MG: 30 INJECTION SUBCUTANEOUS at 13:26

## 2020-09-07 ASSESSMENT — PAIN SCALES - PAIN ASSESSMENT IN ADVANCED DEMENTIA (PAINAD)
BREATHING: 0
BODYLANGUAGE: 0
BODYLANGUAGE: 0
BREATHING: 0
FACIALEXPRESSION: 0
BREATHING: 0
BREATHING: 0
CONSOLABILITY: 0
BODYLANGUAGE: 0
FACIALEXPRESSION: 0
NEGVOCALIZATION: 1
CONSOLABILITY: 0
BODYLANGUAGE: 0
FACIALEXPRESSION: 0
FACIALEXPRESSION: 0
TOTALSCORE: 1
TOTALSCORE: 1
NEGVOCALIZATION: 1
TOTALSCORE: 1
CONSOLABILITY: 0
NEGVOCALIZATION: 1
CONSOLABILITY: 0
TOTALSCORE: 1
NEGVOCALIZATION: 1

## 2020-09-07 ASSESSMENT — PAIN SCALES - GENERAL
PAINLEVEL_OUTOF10: 0
PAINLEVEL_OUTOF10: 7

## 2020-09-07 ASSESSMENT — PAIN DESCRIPTION - PROGRESSION: CLINICAL_PROGRESSION: GRADUALLY WORSENING

## 2020-09-07 ASSESSMENT — PAIN DESCRIPTION - ONSET: ONSET: ON-GOING

## 2020-09-07 ASSESSMENT — PAIN - FUNCTIONAL ASSESSMENT: PAIN_FUNCTIONAL_ASSESSMENT: PREVENTS OR INTERFERES SOME ACTIVE ACTIVITIES AND ADLS

## 2020-09-07 ASSESSMENT — PAIN DESCRIPTION - FREQUENCY: FREQUENCY: INTERMITTENT

## 2020-09-07 ASSESSMENT — PAIN DESCRIPTION - PAIN TYPE: TYPE: ACUTE PAIN

## 2020-09-07 ASSESSMENT — PAIN DESCRIPTION - LOCATION: LOCATION: BUTTOCKS;SACRUM

## 2020-09-07 NOTE — PROGRESS NOTES
Hospitalist Progress Note      Name:  Ilana Benitez /Age/Sex: 1932  (80 y.o. male)   MRN & CSN:  [id number] & [id number] Admission Date/Time: 2020  1:17 AM   Location:  -A PCP: Chin Connolly MD         Hospital Day: 18    Assessment and Plan:   Ilana Benitez is a 80 y.o.  male  who presents with <principal problem not specified>    > Acute hypoxic respiratory failure due to COVID pneumonia - resolved  Was recently treated for community-acquired pneumonia and sent to [facility]  Chest x-ray on admission shows bibasilar atelectasis   ECHO with normal LVEF, Leukocytosis resolved  SARS-COV2 detected on  and  - (persitently positive)   Trend procalcitonin and CRP  Saturating in the 90s on room air since  -  Dexamethasone was discontinued by ID   Repeat covid positive, recurrent mild fever, repeat CXR no acute pathology and blood cx NTD.   - discussed with ID and surgery. Due to pt's age and condition no aggressive intervention per family . - concern about aspiration pt ( pt clears throat and cough frequently ) ,  consulted SLP  -  fever improved, no dyspnea, no N/V, no diarrhea  -  hypoxia had to be placed on oxygen NC 2 L/min, check CXR, SLP consult pending.      >Sepsis likely due to sacral decubitus ulcers, unstageable, present on admission  S/p I and D at bedside    Blood cultures negative to date, surgical culture pending   Surgery on board  Wound ostomy -local wound care   IV Ab ( vancomycin and cefepime) Dc per ID   Sepsis was ruled out  - discussed with ID and surgery, - discussed with ID and surgery. Due to pt's age and condition no aggressive intervention per family .    - fever improved    > >Thrombocytopenia- possible lab error  -  Plt dropped down to 57 from 142 , hold lovenox,   - repeat lab 145  restart lovenox     >Confirmed COVID -19   active infection resolved  SARS-COV2 detected on  - off oxygen and on room air, s/p Dexamethasone  - LFT high for remdesevir  Repeat covid positive.      >Acute kidney injury on CKD 3 -resolved with hydration - DC fluids      >Elevated liver enzymes -Stable   Recent hepatitis panel negative  Recommend follow-up as outpatient with GI     >Elevated d-dimer -VQ scan was done on admission, shows low probability of PE      >Chronic issues   Hypertension   Depression  COPD, not in exacerbation  Moderate PCM   Anemia of chronic disease     Diet DIET CARDIAC; Dietary Nutrition Supplements: Standard High Calorie Oral Supplement   DVT Prophylaxis ? Lovenox ( 9/6 hold due to drop in platelets, repeat level )    GI Prophylaxis ? PPI   Code Status Limited   Disposition  back to SNF pending precert     History of Present Illness:     Pt S&E. No abd pain, no dyspnea, no abd pain, improved    10-14 point ROS reviewed negative, unless as noted above    Objective: Intake/Output Summary (Last 24 hours) at 9/7/2020 0936  Last data filed at 9/7/2020 0431  Gross per 24 hour   Intake 130 ml   Output 650 ml   Net -520 ml      Vitals:   Vitals:    09/07/20 0900   BP: 108/65   Pulse: 81   Resp: 21   Temp: 99 °F (37.2 °C)   SpO2: 92%     Physical Exam:      GEN Awake male, cooperative, no apparent distress. RESP Decreased air sounds . Symmetric chest movement . CARDIO/VASC  Regular rate. GI Abdomen is soft without significant tenderness  MSK No gross joint deformities. SKIN Sacral Decubitus ulcer with clean edges, no thick discharge, no edema  NEURO normal speech,  PSYCH Awake, alert, oriented. Affect appropriate.     Medications:   Medications:    collagenase   Topical Daily    lidocaine PF  5 mL Intradermal Once    sodium chloride flush  10 mL Intravenous 2 times per day    collagenase   Topical BID    ferrous sulfate  325 mg Oral BID WC    sodium chloride flush  10 mL Intravenous 2 times per day    metoprolol succinate  25 mg Oral Daily    mirtazapine  45 mg Oral Nightly    QUEtiapine  100 mg Oral BID    pantoprazole  40 mg Oral BID AC      Infusions:   PRN Meds: sodium chloride flush, 10 mL, PRN  traMADol, 50 mg, Q6H PRN  sodium chloride flush, 10 mL, PRN  acetaminophen, 650 mg, Q6H PRN    Or  acetaminophen, 650 mg, Q6H PRN  polyethylene glycol, 17 g, Daily PRN  promethazine, 12.5 mg, Q6H PRN    Or  ondansetron, 4 mg, Q6H PRN          Electronically signed by Mikki Claros MD on 9/7/2020 at 9:36 AM

## 2020-09-08 LAB
BASOPHILS ABSOLUTE: 0 K/CU MM
BASOPHILS RELATIVE PERCENT: 0.4 % (ref 0–1)
DIFFERENTIAL TYPE: ABNORMAL
EOSINOPHILS ABSOLUTE: 0.2 K/CU MM
EOSINOPHILS RELATIVE PERCENT: 3.4 % (ref 0–3)
HCT VFR BLD CALC: 27.9 % (ref 42–52)
HEMOGLOBIN: 8.4 GM/DL (ref 13.5–18)
IMMATURE NEUTROPHIL %: 0.4 % (ref 0–0.43)
LYMPHOCYTES ABSOLUTE: 1.1 K/CU MM
LYMPHOCYTES RELATIVE PERCENT: 19.9 % (ref 24–44)
MCH RBC QN AUTO: 26.6 PG (ref 27–31)
MCHC RBC AUTO-ENTMCNC: 30.1 % (ref 32–36)
MCV RBC AUTO: 88.3 FL (ref 78–100)
MONOCYTES ABSOLUTE: 0.6 K/CU MM
MONOCYTES RELATIVE PERCENT: 9.9 % (ref 0–4)
NUCLEATED RBC %: 0 %
PDW BLD-RTO: 14.7 % (ref 11.7–14.9)
PLATELET # BLD: 61 K/CU MM (ref 140–440)
PMV BLD AUTO: 11.4 FL (ref 7.5–11.1)
RBC # BLD: 3.16 M/CU MM (ref 4.6–6.2)
SEGMENTED NEUTROPHILS ABSOLUTE COUNT: 3.7 K/CU MM
SEGMENTED NEUTROPHILS RELATIVE PERCENT: 66 % (ref 36–66)
TOTAL IMMATURE NEUTOROPHIL: 0.02 K/CU MM
TOTAL NUCLEATED RBC: 0 K/CU MM
WBC # BLD: 5.6 K/CU MM (ref 4–10.5)

## 2020-09-08 PROCEDURE — U0002 COVID-19 LAB TEST NON-CDC: HCPCS

## 2020-09-08 PROCEDURE — 6370000000 HC RX 637 (ALT 250 FOR IP): Performed by: INTERNAL MEDICINE

## 2020-09-08 PROCEDURE — 92610 EVALUATE SWALLOWING FUNCTION: CPT | Performed by: SPEECH-LANGUAGE PATHOLOGIST

## 2020-09-08 PROCEDURE — 99233 SBSQ HOSP IP/OBS HIGH 50: CPT | Performed by: SURGERY

## 2020-09-08 PROCEDURE — 2580000003 HC RX 258: Performed by: PHYSICIAN ASSISTANT

## 2020-09-08 PROCEDURE — 1200000000 HC SEMI PRIVATE

## 2020-09-08 PROCEDURE — 85025 COMPLETE CBC W/AUTO DIFF WBC: CPT

## 2020-09-08 PROCEDURE — 2580000003 HC RX 258: Performed by: INTERNAL MEDICINE

## 2020-09-08 PROCEDURE — 99232 SBSQ HOSP IP/OBS MODERATE 35: CPT | Performed by: INTERNAL MEDICINE

## 2020-09-08 PROCEDURE — 94761 N-INVAS EAR/PLS OXIMETRY MLT: CPT

## 2020-09-08 RX ADMIN — TRAMADOL HYDROCHLORIDE 50 MG: 50 TABLET, FILM COATED ORAL at 04:19

## 2020-09-08 RX ADMIN — COLLAGENASE SANTYL: 250 OINTMENT TOPICAL at 18:20

## 2020-09-08 RX ADMIN — PANTOPRAZOLE SODIUM 40 MG: 40 TABLET, DELAYED RELEASE ORAL at 18:35

## 2020-09-08 RX ADMIN — PANTOPRAZOLE SODIUM 40 MG: 40 TABLET, DELAYED RELEASE ORAL at 05:08

## 2020-09-08 RX ADMIN — QUETIAPINE FUMARATE 100 MG: 100 TABLET ORAL at 10:06

## 2020-09-08 RX ADMIN — COLLAGENASE SANTYL: 250 OINTMENT TOPICAL at 03:54

## 2020-09-08 RX ADMIN — FERROUS SULFATE TAB 325 MG (65 MG ELEMENTAL FE) 325 MG: 325 (65 FE) TAB at 10:06

## 2020-09-08 RX ADMIN — MIRTAZAPINE 45 MG: 15 TABLET, FILM COATED ORAL at 19:57

## 2020-09-08 RX ADMIN — SODIUM CHLORIDE, PRESERVATIVE FREE 10 ML: 5 INJECTION INTRAVENOUS at 10:06

## 2020-09-08 RX ADMIN — QUETIAPINE FUMARATE 100 MG: 100 TABLET ORAL at 19:57

## 2020-09-08 RX ADMIN — FERROUS SULFATE TAB 325 MG (65 MG ELEMENTAL FE) 325 MG: 325 (65 FE) TAB at 18:35

## 2020-09-08 RX ADMIN — SODIUM CHLORIDE, PRESERVATIVE FREE 10 ML: 5 INJECTION INTRAVENOUS at 19:58

## 2020-09-08 RX ADMIN — METOPROLOL SUCCINATE 25 MG: 25 TABLET, EXTENDED RELEASE ORAL at 10:06

## 2020-09-08 RX ADMIN — SODIUM CHLORIDE, PRESERVATIVE FREE 10 ML: 5 INJECTION INTRAVENOUS at 19:57

## 2020-09-08 RX ADMIN — SODIUM CHLORIDE, PRESERVATIVE FREE 10 ML: 5 INJECTION INTRAVENOUS at 10:07

## 2020-09-08 RX ADMIN — COLLAGENASE SANTYL: 250 OINTMENT TOPICAL at 03:53

## 2020-09-08 ASSESSMENT — PAIN SCALES - PAIN ASSESSMENT IN ADVANCED DEMENTIA (PAINAD)
BREATHING: 0
TOTALSCORE: 1
TOTALSCORE: 1
BODYLANGUAGE: 0
BREATHING: 0
BREATHING: 0
FACIALEXPRESSION: 0
NEGVOCALIZATION: 1
CONSOLABILITY: 0
NEGVOCALIZATION: 1
FACIALEXPRESSION: 0
BREATHING: 0
FACIALEXPRESSION: 0
TOTALSCORE: 1
FACIALEXPRESSION: 0
BREATHING: 0
TOTALSCORE: 1
BODYLANGUAGE: 0
BREATHING: 0
CONSOLABILITY: 0
CONSOLABILITY: 0
BREATHING: 0
TOTALSCORE: 1
NEGVOCALIZATION: 1
BREATHING: 0
CONSOLABILITY: 0
CONSOLABILITY: 0
FACIALEXPRESSION: 0
BODYLANGUAGE: 0
NEGVOCALIZATION: 1
FACIALEXPRESSION: 0
CONSOLABILITY: 0
TOTALSCORE: 1
CONSOLABILITY: 0
FACIALEXPRESSION: 0
BODYLANGUAGE: 0
FACIALEXPRESSION: 0
NEGVOCALIZATION: 1
BODYLANGUAGE: 0
NEGVOCALIZATION: 1
CONSOLABILITY: 0
TOTALSCORE: 1
TOTALSCORE: 1

## 2020-09-08 ASSESSMENT — PAIN SCALES - GENERAL
PAINLEVEL_OUTOF10: 0

## 2020-09-08 ASSESSMENT — PAIN SCALES - WONG BAKER: WONGBAKER_NUMERICALRESPONSE: 0

## 2020-09-08 NOTE — PROGRESS NOTES
Infectious Disease Progress Note  2020   Patient Name: Gage King : 1932       Reason for visit: F/u COVID-19, stage IV sacral decubitus ulcer ? History:? Interval history noted  Dementia, denies any complaints. Continues to have temperatures in the range of 99.9,  Physical Exam:  Vital Signs: BP (!) 140/72   Pulse 94   Temp 99 °F (37.2 °C) (Oral)   Resp 24   Ht 5' 7.99\" (1.727 m)   Wt 179 lb 14.3 oz (81.6 kg)   SpO2 97%   BMI 27.36 kg/m²     Gen: alert and oriented, memory deficits  Skin: no stigmata of endocarditis  Wounds: Stage IV sacral decubitus ulcer, with areas of necrosis   HEMT: AT/NC Oropharynx pink, moist, and without lesions or exudates; dentition in good state of repair  Eyes: PERRLA, EOMI, conjunctiva pink, sclera anicteric. Neck: Supple. Trachea midline. No LAD. Chest: no distress and CTA. Good air movement. Heart: RRR and no MRG. Abd: soft, non-distended, no tenderness, no hepatomegaly. Normoactive bowel sounds. Ext: no clubbing, cyanosis, or edema  Catheter Site: without erythema or tenderness  Neuro: Mental status intact. CN 2-12 intact and no focal sensory or motor deficits     Radiologic / Imaging / TESTING  No results found.      Labs:    Recent Results (from the past 24 hour(s))   CBC auto differential    Collection Time: 20  4:25 AM   Result Value Ref Range    WBC 5.6 4.0 - 10.5 K/CU MM    RBC 3.16 (L) 4.6 - 6.2 M/CU MM    Hemoglobin 8.4 (L) 13.5 - 18.0 GM/DL    Hematocrit 27.9 (L) 42 - 52 %    MCV 88.3 78 - 100 FL    MCH 26.6 (L) 27 - 31 PG    MCHC 30.1 (L) 32.0 - 36.0 %    RDW 14.7 11.7 - 14.9 %    Platelets 61 (L) 086 - 440 K/CU MM    MPV 11.4 (H) 7.5 - 11.1 FL    Differential Type AUTOMATED DIFFERENTIAL     Segs Relative 66.0 36 - 66 %    Lymphocytes % 19.9 (L) 24 - 44 %    Monocytes % 9.9 (H) 0 - 4 %    Eosinophils % 3.4 (H) 0 - 3 %    Basophils % 0.4 0 - 1 %    Segs Absolute 3.7 K/CU MM    Lymphocytes Absolute 1.1 K/CU MM    Monocytes Absolute 0.6 K/CU MM    Eosinophils Absolute 0.2 K/CU MM    Basophils Absolute 0.0 K/CU MM    Nucleated RBC % 0.0 %    Total Nucleated RBC 0.0 K/CU MM    Total Immature Neutrophil 0.02 K/CU MM    Immature Neutrophil % 0.4 0 - 0.43 %     CULTURE results: Invalid input(s): BLOOD CULTURE,  URINE CULTURE, SURGICAL CULTURE    Diagnosis:  Patient Active Problem List   Diagnosis    Pneumonia due to organism    ARF (acute respiratory failure) ([address]    Essential hypertension, benign    Depressive disorder, not elsewhere classified    Acute renal failure ([address]    Metabolic encephalopathy    SOB (shortness of breath)    Elevated liver enzymes    Acute respiratory failure (HCC)    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 *      Impression and plan   Clinical status: stable   Therapeutic:   Diagnostic: Repeat CRP, trend procalcitonin   F/u: 9/1-blood cx ngtd  MRSA nares   Other:   Summary: Continues to have temperatures in the range of 99.9 °F, concerned that there might be ongoing viral replication. Repeat SARS-CoV-2 test positive on 8/21 and 8/29. COVID-19 test once more repeated and remains positive. CXR no pneumonia. F/u blood cx.suspect fecal contamination of the the sacral decubitus. D/w Dr. Elly Richardson. Surgical re-evaluation for diverting colostomy should be made.  .      Electronically signed by: Electronically signed by Aliza Henderson MD on 9/8/2020 at 10:43 AM

## 2020-09-08 NOTE — PROGRESS NOTES
Speech Language Pathology  Facility/Department: [facility] ICU STEPDOWN   CLINICAL BEDSIDE SWALLOW EVALUATION    NAME: Amelia Davila  : 1932  MRN: [id number]    Impression  Dysphagia Diagnosis: Mild pharyngeal stage dysphagia;Mild oral stage dysphagia  Dysphagia Outcome Severity Scale: Level 5: Mild dysphagia- Distant supervision. May need one diet consistency restricted     Amelia Davila was seen for a clinical bedside swallow evaluation following admission for acute hypoxic respiratory failure 2/2 Covid pneumonia, Covid-19, decubitus ulcers. Pt was alert, pleasant, and cooperative. He was unable to follow most commands for oral mechanism exam which was therefore limited. Normal lingual protrusion with no focal or generalized weakness appreciated for all structures. Vocal quality and cough were WFL. PO trials of soft and pureed solids, nectar and thin liquids by spoon, cup, and straw completed. The oral phase was mildly impaired due to prolonged mastication for soft solids requiring liquid bolus to soften. Adequate a-p transit and clearance for all textures. The pharyngeal phase appeared mildly impaired with immediate cough for thins by cup and suspected deep penetration vs aspiration.  0 s/s aspiration for all other textures. Laryngeal excursion appeared grossly intact. Recommend:  Dysphagia 2/Nectar thick liquids by cup or straw. Aspiration precautions. Total/assist feed. ST will monitor for safe tolerance. 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);  Pneumonia due to COVID-19 virus; Osteomyelitis of sacrum ([address] and Moderate malnutrition (HCC) on their problem list.  ONSET DATE: dysphagia on prior admissions    Recent Chest Xray/CT of Chest:    Narrative    EXAMINATION:    ONE XRAY VIEW OF THE CHEST         9/7/2020 11:56 am         COMPARISON:    Chest radiograph 09/01/2020.         HISTORY:    ORDERING SYSTEM PROVIDED HISTORY: hypoxia,    TECHNOLOGIST PROVIDED HISTORY:    Reason for exam:->hypoxia,    Reason for Exam: hypoxia    Acuity: Unknown    Type of Exam: Unknown         FINDINGS:    The cardiomediastinal silhouette is within normal limits.  Shallow    inspiration.  Mild left basilar opacities.  No pneumothorax or pleural    effusion.  No acute osseous abnormality.              Impression    Shallow inspiration with mild left basilar opacities compatible with    atelectasis.  In the appropriate clinical setting pneumonia is not excluded.           Date of Eval: 9/8/2020  Evaluating Therapist: Roxanne Khan    Current Diet level:  Current Diet : Regular  Current Liquid Diet : Thin    Primary Complaint  Patient Complaint: does not voice c/o    Pain:  Pain Assessment  Pain Assessment: 0-10  Pain Level: 0  Sheehan-Baker Pain Rating: No hurt  Patient's Stated Pain Goal: No pain  Pain Type: Acute pain  Pain Location: Buttocks, Sacrum  Pain Descriptors: Discomfort, Constant  Pain Frequency: Intermittent  Pain Onset: On-going  Clinical Progression: Gradually worsening  Functional Pain Assessment: Prevents or interferes some active activities and ADLs  Response to Pain Intervention: Patient Satisfied  PAINAD (Pain Assessment in Advance Dementia)  Breathing: normal  Negative Vocalization: occasional moan/groan, low speech, negative/disapproving quality  Facial Expression: smiling or inexpressive  Body Language: relaxed  Consolability: no need to console  PAINAD Score: 1    Reason for Referral  Easton Dominguez was referred for a bedside swallow evaluation to assess the efficiency of his swallow function, identify signs and symptoms of aspiration and make recommendations regarding safe dietary consistencies, effective compensatory strategies, and safe eating environment. Treatment Plan  Requires SLP Intervention: Yes  Duration/Frequency of Treatment: 2 x/week x 1 week  D/C Recommendations: To be determined       Recommended Diet and Intervention  Diet Solids Recommendation: Dysphagia Minced and Moist (Dysphagia II)  Liquid Consistency Recommendation: Mildly Thick (Nectar)  Recommended Form of Meds: PO  Recommendations: Dysphagia treatment  Therapeutic Interventions: Diet tolerance monitoring    Compensatory Swallowing Strategies  Compensatory Swallowing Strategies: Upright as possible for all oral intake    Treatment/Goals  Short-term Goals  Timeframe for Short-term Goals: 1 week  Goal 1: Pt will tolerate Dysphagia 2 diet and Nectar thick liquids by cup or straw with no clinical evidence for aspiration 100%  Long-term Goals  Timeframe for Long-term Goals: n/a    General  Chart Reviewed: Yes  Behavior/Cognition: Alert; Cooperative;Pleasant mood  Respiratory Status: O2 via nasual cannula  Communication Observation: Functional  Follows Directions: (few simple)  Dentition: Edentulous  Patient Positioning: Upright in bed  Baseline Vocal Quality: Normal  Volitional Cough: Strong  Prior Dysphagia History: dysphagia on previous admissions with recommendation for nectar thick liquids  Consistencies Administered: Dysphagia Minced and Moist (Dysphagia II); Dysphagia Pureed (Dysphagia I); Nectar - straw;Nectar - cup;Nectar - teaspoon; Thin - cup; Thin - teaspoon           Vision/Hearing  Vision  Vision: Within Functional Limits  Hearing  Hearing: Within functional limits    Oral Motor Deficits  Oral/Motor  Oral Motor:  Within functional limits    Oral Phase Dysfunction  Oral Phase  Oral Phase: Exceptions  Oral Phase Dysfunction  Impaired Mastication: Soft Solid     Indicators of Pharyngeal Phase Dysfunction   Pharyngeal Phase  Pharyngeal Phase: Exceptions  Indicators of

## 2020-09-08 NOTE — CARE COORDINATION
Plan remains for patient to return to [facility] on discharge.  Case Management following

## 2020-09-08 NOTE — PROGRESS NOTES
Hospitalist Progress Note      Name:  Bessie Rico /Age/Sex: 1932  (80 y.o. male)   MRN & CSN:  [id number] & [id number] Admission Date/Time: 2020  1:17 AM   Location:  -A PCP: Nathan Mullins MD         Hospital Day:     Assessment and Plan:   eBssie Rico is a 80 y.o.  male  who presents with fever and hypoxia.    > Sepsis secondary to pneumonia versus sacral decubitus ulcers  > Acute hypoxic respiratory failure due to COVID pneumonia - resolved  > COVID-19 positive  -SIRS /  -SARS-COV2 detected on  and  and - (persitently positive). Will repeat today  -procalcitonin stable and  on 2020. We will repeat  -saturating in the  90s on 2 L nasal cannula  -Dexamethasone was discontinued by ID  -Blood cultures no growth x5 days  - discussed with ID and surgery. Due to pt's age and condition no aggressive intervention per family . - concern about aspiration pt,   speech therapy consulted      >Sepsis likely due to sacral decubitus ulcers, unstageable, present on admission  >diverting colostomy   -S/p I and D at bedside    -Blood cultures negative to date, surgical culture pending   -Wound ostomy   -local wound care   -Off antibiotics per ID  -Per family no aggressive intervention at this time   -Per ID gen surg consulted for evaluation of diverting colostomy  -ID and general surgery on board     > >Thrombocytopenia- possible lab error  -Plt dropped down to 61 from 145 /, hold lovenox  -Similar presentation happened 2 days ago we will monitor     >Acute kidney injury on CKD 3 -resolved w     >Elevated liver enzymes -Stable   Recent hepatitis panel negative  Recommend follow-up as outpatient with GI     >Elevated d-dimer -VQ scan was done on admission, shows low probability of PE      >Chronic issues   Hypertension   Depression  COPD, not in exacerbation  Moderate PCM   Anemia of chronic disease    Diet DIET CARDIAC;   Dietary Nutrition Supplements: lateralizing weakness. PSYCH Awake, alert, oriented x 4. Affect appropriate.     Medications:   Medications:    enoxaparin  30 mg Subcutaneous BID    collagenase   Topical Daily    lidocaine PF  5 mL Intradermal Once    sodium chloride flush  10 mL Intravenous 2 times per day    collagenase   Topical BID    ferrous sulfate  325 mg Oral BID WC    sodium chloride flush  10 mL Intravenous 2 times per day    metoprolol succinate  25 mg Oral Daily    mirtazapine  45 mg Oral Nightly    QUEtiapine  100 mg Oral BID    pantoprazole  40 mg Oral BID AC      Infusions:   PRN Meds: sodium chloride flush, 10 mL, PRN  traMADol, 50 mg, Q6H PRN  sodium chloride flush, 10 mL, PRN  acetaminophen, 650 mg, Q6H PRN    Or  acetaminophen, 650 mg, Q6H PRN  polyethylene glycol, 17 g, Daily PRN  promethazine, 12.5 mg, Q6H PRN    Or  ondansetron, 4 mg, Q6H PRN          Electronically signed by Rajani Feliciano MD on 9/8/2020 at 2:54 PM

## 2020-09-08 NOTE — PROGRESS NOTES
Comprehensive Nutrition Assessment    Type and Reason for Visit:  Reassess    Nutrition Recommendations/Plan:   · Continue current diet and supplements    Nutrition Assessment:  Pt continues to consume his meals and supplements at great than 50%. He is at moderate nutritional risk at this time. Malnutrition Assessment:  Malnutrition Status: Moderate malnutrition    Context:  Acute Illness     Findings of the 6 clinical characteristics of malnutrition:  Energy Intake:  1 - 75% or less of estimated energy requirements for 7 or more days  Weight Loss:  7 - Greater than 2% over 1 week     Body Fat Loss:  Unable to assess     Muscle Mass Loss:  Unable to assess    Fluid Accumulation:  Unable to assess     Strength:  Not Performed    Estimated Daily Nutrient Needs:  Energy (kcal):  8978-9879; Weight Used for Energy Requirements:  Current     Protein (g):  105; Weight Used for Protein Requirements:  Ideal        Fluid (ml/day):  1914-4256; Weight Used for Fluid Requirements:  Current      Wounds:  Stage IV, Pressure Injury       Current Nutrition Therapies:    DIET CARDIAC; Dietary Nutrition Supplements: Standard High Calorie Oral Supplement    Anthropometric Measures:  · Height: 5' 7.99\" (172.7 cm)  · Current Body Weight: 179 lb (81.2 kg)   · Admission Body Weight: 197 lb 8.5 oz (89.6 kg)    · Usual Body Weight: 196 lb (88.9 kg)     · Ideal Body Weight: 154 lbs; % Ideal Body Weight 117.5 %   · BMI: 27.2   · BMI Categories: Overweight (BMI 25.0-29. 9)       Nutrition Diagnosis:   · Inadequate energy intake related to acute injury/trauma as evidenced by intake 26-50%      Nutrition Interventions:   Food and/or Nutrient Delivery:  Continue Oral Nutrition Supplement, Continue Current Diet  Nutrition Education/Counseling:  No recommendation at this time   Coordination of Nutrition Care:  Continued Inpatient Monitoring    Goals:  pt will consume greater than 75% of emals and supplements       Nutrition Monitoring

## 2020-09-09 ENCOUNTER — ANESTHESIA EVENT (OUTPATIENT)
Dept: OPERATING ROOM | Age: 85
DRG: 853 | End: 2020-09-09
Payer: MEDICARE

## 2020-09-09 ENCOUNTER — ANESTHESIA (OUTPATIENT)
Dept: OPERATING ROOM | Age: 85
DRG: 853 | End: 2020-09-09
Payer: MEDICARE

## 2020-09-09 LAB
ANION GAP SERPL CALCULATED.3IONS-SCNC: 8 MMOL/L (ref 4–16)
BASOPHILS ABSOLUTE: 0 K/CU MM
BASOPHILS RELATIVE PERCENT: 0.4 % (ref 0–1)
BUN BLDV-MCNC: 36 MG/DL (ref 6–23)
CALCIUM SERPL-MCNC: 8.2 MG/DL (ref 8.3–10.6)
CHLORIDE BLD-SCNC: 105 MMOL/L (ref 99–110)
CO2: 28 MMOL/L (ref 21–32)
CREAT SERPL-MCNC: 1.4 MG/DL (ref 0.9–1.3)
DIFFERENTIAL TYPE: ABNORMAL
EOSINOPHILS ABSOLUTE: 0.2 K/CU MM
EOSINOPHILS RELATIVE PERCENT: 3.5 % (ref 0–3)
GFR AFRICAN AMERICAN: 58 ML/MIN/1.73M2
GFR NON-AFRICAN AMERICAN: 48 ML/MIN/1.73M2
GLUCOSE BLD-MCNC: 100 MG/DL (ref 70–99)
HAPTOGLOBIN: 476 MG/DL (ref 30–200)
HCT VFR BLD CALC: 25.4 % (ref 42–52)
HEMOGLOBIN: 7.4 GM/DL (ref 13.5–18)
IMMATURE NEUTROPHIL %: 0.4 % (ref 0–0.43)
LYMPHOCYTES ABSOLUTE: 0.9 K/CU MM
LYMPHOCYTES RELATIVE PERCENT: 16.4 % (ref 24–44)
MCH RBC QN AUTO: 26.2 PG (ref 27–31)
MCHC RBC AUTO-ENTMCNC: 29.1 % (ref 32–36)
MCV RBC AUTO: 90.1 FL (ref 78–100)
MONOCYTES ABSOLUTE: 0.7 K/CU MM
MONOCYTES RELATIVE PERCENT: 11.7 % (ref 0–4)
NUCLEATED RBC %: 0 %
PDW BLD-RTO: 14.8 % (ref 11.7–14.9)
PLATELET # BLD: 203 K/CU MM (ref 140–440)
PMV BLD AUTO: 10.3 FL (ref 7.5–11.1)
POTASSIUM SERPL-SCNC: 4.6 MMOL/L (ref 3.5–5.1)
RBC # BLD: 2.82 M/CU MM (ref 4.6–6.2)
SARS-COV-2, NAAT: DETECTED
SEGMENTED NEUTROPHILS ABSOLUTE COUNT: 3.8 K/CU MM
SEGMENTED NEUTROPHILS RELATIVE PERCENT: 67.6 % (ref 36–66)
SODIUM BLD-SCNC: 141 MMOL/L (ref 135–145)
SOURCE: ABNORMAL
TOTAL IMMATURE NEUTOROPHIL: 0.02 K/CU MM
TOTAL NUCLEATED RBC: 0 K/CU MM
WBC # BLD: 5.7 K/CU MM (ref 4–10.5)

## 2020-09-09 PROCEDURE — U0002 COVID-19 LAB TEST NON-CDC: HCPCS

## 2020-09-09 PROCEDURE — 2580000003 HC RX 258: Performed by: PHYSICIAN ASSISTANT

## 2020-09-09 PROCEDURE — 94150 VITAL CAPACITY TEST: CPT

## 2020-09-09 PROCEDURE — C9803 HOPD COVID-19 SPEC COLLECT: HCPCS

## 2020-09-09 PROCEDURE — 2580000003 HC RX 258: Performed by: INTERNAL MEDICINE

## 2020-09-09 PROCEDURE — 99232 SBSQ HOSP IP/OBS MODERATE 35: CPT | Performed by: INTERNAL MEDICINE

## 2020-09-09 PROCEDURE — 6370000000 HC RX 637 (ALT 250 FOR IP): Performed by: INTERNAL MEDICINE

## 2020-09-09 PROCEDURE — 94761 N-INVAS EAR/PLS OXIMETRY MLT: CPT

## 2020-09-09 PROCEDURE — 80048 BASIC METABOLIC PNL TOTAL CA: CPT

## 2020-09-09 PROCEDURE — 85025 COMPLETE CBC W/AUTO DIFF WBC: CPT

## 2020-09-09 PROCEDURE — 1200000000 HC SEMI PRIVATE

## 2020-09-09 PROCEDURE — 6360000002 HC RX W HCPCS: Performed by: NURSE PRACTITIONER

## 2020-09-09 RX ORDER — MORPHINE SULFATE 2 MG/ML
2 INJECTION, SOLUTION INTRAMUSCULAR; INTRAVENOUS ONCE
Status: COMPLETED | OUTPATIENT
Start: 2020-09-09 | End: 2020-09-09

## 2020-09-09 RX ADMIN — FERROUS SULFATE TAB 325 MG (65 MG ELEMENTAL FE) 325 MG: 325 (65 FE) TAB at 17:10

## 2020-09-09 RX ADMIN — MIRTAZAPINE 45 MG: 15 TABLET, FILM COATED ORAL at 20:29

## 2020-09-09 RX ADMIN — SODIUM CHLORIDE, PRESERVATIVE FREE 10 ML: 5 INJECTION INTRAVENOUS at 09:25

## 2020-09-09 RX ADMIN — SODIUM CHLORIDE, PRESERVATIVE FREE 10 ML: 5 INJECTION INTRAVENOUS at 09:26

## 2020-09-09 RX ADMIN — MORPHINE SULFATE 2 MG: 2 INJECTION, SOLUTION INTRAMUSCULAR; INTRAVENOUS at 04:14

## 2020-09-09 RX ADMIN — SODIUM CHLORIDE, PRESERVATIVE FREE 10 ML: 5 INJECTION INTRAVENOUS at 20:29

## 2020-09-09 RX ADMIN — COLLAGENASE SANTYL: 250 OINTMENT TOPICAL at 17:10

## 2020-09-09 RX ADMIN — FERROUS SULFATE TAB 325 MG (65 MG ELEMENTAL FE) 325 MG: 325 (65 FE) TAB at 09:24

## 2020-09-09 RX ADMIN — QUETIAPINE FUMARATE 100 MG: 100 TABLET ORAL at 09:24

## 2020-09-09 RX ADMIN — COLLAGENASE SANTYL: 250 OINTMENT TOPICAL at 04:15

## 2020-09-09 RX ADMIN — METOPROLOL SUCCINATE 25 MG: 25 TABLET, EXTENDED RELEASE ORAL at 09:24

## 2020-09-09 RX ADMIN — QUETIAPINE FUMARATE 100 MG: 100 TABLET ORAL at 20:29

## 2020-09-09 RX ADMIN — PANTOPRAZOLE SODIUM 40 MG: 40 TABLET, DELAYED RELEASE ORAL at 17:10

## 2020-09-09 ASSESSMENT — PAIN SCALES - GENERAL: PAINLEVEL_OUTOF10: 9

## 2020-09-09 NOTE — PROGRESS NOTES
Unable to obtain patients AM labs after 2 faileld attempts. Lab called and asked to draw phlebotomist stated that she would be here shortly.

## 2020-09-09 NOTE — ANESTHESIA PRE PROCEDURE
Department of Anesthesiology  Preprocedure Note       Name:  Cris Coleman   Age:  80 y.o.  :  1932                                          MRN:  [id number]         Date:  2020      Surgeon: Jayne Alfredo):  Morenita Toure MD    Procedure: Procedure(s):  EGD ESOPHAGOGASTRODUODENOSCOPY    Medications prior to admission:   Prior to Admission medications    Medication Sig Start Date End Date Taking? Authorizing Provider   pantoprazole (PROTONIX) 40 MG tablet Take 1 tablet by mouth 2 times daily (before meals) 20   Mariano Reynolds MD   furosemide (LASIX) 40 MG tablet Take 1 tablet by mouth daily for 3 days 8/7/20 8/10/20  Anh Patel MD   cloNIDine (CATAPRES) 0.1 MG tablet Take 1 tablet by mouth every 6 hours as needed (for B/P systolic > 553). 12/15/14   Laure Curran MD   QUEtiapine (SEROQUEL) 100 MG tablet Take 100 mg by mouth 2 times daily. Historical Provider, MD   metoprolol (TOPROL-XL) 25 MG XL tablet Take 25 mg by mouth daily. Historical Provider, MD   mirtazapine (REMERON) 45 MG tablet Take 45 mg by mouth nightly. Historical Provider, MD       Current medications:    No current facility-administered medications for this visit. No current outpatient medications on file.      Facility-Administered Medications Ordered in Other Visits   Medication Dose Route Frequency Provider Last Rate Last Dose    [Held by provider] enoxaparin (LOVENOX) injection 30 mg  30 mg Subcutaneous BID Glo Cedeño MD   Stopped at 20 1007    collagenase ointment   Topical Daily Domo Vance MD        lidocaine PF 1 % injection 5 mL  5 mL Intradermal Once Tracey Miller PA-C        sodium chloride flush 0.9 % injection 10 mL  10 mL Intravenous 2 times per day Tracey Miller PA-C   10 mL at 20 0925    sodium chloride flush 0.9 % injection 10 mL  10 mL Intravenous PRN Nemo Esposito PA-C        traMADol (ULTRAM) tablet 50 mg  50 mg Oral Q6H PRN Mariano Reynolds MD   50 mg at 20 0410 [facility] ([facility] E44.0       Past Medical History:        Diagnosis Date    Anxiety     Depression     Hypertension     Nerves        Past Surgical History:        Procedure Laterality Date    JOINT REPLACEMENT      right hip    UPPER GASTROINTESTINAL ENDOSCOPY N/A 2020    EGD BIOPSY performed by Natan Echeverria MD at [facility] ENDOSCOPY       Social History:    Social History     Tobacco Use    Smoking status: Former Smoker     Last attempt to quit: 1974     Years since quittin.8    Smokeless tobacco: Current User     Types: Chew   Substance Use Topics    Alcohol use: No     Comment: used to drimk on weekends stopped 2 years ago                                Ready to quit: Not Answered  Counseling given: Not Answered      Vital Signs (Current): There were no vitals filed for this visit. BP Readings from Last 3 Encounters:   20 112/65   20 105/65   20 (!) 98/50       NPO Status:                                               12 hrs.                                   BMI:   Wt Readings from Last 3 Encounters:   20 179 lb 14.3 oz (81.6 kg)   20 196 lb (88.9 kg)     There is no height or weight on file to calculate BMI.    CBC:   Lab Results   Component Value Date    WBC 5.7 2020    RBC 2.82 2020    HGB 7.4 2020    HCT 25.4 2020    MCV 90.1 2020    RDW 14.8 2020     2020       CMP:   Lab Results   Component Value Date     2020    K 4.6 2020     2020    CO2 28 2020    BUN 36 2020    CREATININE 1.4 2020    GFRAA 58 2020    LABGLOM 48 2020    GLUCOSE 100 2020    PROT 5.4 2020    PROT 6.4 10/24/2012    CALCIUM 8.2 2020    BILITOT 0.5 2020    ALKPHOS 241 2020    AST 88 2020     2020       POC Tests: No results for input(s): POCGLU, POCNA, POCK, POCCL, POCBUN, POCHEMO, POCHCT in the last 72 hours. Coags:   Lab Results   Component Value Date    PROTIME 13.9 08/04/2020    INR 1.15 08/04/2020    APTT 31.7 08/04/2020       HCG (If Applicable): No results found for: PREGTESTUR, PREGSERUM, HCG, HCGQUANT     ABGs:   Lab Results   Component Value Date    PO2ART 104 12/05/2014    PMD0TRG 38.0 12/05/2014    PXL0OUQ 21.0 12/05/2014        Type & Screen (If Applicable):  No results found for: LABABO, [address]    Drug/Infectious Status (If Applicable):  Lab Results   Component Value Date    HEPCAB NON REACTIVE 08/05/2020       COVID-19 Screening (If Applicable):   Lab Results   Component Value Date    COVID19 DETECTED 09/09/2020    COVID19 DETECTED 09/01/2020              Anesthesia Plan      MAC     ASA 3       Induction: intravenous. Anesthetic plan and risks discussed with patient. Amelia Dias, APRN - CRNA   9/9/2020      Pre Anesthesia Evaluation complete. Anesthesia plan, risks, benefits, alternatives, and personnel discussed with patient and/or legal guardian. Patient and/or legal guardian verbalized an understanding and agreed to proceed. Anesthesia plan discussed with care team members and agreed upon.   Amelia Dias, APRN - CRNA  9/9/2020

## 2020-09-09 NOTE — PROGRESS NOTES
Hospitalist Progress Note      Name:  Ilana Benitez /Age/Sex: 1932  (80 y.o. male)   MRN & CSN:  [id number] & [id number] Admission Date/Time: 2020  1:17 AM   Location:  -A PCP: Chin Connolly MD         Hospital Day:     Assessment and Plan:   Danya Mondragon a 80 y.o.  male  who presents with fever and hypoxia.     > Sepsis secondary to pneumonia versus sacral decubitus ulcers  > Acute hypoxic respiratory failure due to COVID pneumonia - resolved  > COVID-19 positive  -SIRS  monitor for tachycardia  -SARS-COV2 detected on  and  and - (persitently positive).   Repeat COVID result pending  -Continue to trend inflammatory markers  -saturating in the  90s on 2 L nasal cannula  -Blood cultures no growth x5 days  -Per family no aggressive interventions at this time   -Plan is for diverting colostomy  -ID and surgery on board    >Sepsis likely due to sacral decubitus ulcers, unstageable, present on admission  -S/p I and D at bedside    -Blood cultures negative to date, surgical culture pending   -Wound ostomy   -local wound care   -Off antibiotics per ID  -Per family no aggressive intervention at this time   -Plan is for diverting colostomy tomorrow  -Hold Lovenox  -ID and general surgery on board     > >Thrombocytopenia- possible lab error  -Patient has multiple episodes of low platelets which spontaneously recovered the next day  -Likely patient is having clumps  -We will resume Lovenox after diverting colostomy     >Acute kidney injury on CKD 3 -resolved      >Elevated liver enzymes -Stable   Recent hepatitis panel negative  Recommend follow-up as outpatient with GI     >Elevated d-dimer -VQ scan was done on admission, shows low probability of PE      >Chronic issues   Hypertension   Depression  COPD, not in exacerbation  Moderate PCM   Anemia of chronic disease      Diet Diet NPO Time Specified   DVT Prophylaxis [] Lovenox, []  Heparin, [] SCDs, [] Ambulation   GI Prophylaxis [] PPI,  [] H2 Blocker,  [] Carafate,  [] Diet/Tube Feeds   Code Status Limited   Disposition Patient requires continued admission due to sepsis from sacral decubitus ulcers and COVID-19   MDM [] Low, [] Moderate,[]  High  Patient's risk as above due to      History of Present Illness:     Chief Complaint: <principal problem not specified>  Francisco Saleh is a 80 y.o.  male  who presents with sepsis from sacral decubitus ulcers  COVID-19 positive test (U07.1, COVID-19) with Acute Pneumonia (J12.89, Other viral pneumonia)  (If respiratory failure or sepsis present, add as separate assessment)           Ten point ROS reviewed negative, unless as noted above    Objective: Intake/Output Summary (Last 24 hours) at 9/9/2020 1450  Last data filed at 9/8/2020 1838  Gross per 24 hour   Intake --   Output 350 ml   Net -350 ml      Vitals:   Vitals:    09/09/20 0802   BP: 112/65   Pulse: 81   Resp: 18   Temp: 98.7 °F (37.1 °C)   SpO2: 94%     Physical Exam:   GEN Awake male, sitting upright in bed in no apparent distress. Appears given age. EYES Pupils are equally round. No scleral erythema, discharge, or conjunctivitis. HENT Mucous membranes are moist. Oral pharynx without exudates, no evidence of thrush. NECK Supple, no apparent thyromegaly or masses. RESP Clear to auscultation, no wheezes, rales or rhonchi. Symmetric chest movement while on room air. CARDIO/VASC S1/S2 auscultated. Regular rate without appreciable murmurs, rubs, or gallops. No JVD or carotid bruits. Peripheral pulses equal bilaterally and palpable. No peripheral edema. GI Abdomen is soft without significant tenderness, masses, or guarding. Bowel sounds are normoactive. Rectal exam deferred.  No costovertebral angle tenderness. Normal appearing external genitalia. Greene catheter is not present. HEME/LYMPH No palpable cervical lymphadenopathy and no hepatosplenomegaly. No petechiae or ecchymoses. MSK sacral decubitus ulcers. no gross joint deformities. SKIN Normal coloration, warm, dry. NEURO Cranial nerves appear grossly intact, normal speech, no lateralizing weakness. PSYCH Awake, alert, oriented x 4. Affect appropriate.     Medications:   Medications:    [Held by provider] enoxaparin  30 mg Subcutaneous BID    collagenase   Topical Daily    lidocaine PF  5 mL Intradermal Once    sodium chloride flush  10 mL Intravenous 2 times per day    collagenase   Topical BID    ferrous sulfate  325 mg Oral BID WC    sodium chloride flush  10 mL Intravenous 2 times per day    metoprolol succinate  25 mg Oral Daily    mirtazapine  45 mg Oral Nightly    QUEtiapine  100 mg Oral BID    pantoprazole  40 mg Oral BID AC      Infusions:   PRN Meds: sodium chloride flush, 10 mL, PRN  traMADol, 50 mg, Q6H PRN  sodium chloride flush, 10 mL, PRN  acetaminophen, 650 mg, Q6H PRN    Or  acetaminophen, 650 mg, Q6H PRN  polyethylene glycol, 17 g, Daily PRN  promethazine, 12.5 mg, Q6H PRN    Or  ondansetron, 4 mg, Q6H PRN          Electronically signed by Greta Macias MD on 9/9/2020 at 2:50 PM

## 2020-09-09 NOTE — PROGRESS NOTES
General Surgery- [facility] Day: 23    Chief Complaint on Admission: large sacral wound      Subjective:     Amelia Davila is a 80 y.o. male with large sacral wound . Patient denies pain. Tolerating Dietary Nutrition Supplements: Standard High Calorie Oral Supplement  DIET CARDIAC; Dysphagia Minced and Moist; Mildly Thick (Nectar). + voluminous  BM.     ROS:  Review of Systems   Unable to perform ROS: Dementia   Skin: Positive for wound. Allergies  Patient has no known allergies. Diagnosis Date    Anxiety     Depression     Hypertension     Nerves        Objective:     Vitals:    20   BP:    Pulse: 86   Resp: 15   Temp:    SpO2:        TEMPERATURE:  Current - Temp: 98.5 °F (36.9 °C); Max - Temp  Av °F (37.2 °C)  Min: 98.5 °F (36.9 °C)  Max: 99.8 °F (37.7 °C)    I/O this shift:  In: -   Out: 350 [Urine:350]I/O last 3 completed shifts: In: 20 [I.V.:20]  Out: 088 [Urine:875]      Physical Exam:    Physical Exam  Constitutional:       Appearance: He is well-developed. HENT:      Head: Normocephalic. Eyes:      Pupils: Pupils are equal, round, and reactive to light. Neck:      Musculoskeletal: Normal range of motion and neck supple. Cardiovascular:      Rate and Rhythm: Normal rate. Pulmonary:      Effort: Pulmonary effort is normal.   Abdominal:      General: There is no distension. Palpations: Abdomen is soft. There is no mass. Tenderness: There is no abdominal tenderness. There is no guarding or rebound. Musculoskeletal: Normal range of motion. Back:    Skin:     General: Skin is warm. Neurological:      Mental Status: He is alert. Mental status is at baseline. Motor: Weakness present.       Gait: Gait abnormal.             Scheduled Meds:   [Held by provider] enoxaparin  30 mg Subcutaneous BID    collagenase   Topical Daily    lidocaine PF  5 mL Intradermal Once    sodium chloride flush  10 mL Intravenous 2 times per day    collagenase   Topical BID    ferrous sulfate  325 mg Oral BID WC    sodium chloride flush  10 mL Intravenous 2 times per day    metoprolol succinate  25 mg Oral Daily    mirtazapine  45 mg Oral Nightly    QUEtiapine  100 mg Oral BID    pantoprazole  40 mg Oral BID AC     Continuous Infusions:  PRN Meds:sodium chloride flush, traMADol, sodium chloride flush, acetaminophen **OR** acetaminophen, polyethylene glycol, promethazine **OR** ondansetron      Labs/Imaging Results:   Lab Results   Component Value Date    WBC 5.6 09/08/2020    HGB 8.4 (L) 09/08/2020    HCT 27.9 (L) 09/08/2020    MCV 88.3 09/08/2020    PLT 61 (L) 09/08/2020     Lab Results   Component Value Date     09/06/2020    K 4.7 09/06/2020     09/06/2020    CO2 26 09/06/2020    BUN 36 (H) 09/06/2020    CREATININE 1.4 (H) 09/06/2020    GLUCOSE 99 09/06/2020    CALCIUM 8.2 (L) 09/06/2020    PROT 5.4 (L) 08/22/2020    LABALBU 2.6 (L) 08/22/2020    BILITOT 0.5 08/22/2020    ALKPHOS 241 (H) 08/22/2020    AST 88 (H) 08/22/2020     (H) 08/22/2020    LABGLOM 48 (L) 09/06/2020    GFRAA 58 (L) 09/06/2020       Assessment:     Patient Active Problem List:     Pneumonia due to organism     ARF (acute respiratory failure) ([address]     Essential hypertension, benign     Depressive disorder, not elsewhere classified     Acute renal failure (HCC)     Metabolic encephalopathy     SOB (shortness of breath)     Elevated liver enzymes     Acute respiratory failure (HCC)     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 One pt is bed bound and it would improve his quality of life secondly, it would divert stool away from his open wound that is not amenable for wound VAC. Care discussed with daughter by phone and she wanted to speak to the rest of family.  Will keep NPO for possible surgery tomorrow.        Katie Baltazar MD

## 2020-09-09 NOTE — PROGRESS NOTES
Pts rapid covid test came back positive. Perfect Serve sent to Dr. Stephan Betancourt to notify him.       Beryl Chaudhary RN

## 2020-09-09 NOTE — PROGRESS NOTES
Infectious Disease Progress Note  2020   Patient Name: Saadia Asencio : 1932       Reason for visit: F/u COVID-19, stage IV sacral decubitus ulcer ? History:? Interval history noted  Dementia, denies any complaints. Continues to have temperatures in the range of 99.9,  Physical Exam:  Vital Signs: /65   Pulse 81   Temp 98.7 °F (37.1 °C) (Oral)   Resp 18   Ht 5' 7.99\" (1.727 m)   Wt 179 lb 14.3 oz (81.6 kg)   SpO2 94%   BMI 27.36 kg/m²     Gen: alert and oriented, memory deficits  Skin: no stigmata of endocarditis  Wounds: Stage IV sacral decubitus ulcer, with areas of necrosis   HEMT: AT/NC Oropharynx pink, moist, and without lesions or exudates; dentition in good state of repair  Eyes: PERRLA, EOMI, conjunctiva pink, sclera anicteric. Neck: Supple. Trachea midline. No LAD. Chest: no distress and CTA. Good air movement. Heart: RRR and no MRG. Abd: soft, non-distended, no tenderness, no hepatomegaly. Normoactive bowel sounds. Ext: no clubbing, cyanosis, or edema  Catheter Site: without erythema or tenderness  Neuro: Mental status intact. CN 2-12 intact and no focal sensory or motor deficits     Radiologic / Imaging / TESTING  No results found.      Labs:    Recent Results (from the past 24 hour(s))   CBC auto differential    Collection Time: 20  7:51 AM   Result Value Ref Range    WBC 5.7 4.0 - 10.5 K/CU MM    RBC 2.82 (L) 4.6 - 6.2 M/CU MM    Hemoglobin 7.4 (L) 13.5 - 18.0 GM/DL    Hematocrit 25.4 (L) 42 - 52 %    MCV 90.1 78 - 100 FL    MCH 26.2 (L) 27 - 31 PG    MCHC 29.1 (L) 32.0 - 36.0 %    RDW 14.8 11.7 - 14.9 %    Platelets 040 964 - 785 K/CU MM    MPV 10.3 7.5 - 11.1 FL    Differential Type AUTOMATED DIFFERENTIAL     Segs Relative 67.6 (H) 36 - 66 %    Lymphocytes % 16.4 (L) 24 - 44 %    Monocytes % 11.7 (H) 0 - 4 %    Eosinophils % 3.5 (H) 0 - 3 %    Basophils % 0.4 0 - 1 %    Segs Absolute 3.8 K/CU MM    Lymphocytes Absolute 0.9 K/CU MM    Monocytes Absolute 0.7 K/CU MM    Eosinophils Absolute 0.2 K/CU MM    Basophils Absolute 0.0 K/CU MM    Nucleated RBC % 0.0 %    Total Nucleated RBC 0.0 K/CU MM    Total Immature Neutrophil 0.02 K/CU MM    Immature Neutrophil % 0.4 0 - 0.43 %   Basic Metabolic Panel w/ Reflex to MG    Collection Time: 09/09/20  7:51 AM   Result Value Ref Range    Sodium 141 135 - 145 MMOL/L    Potassium 4.6 3.5 - 5.1 MMOL/L    Chloride 105 99 - 110 mMol/L    CO2 28 21 - 32 MMOL/L    Anion Gap 8 4 - 16    BUN 36 (H) 6 - 23 MG/DL    CREATININE 1.4 (H) 0.9 - 1.3 MG/DL    Glucose 100 (H) 70 - 99 MG/DL    Calcium 8.2 (L) 8.3 - 10.6 MG/DL    GFR Non- 48 (L) >60 mL/min/1.73m2    GFR  58 (L) >60 mL/min/1.73m2   COVID-19    Collection Time: 09/09/20  9:46 AM    Specimen: Nasopharyngeal Swab   Result Value Ref Range    Source THROAT     SARS-CoV-2, NAAT DETECTED (A)      CULTURE results: Invalid input(s): BLOOD CULTURE,  URINE CULTURE, SURGICAL CULTURE    Diagnosis:  Patient Active Problem List   Diagnosis    Pneumonia due to organism    [facility]    Essential hypertension, benign    Depressive disorder, not elsewhere classified    Acute renal failure (HCC)    Metabolic encephalopathy    SOB (shortness of breath)    Elevated liver enzymes    Acute respiratory failure (HCC)    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  *      Impression and plan   Clinical status: stable   Therapeutic:   Diagnostic: Repeat CRP, trend procalcitonin   F/u: 9/1-blood cx ngtd    Other:   Summary: Continues to have temperatures in the range of 99.9 °F, concerned that there might be ongoing viral replication. Repeat SARS-CoV-2 test positive on 8/21 and 8/29. COVID-19 test once more repeated and remains positive.        Electronically signed by: Electronically signed by Jani Woodson MD on 9/9/2020 at 2:28 PM

## 2020-09-10 VITALS
SYSTOLIC BLOOD PRESSURE: 113 MMHG | DIASTOLIC BLOOD PRESSURE: 84 MMHG | OXYGEN SATURATION: 100 % | RESPIRATION RATE: 9 BRPM | TEMPERATURE: 98.2 F

## 2020-09-10 LAB
ANION GAP SERPL CALCULATED.3IONS-SCNC: 12 MMOL/L (ref 4–16)
BASOPHILS ABSOLUTE: 0 K/CU MM
BASOPHILS RELATIVE PERCENT: 0.2 % (ref 0–1)
BUN BLDV-MCNC: 32 MG/DL (ref 6–23)
C-REACTIVE PROTEIN, HIGH SENSITIVITY: 92 MG/L
CALCIUM SERPL-MCNC: 8.3 MG/DL (ref 8.3–10.6)
CHLORIDE BLD-SCNC: 103 MMOL/L (ref 99–110)
CO2: 26 MMOL/L (ref 21–32)
CREAT SERPL-MCNC: 1.4 MG/DL (ref 0.9–1.3)
DIFFERENTIAL TYPE: ABNORMAL
EOSINOPHILS ABSOLUTE: 0 K/CU MM
EOSINOPHILS RELATIVE PERCENT: 0 % (ref 0–3)
GFR AFRICAN AMERICAN: 58 ML/MIN/1.73M2
GFR NON-AFRICAN AMERICAN: 48 ML/MIN/1.73M2
GLUCOSE BLD-MCNC: 125 MG/DL (ref 70–99)
HCT VFR BLD CALC: 26.8 % (ref 42–52)
HEMOGLOBIN: 7.9 GM/DL (ref 13.5–18)
IMMATURE NEUTROPHIL %: 0.5 % (ref 0–0.43)
LYMPHOCYTES ABSOLUTE: 0.6 K/CU MM
LYMPHOCYTES RELATIVE PERCENT: 6.9 % (ref 24–44)
MCH RBC QN AUTO: 26.2 PG (ref 27–31)
MCHC RBC AUTO-ENTMCNC: 29.5 % (ref 32–36)
MCV RBC AUTO: 89 FL (ref 78–100)
MONOCYTES ABSOLUTE: 0.6 K/CU MM
MONOCYTES RELATIVE PERCENT: 6.6 % (ref 0–4)
NUCLEATED RBC %: 0 %
PDW BLD-RTO: 14.9 % (ref 11.7–14.9)
PLATELET # BLD: 271 K/CU MM (ref 140–440)
PMV BLD AUTO: 9.6 FL (ref 7.5–11.1)
POTASSIUM SERPL-SCNC: 5 MMOL/L (ref 3.5–5.1)
PROCALCITONIN: 0.16
RBC # BLD: 3.01 M/CU MM (ref 4.6–6.2)
SARS-COV-2: DETECTED
SEGMENTED NEUTROPHILS ABSOLUTE COUNT: 7.3 K/CU MM
SEGMENTED NEUTROPHILS RELATIVE PERCENT: 85.8 % (ref 36–66)
SODIUM BLD-SCNC: 141 MMOL/L (ref 135–145)
SOURCE: ABNORMAL
TOTAL IMMATURE NEUTOROPHIL: 0.04 K/CU MM
TOTAL NUCLEATED RBC: 0 K/CU MM
WBC # BLD: 8.5 K/CU MM (ref 4–10.5)

## 2020-09-10 PROCEDURE — 6370000000 HC RX 637 (ALT 250 FOR IP): Performed by: INTERNAL MEDICINE

## 2020-09-10 PROCEDURE — 6360000002 HC RX W HCPCS: Performed by: NURSE PRACTITIONER

## 2020-09-10 PROCEDURE — 44188 LAP COLOSTOMY: CPT | Performed by: SURGERY

## 2020-09-10 PROCEDURE — 2709999900 HC NON-CHARGEABLE SUPPLY: Performed by: SURGERY

## 2020-09-10 PROCEDURE — 86140 C-REACTIVE PROTEIN: CPT

## 2020-09-10 PROCEDURE — 2700000000 HC OXYGEN THERAPY PER DAY

## 2020-09-10 PROCEDURE — 0D1N4Z4 BYPASS SIGMOID COLON TO CUTANEOUS, PERCUTANEOUS ENDOSCOPIC APPROACH: ICD-10-PCS | Performed by: SURGERY

## 2020-09-10 PROCEDURE — 2500000003 HC RX 250 WO HCPCS: Performed by: NURSE ANESTHETIST, CERTIFIED REGISTERED

## 2020-09-10 PROCEDURE — 84145 PROCALCITONIN (PCT): CPT

## 2020-09-10 PROCEDURE — 3700000001 HC ADD 15 MINUTES (ANESTHESIA): Performed by: SURGERY

## 2020-09-10 PROCEDURE — 94150 VITAL CAPACITY TEST: CPT

## 2020-09-10 PROCEDURE — 3700000000 HC ANESTHESIA ATTENDED CARE: Performed by: SURGERY

## 2020-09-10 PROCEDURE — 36415 COLL VENOUS BLD VENIPUNCTURE: CPT

## 2020-09-10 PROCEDURE — 2580000003 HC RX 258: Performed by: NURSE ANESTHETIST, CERTIFIED REGISTERED

## 2020-09-10 PROCEDURE — 3600000004 HC SURGERY LEVEL 4 BASE: Performed by: SURGERY

## 2020-09-10 PROCEDURE — 99211 OFF/OP EST MAY X REQ PHY/QHP: CPT

## 2020-09-10 PROCEDURE — 6370000000 HC RX 637 (ALT 250 FOR IP)

## 2020-09-10 PROCEDURE — 6360000002 HC RX W HCPCS: Performed by: NURSE ANESTHETIST, CERTIFIED REGISTERED

## 2020-09-10 PROCEDURE — 2500000003 HC RX 250 WO HCPCS: Performed by: SURGERY

## 2020-09-10 PROCEDURE — 7100000000 HC PACU RECOVERY - FIRST 15 MIN: Performed by: SURGERY

## 2020-09-10 PROCEDURE — 3600000014 HC SURGERY LEVEL 4 ADDTL 15MIN: Performed by: SURGERY

## 2020-09-10 PROCEDURE — 7100000001 HC PACU RECOVERY - ADDTL 15 MIN: Performed by: SURGERY

## 2020-09-10 PROCEDURE — 1200000000 HC SEMI PRIVATE

## 2020-09-10 PROCEDURE — 94761 N-INVAS EAR/PLS OXIMETRY MLT: CPT

## 2020-09-10 PROCEDURE — 2720000010 HC SURG SUPPLY STERILE: Performed by: SURGERY

## 2020-09-10 PROCEDURE — 80048 BASIC METABOLIC PNL TOTAL CA: CPT

## 2020-09-10 PROCEDURE — 85025 COMPLETE CBC W/AUTO DIFF WBC: CPT

## 2020-09-10 RX ORDER — VECURONIUM BROMIDE 1 MG/ML
INJECTION, POWDER, LYOPHILIZED, FOR SOLUTION INTRAVENOUS PRN
Status: DISCONTINUED | OUTPATIENT
Start: 2020-09-10 | End: 2020-09-10 | Stop reason: SDUPTHER

## 2020-09-10 RX ORDER — MORPHINE SULFATE 2 MG/ML
2 INJECTION, SOLUTION INTRAMUSCULAR; INTRAVENOUS ONCE
Status: COMPLETED | OUTPATIENT
Start: 2020-09-10 | End: 2020-09-10

## 2020-09-10 RX ORDER — DEXAMETHASONE SODIUM PHOSPHATE 4 MG/ML
INJECTION, SOLUTION INTRA-ARTICULAR; INTRALESIONAL; INTRAMUSCULAR; INTRAVENOUS; SOFT TISSUE PRN
Status: DISCONTINUED | OUTPATIENT
Start: 2020-09-10 | End: 2020-09-10 | Stop reason: SDUPTHER

## 2020-09-10 RX ORDER — CEFTRIAXONE 1 G/1
INJECTION, POWDER, FOR SOLUTION INTRAMUSCULAR; INTRAVENOUS PRN
Status: DISCONTINUED | OUTPATIENT
Start: 2020-09-10 | End: 2020-09-10 | Stop reason: SDUPTHER

## 2020-09-10 RX ORDER — LIDOCAINE HYDROCHLORIDE 20 MG/ML
INJECTION, SOLUTION INTRAVENOUS PRN
Status: DISCONTINUED | OUTPATIENT
Start: 2020-09-10 | End: 2020-09-10 | Stop reason: SDUPTHER

## 2020-09-10 RX ORDER — ROCURONIUM BROMIDE 10 MG/ML
INJECTION, SOLUTION INTRAVENOUS PRN
Status: DISCONTINUED | OUTPATIENT
Start: 2020-09-10 | End: 2020-09-10 | Stop reason: SDUPTHER

## 2020-09-10 RX ORDER — 0.9 % SODIUM CHLORIDE 0.9 %
500 INTRAVENOUS SOLUTION INTRAVENOUS
Status: DISCONTINUED | OUTPATIENT
Start: 2020-09-10 | End: 2020-09-10 | Stop reason: HOSPADM

## 2020-09-10 RX ORDER — ONDANSETRON 2 MG/ML
4 INJECTION INTRAMUSCULAR; INTRAVENOUS
Status: DISCONTINUED | OUTPATIENT
Start: 2020-09-10 | End: 2020-09-10 | Stop reason: HOSPADM

## 2020-09-10 RX ORDER — BUPIVACAINE HYDROCHLORIDE 5 MG/ML
INJECTION, SOLUTION EPIDURAL; INTRACAUDAL
Status: COMPLETED | OUTPATIENT
Start: 2020-09-10 | End: 2020-09-10

## 2020-09-10 RX ORDER — FENTANYL CITRATE 50 UG/ML
50 INJECTION, SOLUTION INTRAMUSCULAR; INTRAVENOUS EVERY 5 MIN PRN
Status: DISCONTINUED | OUTPATIENT
Start: 2020-09-10 | End: 2020-09-10 | Stop reason: HOSPADM

## 2020-09-10 RX ORDER — PROPOFOL 10 MG/ML
INJECTION, EMULSION INTRAVENOUS PRN
Status: DISCONTINUED | OUTPATIENT
Start: 2020-09-10 | End: 2020-09-10 | Stop reason: SDUPTHER

## 2020-09-10 RX ORDER — OXYCODONE HYDROCHLORIDE 5 MG/1
5 TABLET ORAL EVERY 6 HOURS PRN
Status: DISCONTINUED | OUTPATIENT
Start: 2020-09-10 | End: 2020-09-19 | Stop reason: HOSPADM

## 2020-09-10 RX ORDER — FENTANYL CITRATE 50 UG/ML
INJECTION, SOLUTION INTRAMUSCULAR; INTRAVENOUS PRN
Status: DISCONTINUED | OUTPATIENT
Start: 2020-09-10 | End: 2020-09-10 | Stop reason: SDUPTHER

## 2020-09-10 RX ORDER — PROMETHAZINE HYDROCHLORIDE 25 MG/ML
6.25 INJECTION, SOLUTION INTRAMUSCULAR; INTRAVENOUS
Status: DISCONTINUED | OUTPATIENT
Start: 2020-09-10 | End: 2020-09-10 | Stop reason: HOSPADM

## 2020-09-10 RX ORDER — LABETALOL HYDROCHLORIDE 5 MG/ML
5 INJECTION, SOLUTION INTRAVENOUS EVERY 10 MIN PRN
Status: DISCONTINUED | OUTPATIENT
Start: 2020-09-10 | End: 2020-09-10 | Stop reason: HOSPADM

## 2020-09-10 RX ORDER — MEPERIDINE HYDROCHLORIDE 25 MG/ML
12.5 INJECTION INTRAMUSCULAR; INTRAVENOUS; SUBCUTANEOUS EVERY 5 MIN PRN
Status: DISCONTINUED | OUTPATIENT
Start: 2020-09-10 | End: 2020-09-10 | Stop reason: HOSPADM

## 2020-09-10 RX ORDER — HYDROMORPHONE HCL 110MG/55ML
0.5 PATIENT CONTROLLED ANALGESIA SYRINGE INTRAVENOUS EVERY 5 MIN PRN
Status: DISCONTINUED | OUTPATIENT
Start: 2020-09-10 | End: 2020-09-10 | Stop reason: HOSPADM

## 2020-09-10 RX ORDER — KETAMINE HYDROCHLORIDE 10 MG/ML
INJECTION, SOLUTION INTRAMUSCULAR; INTRAVENOUS PRN
Status: DISCONTINUED | OUTPATIENT
Start: 2020-09-10 | End: 2020-09-10 | Stop reason: SDUPTHER

## 2020-09-10 RX ORDER — DIPHENHYDRAMINE HYDROCHLORIDE 50 MG/ML
12.5 INJECTION INTRAMUSCULAR; INTRAVENOUS
Status: DISCONTINUED | OUTPATIENT
Start: 2020-09-10 | End: 2020-09-10 | Stop reason: HOSPADM

## 2020-09-10 RX ORDER — SODIUM CHLORIDE, SODIUM LACTATE, POTASSIUM CHLORIDE, CALCIUM CHLORIDE 600; 310; 30; 20 MG/100ML; MG/100ML; MG/100ML; MG/100ML
INJECTION, SOLUTION INTRAVENOUS CONTINUOUS PRN
Status: DISCONTINUED | OUTPATIENT
Start: 2020-09-10 | End: 2020-09-10 | Stop reason: SDUPTHER

## 2020-09-10 RX ORDER — ONDANSETRON 2 MG/ML
INJECTION INTRAMUSCULAR; INTRAVENOUS PRN
Status: DISCONTINUED | OUTPATIENT
Start: 2020-09-10 | End: 2020-09-10 | Stop reason: SDUPTHER

## 2020-09-10 RX ADMIN — LIDOCAINE HYDROCHLORIDE 100 MG: 20 INJECTION, SOLUTION INTRAVENOUS at 12:20

## 2020-09-10 RX ADMIN — CEFTRIAXONE 1 G: 1 INJECTION, POWDER, FOR SOLUTION INTRAMUSCULAR; INTRAVENOUS at 13:12

## 2020-09-10 RX ADMIN — KETAMINE HYDROCHLORIDE 20 MG: 10 INJECTION INTRAMUSCULAR; INTRAVENOUS at 12:47

## 2020-09-10 RX ADMIN — MIRTAZAPINE 45 MG: 15 TABLET, FILM COATED ORAL at 21:19

## 2020-09-10 RX ADMIN — FERROUS SULFATE TAB 325 MG (65 MG ELEMENTAL FE) 325 MG: 325 (65 FE) TAB at 08:30

## 2020-09-10 RX ADMIN — VECURONIUM BROMIDE FOR INJECTION 2 MG: 1 INJECTION, POWDER, LYOPHILIZED, FOR SOLUTION INTRAVENOUS at 12:42

## 2020-09-10 RX ADMIN — METOPROLOL SUCCINATE 25 MG: 25 TABLET, EXTENDED RELEASE ORAL at 08:30

## 2020-09-10 RX ADMIN — KETAMINE HYDROCHLORIDE 40 MG: 10 INJECTION INTRAMUSCULAR; INTRAVENOUS at 12:20

## 2020-09-10 RX ADMIN — PROPOFOL 50 MG: 10 INJECTION, EMULSION INTRAVENOUS at 12:20

## 2020-09-10 RX ADMIN — QUETIAPINE FUMARATE 100 MG: 100 TABLET ORAL at 08:30

## 2020-09-10 RX ADMIN — FENTANYL CITRATE 25 MCG: 50 INJECTION INTRAMUSCULAR; INTRAVENOUS at 13:06

## 2020-09-10 RX ADMIN — QUETIAPINE FUMARATE 100 MG: 100 TABLET ORAL at 21:19

## 2020-09-10 RX ADMIN — ROCURONIUM BROMIDE 50 MG: 10 INJECTION INTRAVENOUS at 12:20

## 2020-09-10 RX ADMIN — METRONIDAZOLE 500 MG: 500 INJECTION, SOLUTION INTRAVENOUS at 12:52

## 2020-09-10 RX ADMIN — TRAMADOL HYDROCHLORIDE 50 MG: 50 TABLET, FILM COATED ORAL at 01:12

## 2020-09-10 RX ADMIN — ONDANSETRON 4 MG: 2 INJECTION INTRAMUSCULAR; INTRAVENOUS at 12:20

## 2020-09-10 RX ADMIN — SUGAMMADEX 200 MG: 100 INJECTION, SOLUTION INTRAVENOUS at 13:35

## 2020-09-10 RX ADMIN — FENTANYL CITRATE 25 MCG: 50 INJECTION INTRAMUSCULAR; INTRAVENOUS at 12:20

## 2020-09-10 RX ADMIN — PANTOPRAZOLE SODIUM 40 MG: 40 TABLET, DELAYED RELEASE ORAL at 06:48

## 2020-09-10 RX ADMIN — FENTANYL CITRATE 25 MCG: 50 INJECTION INTRAMUSCULAR; INTRAVENOUS at 12:56

## 2020-09-10 RX ADMIN — SODIUM CHLORIDE, POTASSIUM CHLORIDE, SODIUM LACTATE AND CALCIUM CHLORIDE: 600; 310; 30; 20 INJECTION, SOLUTION INTRAVENOUS at 12:06

## 2020-09-10 RX ADMIN — MORPHINE SULFATE 2 MG: 2 INJECTION, SOLUTION INTRAMUSCULAR; INTRAVENOUS at 06:48

## 2020-09-10 RX ADMIN — FENTANYL CITRATE 25 MCG: 50 INJECTION INTRAMUSCULAR; INTRAVENOUS at 12:41

## 2020-09-10 RX ADMIN — DEXAMETHASONE SODIUM PHOSPHATE 8 MG: 4 INJECTION, SOLUTION INTRAMUSCULAR; INTRAVENOUS at 12:20

## 2020-09-10 ASSESSMENT — PULMONARY FUNCTION TESTS
PIF_VALUE: 17
PIF_VALUE: 16
PIF_VALUE: 17
PIF_VALUE: 1
PIF_VALUE: 17
PIF_VALUE: 18
PIF_VALUE: 31
PIF_VALUE: 1
PIF_VALUE: 17
PIF_VALUE: 31
PIF_VALUE: 16
PIF_VALUE: 17
PIF_VALUE: 31
PIF_VALUE: 20
PIF_VALUE: 0
PIF_VALUE: 17
PIF_VALUE: 31
PIF_VALUE: 1
PIF_VALUE: 1
PIF_VALUE: 17
PIF_VALUE: 31
PIF_VALUE: 2
PIF_VALUE: 30
PIF_VALUE: 17
PIF_VALUE: 16
PIF_VALUE: 3
PIF_VALUE: 17
PIF_VALUE: 1
PIF_VALUE: 19
PIF_VALUE: 17
PIF_VALUE: 17
PIF_VALUE: 18
PIF_VALUE: 11
PIF_VALUE: 17
PIF_VALUE: 14
PIF_VALUE: 19
PIF_VALUE: 1
PIF_VALUE: 31
PIF_VALUE: 0
PIF_VALUE: 17
PIF_VALUE: 18
PIF_VALUE: 2
PIF_VALUE: 19
PIF_VALUE: 17
PIF_VALUE: 18
PIF_VALUE: 17
PIF_VALUE: 18
PIF_VALUE: 17
PIF_VALUE: 31
PIF_VALUE: 18
PIF_VALUE: 31
PIF_VALUE: 19
PIF_VALUE: 32
PIF_VALUE: 31
PIF_VALUE: 31
PIF_VALUE: 10
PIF_VALUE: 17
PIF_VALUE: 1
PIF_VALUE: 19
PIF_VALUE: 27
PIF_VALUE: 31
PIF_VALUE: 17
PIF_VALUE: 31
PIF_VALUE: 16
PIF_VALUE: 14
PIF_VALUE: 17
PIF_VALUE: 17
PIF_VALUE: 10
PIF_VALUE: 2
PIF_VALUE: 31
PIF_VALUE: 28
PIF_VALUE: 22
PIF_VALUE: 3
PIF_VALUE: 26
PIF_VALUE: 17
PIF_VALUE: 19
PIF_VALUE: 10
PIF_VALUE: 17
PIF_VALUE: 31
PIF_VALUE: 32
PIF_VALUE: 18
PIF_VALUE: 30
PIF_VALUE: 31
PIF_VALUE: 17
PIF_VALUE: 1
PIF_VALUE: 32
PIF_VALUE: 17
PIF_VALUE: 17
PIF_VALUE: 1
PIF_VALUE: 30
PIF_VALUE: 1
PIF_VALUE: 19
PIF_VALUE: 18
PIF_VALUE: 17
PIF_VALUE: 17
PIF_VALUE: 1
PIF_VALUE: 17
PIF_VALUE: 0
PIF_VALUE: 15
PIF_VALUE: 18
PIF_VALUE: 16

## 2020-09-10 ASSESSMENT — ENCOUNTER SYMPTOMS
GASTROINTESTINAL NEGATIVE: 1
RESPIRATORY NEGATIVE: 1

## 2020-09-10 ASSESSMENT — PAIN SCALES - WONG BAKER
WONGBAKER_NUMERICALRESPONSE: 4
WONGBAKER_NUMERICALRESPONSE: 4

## 2020-09-10 NOTE — PROGRESS NOTES
1355: Patient recovered in OR room 5 due to covid positive status. Patient remains hooked up to all monitors. VS stable. Surgical dressings to abdomen are clean, dry and intact x3, colostomy bag intact. Report obtained from [address] and [place] CRNA. 1410: Patient seeming anxious, moaning out louder than usual. Patient medicated. 1420: Patient calm and cooperative at this time. Moaning subsided. 1425: Report called to RN for room 2018.   1434: Patient transferred to step down unit with all covid precautions in place. Charge nurse  Roby Tinajero RN at door to accept patient to covid floor.

## 2020-09-10 NOTE — PROGRESS NOTES
Infectious Disease Progress Note  9/10/2020   Patient Name: Celina Azar : 1932     Off the floor for procedure  Reason for visit: F/u COVID-19, stage IV sacral decubitus ulcer ? History:? Interval history noted  Dementia, denies any complaints. Continues to have temperatures in the range of 99.9,  Physical Exam:  Vital Signs: BP (!) 117/98   Pulse 85   Temp 98.7 °F (37.1 °C) (Oral)   Resp 26   Ht 5' 7.99\" (1.727 m)   Wt 179 lb 14.3 oz (81.6 kg)   SpO2 94%   BMI 27.36 kg/m²     Gen: alert and oriented, memory deficits  Skin: no stigmata of endocarditis  Wounds: Stage IV sacral decubitus ulcer, with areas of necrosis   HEMT: AT/NC Oropharynx pink, moist, and without lesions or exudates; dentition in good state of repair  Eyes: PERRLA, EOMI, conjunctiva pink, sclera anicteric. Neck: Supple. Trachea midline. No LAD. Chest: no distress and CTA. Good air movement. Heart: RRR and no MRG. Abd: soft, non-distended, no tenderness, no hepatomegaly. Normoactive bowel sounds. Ext: no clubbing, cyanosis, or edema  Catheter Site: without erythema or tenderness  Neuro: Mental status intact. CN 2-12 intact and no focal sensory or motor deficits     Radiologic / Imaging / TESTING  No results found. Labs:    No results found for this or any previous visit (from the past 24 hour(s)).   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 *      Impression and plan   Clinical status: stable   Therapeutic:   Diagnostic: Repeat CRP, trend procalcitonin   F/u: 9/1-blood cx ngtd    Other:  Summary: Continues to have temperatures in the range of 99.9 °F, concerned that there might be ongoing viral replication. Repeat SARS-CoV-2 test positive on 8/21 and 8/29. SARS-CoV-2 PCR testing remains repeatedly positive. 9/9/2020 was positive. Not unusual in elderly men.     Electronically signed by: Electronically signed by Sally Faria MD on 9/10/2020 at 11:34 AM
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
## 2020-09-10 NOTE — H&P
General Surgery - H&P  MD Aisha Lennon Dr, PA-C  General Surgery       Subjective:     Patient is a 80 y.o.  male scheduled for laparoscopic diverting colostomy for decubitus wounds. Consent was obtained from daughter. He is confused this morning. Denies pain. HE is s/p wound debridement on 8/23 with Dr. Rosalva Vicente.        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 ([address]     Acute respiratory failure ([address] 08/21/2020    Elevated liver enzymes 08/09/2020    SOB (shortness of breath) 89/77/5468    Metabolic encephalopathy 21/30/5434    Acute renal failure ([address] 12/03/2014    Pneumonia due to organism 12/02/2014    ARF (acute respiratory failure) ([address] 12/02/2014    Essential hypertension, benign 12/02/2014    Depressive disorder, not elsewhere classified 12/02/2014     Past Medical History:   Diagnosis Date    Anxiety     Depression     Hypertension     Nerves       Past Surgical History:   Procedure Laterality Date    JOINT REPLACEMENT      right hip    UPPER GASTROINTESTINAL ENDOSCOPY N/A 8/12/2020    EGD BIOPSY performed by Jamila Rose MD at [facility] ENDOSCOPY      Current Facility-Administered Medications   Medication Dose Route Frequency Provider Last Rate Last Dose    [Held by provider] enoxaparin (LOVENOX) injection 30 mg  30 mg Subcutaneous BID Maya Shahid MD   Stopped at 09/08/20 1007    collagenase ointment   Topical Daily Domo Vance MD        lidocaine PF 1 % injection 5 mL  5 mL Intradermal Once Tracey Miller PA-C        sodium chloride flush 0.9 % injection 10 mL  10 mL Intravenous 2 times per day Tracey Miller PA-C   10 mL at 09/09/20 2029    sodium chloride flush 0.9 % injection 10 mL

## 2020-09-10 NOTE — PROGRESS NOTES
Feeds   Code Status Limited   Disposition  patient requires Dilantin colostomy   MDM [] Low, [] Moderate,[]  High  Patient's risk as above due to      History of Present Illness:     Chief Complaint: <principal problem not specified>  Shanda Kumar is a 80 y.o.  male  who presents with shortness of breath       Ten point ROS reviewed negative, unless as noted above    Objective: Intake/Output Summary (Last 24 hours) at 9/10/2020 1427  Last data filed at 9/10/2020 1354  Gross per 24 hour   Intake --   Output 1345 ml   Net -1345 ml      Vitals:   Vitals:    09/10/20 0832   BP: (!) 117/98   Pulse: 85   Resp: 26   Temp: 98.7 °F (37.1 °C)   SpO2: 94%     Physical Exam:   GEN Awake male, sitting upright in bed in no apparent distress. Appears given age. EYES Pupils are equally round. No scleral erythema, discharge, or conjunctivitis. HENT Mucous membranes are moist. Oral pharynx without exudates, no evidence of thrush. NECK Supple, no apparent thyromegaly or masses. RESP Clear to auscultation, no wheezes, rales or rhonchi. Symmetric chest movement while on room air. CARDIO/VASC S1/S2 auscultated. Regular rate without appreciable murmurs, rubs, or gallops. No JVD or carotid bruits. Peripheral pulses equal bilaterally and palpable. No peripheral edema. GI Abdomen is soft without significant tenderness, masses, or guarding. Bowel sounds are normoactive. Rectal exam deferred.  No costovertebral angle tenderness. Normal appearing external genitalia. Greene catheter is not present. HEME/LYMPH No palpable cervical lymphadenopathy and no hepatosplenomegaly. No petechiae or ecchymoses. MSK No gross joint deformities. SKIN Normal coloration, warm, dry. NEURO Cranial nerves appear grossly intact, normal speech, no lateralizing weakness. PSYCH Awake, alert, oriented x self. Affect appropriate.     Medications:   Medications:    cefTRIAXone (ROCEPHIN) IV  1 g Intravenous Once    metroNIDAZOLE  500 mg Intravenous Once    [Held by provider] enoxaparin  30 mg Subcutaneous BID    collagenase   Topical Daily    lidocaine PF  5 mL Intradermal Once    sodium chloride flush  10 mL Intravenous 2 times per day    collagenase   Topical BID    ferrous sulfate  325 mg Oral BID WC    sodium chloride flush  10 mL Intravenous 2 times per day    metoprolol succinate  25 mg Oral Daily    mirtazapine  45 mg Oral Nightly    QUEtiapine  100 mg Oral BID    pantoprazole  40 mg Oral BID AC      Infusions:   PRN Meds: fentanNYL, 50 mcg, Q5 Min PRN  HYDROmorphone, 0.5 mg, Q5 Min PRN  diphenhydrAMINE, 12.5 mg, Once PRN  sodium chloride, 500 mL, Once PRN  promethazine, 6.25 mg, Once PRN  ondansetron, 4 mg, Once PRN  labetalol, 5 mg, Q10 Min PRN  meperidine, 12.5 mg, Q5 Min PRN  sodium chloride flush, 10 mL, PRN  traMADol, 50 mg, Q6H PRN  sodium chloride flush, 10 mL, PRN  acetaminophen, 650 mg, Q6H PRN    Or  acetaminophen, 650 mg, Q6H PRN  polyethylene glycol, 17 g, Daily PRN  promethazine, 12.5 mg, Q6H PRN    Or  ondansetron, 4 mg, Q6H PRN          Electronically signed by Audrey Garay MD on 9/10/2020 at 2:27 PM

## 2020-09-10 NOTE — ANESTHESIA PRE PROCEDURE
Department of Anesthesiology  Preprocedure Note       Name:  Jerald Barakat   Age:  80 y.o.  :  1932                                          MRN:  [id number]         Date:  9/10/2020      Surgeon: Mia Nelson):  Bimal Vásquez MD    Procedure: Procedure(s):  EGD ESOPHAGOGASTRODUODENOSCOPY    Medications prior to admission:   Prior to Admission medications    Medication Sig Start Date End Date Taking? Authorizing Provider   pantoprazole (PROTONIX) 40 MG tablet Take 1 tablet by mouth 2 times daily (before meals) 20   Darlene Crowe MD   furosemide (LASIX) 40 MG tablet Take 1 tablet by mouth daily for 3 days 8/7/20 8/10/20  Jaguar Kerns MD   cloNIDine (CATAPRES) 0.1 MG tablet Take 1 tablet by mouth every 6 hours as needed (for B/P systolic > 459). 12/15/14   Sergio Mosley MD   QUEtiapine (SEROQUEL) 100 MG tablet Take 100 mg by mouth 2 times daily. Historical Provider, MD   metoprolol (TOPROL-XL) 25 MG XL tablet Take 25 mg by mouth daily. Historical Provider, MD   mirtazapine (REMERON) 45 MG tablet Take 45 mg by mouth nightly.       Historical Provider, MD       Current medications:    Current Facility-Administered Medications   Medication Dose Route Frequency Provider Last Rate Last Dose    cefTRIAXone (ROCEPHIN) 1 g in dextrose 5 % 50 mL IVPB  1 g Intravenous Once Glenys Luciano MD        metronidazole (FLAGYL) 500 mg in NaCl 100 mL IVPB premix  500 mg Intravenous Once Glenys Luciano MD        [Held by provider] enoxaparin (LOVENOX) injection 30 mg  30 mg Subcutaneous BID Reece Kim MD   Stopped at 20 1007    collagenase ointment   Topical Daily Domo Vance MD        lidocaine PF 1 % injection 5 mL  5 mL Intradermal Once Tracey Miller PA-C        sodium chloride flush 0.9 % injection 10 mL  10 mL Intravenous 2 times per day Tracey Miller PA-C   10 mL at 20    sodium chloride flush 0.9 % injection 10 mL  10 mL Intravenous PRN Adelaida Beatty PA-C        traMADol (ULTRAM) tablet 50 mg  50 mg Oral Q6H PRN Amarilis Oliver MD   50 mg at 09/10/20 0112    collagenase ointment   Topical BID Tavo Saldivar MD        ferrous sulfate (IRON 325) tablet 325 mg  325 mg Oral BID WC Tavo Saldivar MD   325 mg at 09/10/20 0830    sodium chloride flush 0.9 % injection 10 mL  10 mL Intravenous 2 times per day Windy Hernandez MD   10 mL at 09/09/20 0926    sodium chloride flush 0.9 % injection 10 mL  10 mL Intravenous PRN Windy Hernandez MD        acetaminophen (TYLENOL) tablet 650 mg  650 mg Oral Q6H PRN Windy Hernandez MD   650 mg at 09/03/20 1725    Or    acetaminophen (TYLENOL) suppository 650 mg  650 mg Rectal Q6H PRN Windy Hernandez MD        polyethylene glycol (GLYCOLAX) packet 17 g  17 g Oral Daily PRN Windy Hernandez MD        promethazine (PHENERGAN) tablet 12.5 mg  12.5 mg Oral Q6H PRN Windy Hernandez MD        Or    ondansetron (ZOFRAN) injection 4 mg  4 mg Intravenous Q6H PRN Windy Hernandez MD        metoprolol succinate (TOPROL XL) extended release tablet 25 mg  25 mg Oral Daily Windy Hernandez MD   25 mg at 09/10/20 0830    mirtazapine (REMERON) tablet 45 mg  45 mg Oral Nightly Windy Hernandez MD   45 mg at 09/09/20 2029    QUEtiapine (SEROQUEL) tablet 100 mg  100 mg Oral BID Windy Hernandez MD   100 mg at 09/10/20 0830    pantoprazole (PROTONIX) tablet 40 mg  40 mg Oral BID AC Windy Hernandez MD   40 mg at 09/10/20 2760     Facility-Administered Medications Ordered in Other Encounters   Medication Dose Route Frequency Provider Last Rate Last Dose    propofol injection    PRN RAYUMNDO Hernandez CRNA   50 mg at 09/10/20 1220    lidocaine (cardiac) (XYLOCAINE) injection    PRN RAYMUNDO Hernandez CRNA   100 mg at 09/10/20 1220    fentaNYL (SUBLIMAZE) injection    PRN RAYMUNDO Hernandez CRNA   25 mcg at 09/10/20 1220    ondansetron (ZOFRAN) injection    PRN RAYMUNDO Hernandez - CRNA   4 mg at 09/10/20 1220    dexamethasone (DECADRON) injection PRN Eryn Fanti, APRN - CRNA   8 mg at 09/10/20 1220    rocuronium (ZEMURON) injection    PRN Eryn Fanti, APRN - CRNA   50 mg at 09/10/20 1220    ketamine (KETALAR) injection    PRN Eryn Fanti, APRN - CRNA   40 mg at 09/10/20 1220    vecuronium (NORCURON) injection    PRN Eryn Fanti, APRN - CRNA   2 mg at 09/10/20 1242    metronidazole (FLAGYL) 500 mg in NaCl 100 mL IVPB premix   Intravenous PRN Eryn Fanti, APRN - CRNA   500 mg at 09/10/20 1252       Allergies:  No Known Allergies    Problem List:    Patient Active Problem List   Diagnosis Code    Pneumonia due to organism J18.9    ARF (acute respiratory failure) ([address] J96.00    Essential hypertension, benign I10    Depressive disorder, not elsewhere classified F32.9    Acute renal failure ([address] I19.9    Metabolic encephalopathy Z56.56    SOB (shortness of breath) R06.02    Elevated liver enzymes R74.8    Acute respiratory failure (HCC) J96.00    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 ([address] L89.154    Pneumonia due to COVID-19 virus U07.1, J12.89    Osteomyelitis of sacrum (HCC) M46.28    Moderate malnutrition ([address] E44.0       Past Medical History:        Diagnosis Date    Anxiety     Depression     Hypertension     Nerves        Past Surgical History:        Procedure Laterality Date    JOINT REPLACEMENT      right hip    UPPER GASTROINTESTINAL ENDOSCOPY N/A 2020    EGD [facility]SY performed [facility] Lawson Ceron MD at [facility] ENDOSCOPY       Social History:    Social History     Tobacco Use    Smoking status: Former Smoker     Last attempt to quit: 1974     Years since quittin.8    Smokeless tobacco: Current User     Types: Chew   Substance Use Topics    Alcohol use: No     Comment: used to drimk on weekends stopped 2 years ago                                Ready to quit: Not Answered  Counseling given: Not Answered      Vital Signs (Current):   Vitals:    09/09/20 2000 09/10/20 0722 09/10/20 0830 09/10/20 0832   BP: (!) 119/91   (!) 117/98   Pulse: 78  85 85   Resp: 20   26   Temp: 37.3 °C (99.1 °F)   37.1 °C (98.7 °F)   TempSrc: Oral   Oral   SpO2: 93% 93%  94%   Weight:       Height:                                                  BP Readings from Last 3 Encounters:   09/10/20 (!) 117/98   09/10/20 118/77   08/13/20 105/65       NPO Status:                                               12 hrs. BMI:   Wt Readings from Last 3 Encounters:   09/08/20 179 lb 14.3 oz (81.6 kg)   08/13/20 196 lb (88.9 kg)     Body mass index is 27.36 kg/m². CBC:   Lab Results   Component Value Date    WBC 5.7 09/09/2020    RBC 2.82 09/09/2020    HGB 7.4 09/09/2020    HCT 25.4 09/09/2020    MCV 90.1 09/09/2020    RDW 14.8 09/09/2020     09/09/2020       CMP:   Lab Results   Component Value Date     09/09/2020    K 4.6 09/09/2020     09/09/2020    CO2 28 09/09/2020    BUN 36 09/09/2020    CREATININE 1.4 09/09/2020    GFRAA 58 09/09/2020    LABGLOM 48 09/09/2020    GLUCOSE 100 09/09/2020    PROT 5.4 08/22/2020    PROT 6.4 10/24/2012    CALCIUM 8.2 09/09/2020    BILITOT 0.5 08/22/2020    ALKPHOS 241 08/22/2020    AST 88 08/22/2020     08/22/2020       POC Tests: No results for input(s): POCGLU, POCNA, POCK, POCCL, POCBUN, POCHEMO, POCHCT in the last 72 hours.     Coags:   Lab Results   Component Value Date    PROTIME 13.9 08/04/2020    INR 1.15 08/04/2020    APTT 31.7 08/04/2020       HCG (If Applicable): No results found for: PREGTESTUR, PREGSERUM, HCG, HCGQUANT     ABGs:   Lab Results   Component Value Date    PO2ART 104 12/05/2014    EQP3ZRB 38.0 12/05/2014    HZB6EAX 21.0 12/05/2014        Type & Screen (If Applicable):  No results found for: LABABO, [address]    Drug/Infectious Status (If Applicable):  Lab Results   Component Value Date    HEPCAB NON REACTIVE 08/05/2020       COVID-19 Screening (If Applicable):   Lab Results   Component Value Date    COVID19 DETECTED 09/09/2020 [address] DETECTED 09/01/2020            Anesthesia Plan      general     ASA 3       Induction: intravenous. Anesthetic plan and risks discussed with patient. RAYMUNDO Tobar CRNA   9/10/2020      Pre Anesthesia Evaluation complete. Anesthesia plan, risks, benefits, alternatives, and personnel discussed with patient and/or legal guardian. Patient and/or legal guardian verbalized an understanding and agreed to proceed. Anesthesia plan discussed with care team members and agreed upon.   RAYMUNDO Tobar CRNA  9/10/2020

## 2020-09-10 NOTE — ANESTHESIA POSTPROCEDURE EVALUATION
Department of Anesthesiology  Postprocedure Note    Patient: Sneha Briscoe  MRN: [id number]  YOB: 1932  Date of evaluation: 9/10/2020  Time:  2:45 PM     Procedure Summary     Date:  09/10/20 Room / Location:  [address] [place] [address]    Anesthesia Start:  1206 Anesthesia Stop:  6098    Procedure:  LAPAROSCOPY FOR DIVERTING COLOSTOMY (N/A ) Diagnosis:  (COLOSTOMY)    Surgeon:  Balwinder Renee MD Responsible Provider:  RAYMUNDO Johnston CRNA    Anesthesia Type:  MAC ASA Status:  3          Anesthesia Type: MAC    Lotus Phase I: Lotus Score: 7    Lotus Phase II:      Last vitals: Reviewed and per EMR flowsheets.        Anesthesia Post Evaluation    Patient location during evaluation: PACU  Patient participation: complete - patient participated  Level of consciousness: awake  Pain score: 3  Airway patency: patent  Nausea & Vomiting: no vomiting and no nausea  Complications: no  Cardiovascular status: blood pressure returned to baseline and hemodynamically stable  Respiratory status: acceptable  Hydration status: stable

## 2020-09-10 NOTE — CONSULTS
 mirtazapine (REMERON) 45 MG tablet Take 45 mg by mouth nightly.              Objective:      BP (!) 117/98   Pulse 85   Temp 98.7 °F (37.1 °C) (Oral)   Resp 26   Ht 5' 7.99\" (1.727 m)   Wt 179 lb 14.3 oz (81.6 kg)   SpO2 94%   BMI 27.36 kg/m²   Boone Risk Score: Boone Scale Score: 11    LABS    CBC:   Lab Results   Component Value Date    WBC 5.7 09/09/2020    RBC 2.82 09/09/2020    HGB 7.4 09/09/2020    HCT 25.4 09/09/2020    MCV 90.1 09/09/2020    MCH 26.2 09/09/2020    MCHC 29.1 09/09/2020    RDW 14.8 09/09/2020     09/09/2020    MPV 10.3 09/09/2020     CMP:    Lab Results   Component Value Date     09/09/2020    K 4.6 09/09/2020     09/09/2020    CO2 28 09/09/2020    BUN 36 09/09/2020    CREATININE 1.4 09/09/2020    GFRAA 58 09/09/2020    LABGLOM 48 09/09/2020    GLUCOSE 100 09/09/2020    PROT 5.4 08/22/2020    PROT 6.4 10/24/2012    LABALBU 2.6 08/22/2020    CALCIUM 8.2 09/09/2020    BILITOT 0.5 08/22/2020    ALKPHOS 241 08/22/2020    AST 88 08/22/2020     08/22/2020     Albumin:    Lab Results   Component Value Date    LABALBU 2.6 08/22/2020     PT/INR:    Lab Results   Component Value Date    PROTIME 13.9 08/04/2020    INR 1.15 08/04/2020     HgBA1c:    Lab Results   Component Value Date    LABA1C 5.3 06/05/2013         Assessment:     Patient Active Problem List   Diagnosis    Pneumonia due to organism    [facility]    Essential hypertension, benign    Depressive disorder, not elsewhere classified    Acute renal failure ([facility]    Metabolic encephalopathy    SOB (shortness of breath)    Elevated liver enzymes    Acute respiratory failure (HCC)    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 ([facility]    Pneumonia due to COVID-19 virus    Osteomyelitis of sacrum (HCC)    Moderate malnutrition (HCC)       Measurements:  Wound 08/10/20 Sacrum and buttock (Active)   Wound Pressure Stage  4 09/10/20 0916   Dressing Status Changed 09/10/20 0916 Dressing Changed Changed/New 09/10/20 0916   Dressing/Treatment Other (comment) 09/08/20 0824   Wound Cleansed Rinsed/Irrigated with saline 09/10/20 0916   Dressing Change Due 09/10/20 09/10/20 0835   Wound Length (cm) 10.5 cm 09/10/20 0916   Wound Width (cm) 7 cm 09/10/20 0916   Wound Depth (cm) 4.3 cm 09/10/20 0916   Wound Surface Area (cm^2) 73.5 cm^2 09/10/20 0916   Change in Wound Size % (l*w) 18.33 09/10/20 0916   Wound Volume (cm^3) 316.05 cm^3 09/10/20 0916   Wound Healing % -3412 09/10/20 0916   Distance Tunneling (cm) 0 cm 09/10/20 0916   Tunneling Position ___ O'Clock 0 09/10/20 0916   Undermining Starts ___ O'Clock 0 09/10/20 0916   Undermining Ends___ O'Clock 0 09/10/20 0916   Undermining Maxium Distance (cm) 0 09/10/20 0916   Wound Assessment Yellow; Red 09/10/20 0916   Drainage Amount Moderate 09/10/20 0916   Drainage Description Serosanguinous 09/10/20 0916   Odor None 09/10/20 0916   Margins Defined edges 09/10/20 0916   Exposed structure Bone 09/08/20 0824   Linda-wound Assessment Pink 09/10/20 0916   Non-staged Wound Description Full thickness 09/10/20 0916   Hamel%Wound Bed 30 09/08/20 0824   Red%Wound Bed 70 09/10/20 0916   Yellow%Wound Bed 30 09/10/20 0916   Black%Wound Bed 20 09/08/20 0824   Purple%Wound Bed 20 09/08/20 0824   Other%Wound Bed 20 09/03/20 0849   Number of days: 30       Response to treatment:  With complaints of pain. Pain Assessment:  Severity:  mild  Quality of pain: sharp  Wound Pain Timing/Severity: intermittent  Premedicated: no    Plan:     Plan of Care: Wound 08/10/20 Sacrum and buttock-Dressing/Treatment: (NS moist gauze, abd, skin prep to linda wound)  [REMOVED] Wound 08/21/20 Left lateral abdomen-Dressing/Treatment: Open to air     Pt in bed. Agreeable to wound reassessment. Possible surgery today for diverting colostomy per Dr. Sara Meyers. Heels pink, blanching, and intact. Heel protector boots applied. Wound to buttock and sacrum pictured and measured.  Santyl not available at this time. Reordered through pharmacy. Treatment as above. Wound with less slough. May benefit from NPWT soon. Pt positioned to right side with pillow support. Pt is at high risk for skin breakdown AEB Boone. Follow Boone orders. Specialty Bed Required : yes  [x] Low Air Loss   [x] Pressure Redistribution  [] Fluid Immersion  [] Bariatric  [] Total Pressure Relief  [] Other:     Discharge Plan:  Placement for patient upon discharge: tbd  Hospice Care: no  Patient appropriate for Outpatient [address]: yes-follow up with Dr. Keke Monzon    Patient/Caregiver Teaching:  Level of patient/caregiver understanding able to:   Needs reinforcement.         Electronically signed by Sierra Gray RN,  on 9/10/2020 at 11:28 AM

## 2020-09-11 LAB
BASOPHILS ABSOLUTE: 0 K/CU MM
BASOPHILS RELATIVE PERCENT: 0.3 % (ref 0–1)
DIFFERENTIAL TYPE: ABNORMAL
EOSINOPHILS ABSOLUTE: 0.1 K/CU MM
EOSINOPHILS RELATIVE PERCENT: 1.6 % (ref 0–3)
HCT VFR BLD CALC: 26.7 % (ref 42–52)
HEMOGLOBIN: 7.8 GM/DL (ref 13.5–18)
IMMATURE NEUTROPHIL %: 0.7 % (ref 0–0.43)
LYMPHOCYTES ABSOLUTE: 1.1 K/CU MM
LYMPHOCYTES RELATIVE PERCENT: 16.8 % (ref 24–44)
MCH RBC QN AUTO: 26.9 PG (ref 27–31)
MCHC RBC AUTO-ENTMCNC: 29.2 % (ref 32–36)
MCV RBC AUTO: 92.1 FL (ref 78–100)
MONOCYTES ABSOLUTE: 0.8 K/CU MM
MONOCYTES RELATIVE PERCENT: 11.7 % (ref 0–4)
NUCLEATED RBC %: 0 %
PDW BLD-RTO: 15 % (ref 11.7–14.9)
PLATELET # BLD: 285 K/CU MM (ref 140–440)
PMV BLD AUTO: 9.6 FL (ref 7.5–11.1)
RBC # BLD: 2.9 M/CU MM (ref 4.6–6.2)
SEGMENTED NEUTROPHILS ABSOLUTE COUNT: 4.7 K/CU MM
SEGMENTED NEUTROPHILS RELATIVE PERCENT: 68.9 % (ref 36–66)
TOTAL IMMATURE NEUTOROPHIL: 0.05 K/CU MM
TOTAL NUCLEATED RBC: 0 K/CU MM
WBC # BLD: 6.8 K/CU MM (ref 4–10.5)

## 2020-09-11 PROCEDURE — 94150 VITAL CAPACITY TEST: CPT

## 2020-09-11 PROCEDURE — 99232 SBSQ HOSP IP/OBS MODERATE 35: CPT | Performed by: INTERNAL MEDICINE

## 2020-09-11 PROCEDURE — 1200000000 HC SEMI PRIVATE

## 2020-09-11 PROCEDURE — 2580000003 HC RX 258

## 2020-09-11 PROCEDURE — 6370000000 HC RX 637 (ALT 250 FOR IP)

## 2020-09-11 PROCEDURE — 94761 N-INVAS EAR/PLS OXIMETRY MLT: CPT

## 2020-09-11 PROCEDURE — 2700000000 HC OXYGEN THERAPY PER DAY

## 2020-09-11 PROCEDURE — 92526 ORAL FUNCTION THERAPY: CPT

## 2020-09-11 PROCEDURE — 6360000002 HC RX W HCPCS: Performed by: HOSPITALIST

## 2020-09-11 PROCEDURE — 85025 COMPLETE CBC W/AUTO DIFF WBC: CPT

## 2020-09-11 RX ADMIN — PANTOPRAZOLE SODIUM 40 MG: 40 TABLET, DELAYED RELEASE ORAL at 05:09

## 2020-09-11 RX ADMIN — TRAMADOL HYDROCHLORIDE 50 MG: 50 TABLET, FILM COATED ORAL at 21:09

## 2020-09-11 RX ADMIN — PANTOPRAZOLE SODIUM 40 MG: 40 TABLET, DELAYED RELEASE ORAL at 18:35

## 2020-09-11 RX ADMIN — SODIUM CHLORIDE, PRESERVATIVE FREE 10 ML: 5 INJECTION INTRAVENOUS at 10:18

## 2020-09-11 RX ADMIN — SODIUM CHLORIDE, PRESERVATIVE FREE 10 ML: 5 INJECTION INTRAVENOUS at 10:17

## 2020-09-11 RX ADMIN — MIRTAZAPINE 45 MG: 15 TABLET, FILM COATED ORAL at 21:09

## 2020-09-11 RX ADMIN — COLLAGENASE SANTYL: 250 OINTMENT TOPICAL at 10:22

## 2020-09-11 RX ADMIN — QUETIAPINE FUMARATE 100 MG: 100 TABLET ORAL at 21:09

## 2020-09-11 RX ADMIN — SODIUM CHLORIDE, PRESERVATIVE FREE 10 ML: 5 INJECTION INTRAVENOUS at 21:11

## 2020-09-11 RX ADMIN — COLLAGENASE SANTYL: 250 OINTMENT TOPICAL at 05:03

## 2020-09-11 RX ADMIN — ENOXAPARIN SODIUM 30 MG: 30 INJECTION SUBCUTANEOUS at 21:10

## 2020-09-11 RX ADMIN — QUETIAPINE FUMARATE 100 MG: 100 TABLET ORAL at 10:17

## 2020-09-11 RX ADMIN — METOPROLOL SUCCINATE 25 MG: 25 TABLET, EXTENDED RELEASE ORAL at 10:17

## 2020-09-11 RX ADMIN — FERROUS SULFATE TAB 325 MG (65 MG ELEMENTAL FE) 325 MG: 325 (65 FE) TAB at 10:17

## 2020-09-11 RX ADMIN — FERROUS SULFATE TAB 325 MG (65 MG ELEMENTAL FE) 325 MG: 325 (65 FE) TAB at 18:35

## 2020-09-11 ASSESSMENT — PAIN SCALES - WONG BAKER
WONGBAKER_NUMERICALRESPONSE: 2
WONGBAKER_NUMERICALRESPONSE: 0
WONGBAKER_NUMERICALRESPONSE: 4
WONGBAKER_NUMERICALRESPONSE: 4

## 2020-09-11 ASSESSMENT — PAIN SCALES - GENERAL
PAINLEVEL_OUTOF10: 4
PAINLEVEL_OUTOF10: 1
PAINLEVEL_OUTOF10: 0
PAINLEVEL_OUTOF10: 0

## 2020-09-11 ASSESSMENT — PAIN DESCRIPTION - PAIN TYPE: TYPE: ACUTE PAIN

## 2020-09-11 ASSESSMENT — PAIN DESCRIPTION - LOCATION: LOCATION: GENERALIZED

## 2020-09-11 NOTE — PROGRESS NOTES
Prophylaxis [] Lovenox, []  Heparin, [] SCDs, [] Ambulation   GI Prophylaxis [] PPI,  [] H2 Blocker,  [] Carafate,  [] Diet/Tube Feeds   Code Status Limited   Disposition Patient pending placement   MDM [] Low, [] Moderate,[]  High  Patient's risk as above due to     History of Present Illness:     Chief Complaint: <principal problem not specified>  Colin Lemon is a 80 y.o.  male  who presents with fever and hypoxia       Ten point ROS reviewed negative, unless as noted above    Objective: Intake/Output Summary (Last 24 hours) at 9/11/2020 1323  Last data filed at 9/11/2020 0505  Gross per 24 hour   Intake 100 ml   Output 695 ml   Net -595 ml      Vitals:   Vitals:    09/11/20 1200   BP:    Pulse:    Resp: 19   Temp:    SpO2: 97%     Physical Exam:   GEN Awake male, sitting upright in bed in no apparent distress. Appears given age. EYES Pupils are equally round. No scleral erythema, discharge, or conjunctivitis. HENT Mucous membranes are moist. Oral pharynx without exudates, no evidence of thrush. NECK Supple, no apparent thyromegaly or masses. RESP Clear to auscultation, no wheezes, rales or rhonchi. Symmetric chest movement while on room air. CARDIO/VASC S1/S2 auscultated. Regular rate without appreciable murmurs, rubs, or gallops. No JVD or carotid bruits. Peripheral pulses equal bilaterally and palpable. No peripheral edema. GI diverging colostomy left lower abdomen clean, no signs of acute hemorrhage . abdomen is soft without significant tenderness, masses, or guarding. Bowel sounds are normoactive. Rectal exam deferred.  No costovertebral angle tenderness. Normal appearing external genitalia. Greene catheter is not present. HEME/LYMPH No palpable cervical lymphadenopathy and no hepatosplenomegaly. No petechiae or ecchymoses. MSK No gross joint deformities. SKIN sacral decubitus ulcer . normal coloration, warm, dry.   NEURO Cranial nerves appear grossly intact, normal speech, no

## 2020-09-12 LAB
BASOPHILS ABSOLUTE: 0 K/CU MM
BASOPHILS RELATIVE PERCENT: 0.3 % (ref 0–1)
C-REACTIVE PROTEIN, HIGH SENSITIVITY: 92.1 MG/L
DIFFERENTIAL TYPE: ABNORMAL
EOSINOPHILS ABSOLUTE: 0.2 K/CU MM
EOSINOPHILS RELATIVE PERCENT: 3.3 % (ref 0–3)
HCT VFR BLD CALC: 26.5 % (ref 42–52)
HEMOGLOBIN: 7.8 GM/DL (ref 13.5–18)
IMMATURE NEUTROPHIL %: 0.9 % (ref 0–0.43)
LYMPHOCYTES ABSOLUTE: 1.2 K/CU MM
LYMPHOCYTES RELATIVE PERCENT: 18.4 % (ref 24–44)
MCH RBC QN AUTO: 26 PG (ref 27–31)
MCHC RBC AUTO-ENTMCNC: 29.4 % (ref 32–36)
MCV RBC AUTO: 88.3 FL (ref 78–100)
MONOCYTES ABSOLUTE: 0.9 K/CU MM
MONOCYTES RELATIVE PERCENT: 13.6 % (ref 0–4)
NUCLEATED RBC %: 0 %
PDW BLD-RTO: 14.9 % (ref 11.7–14.9)
PLATELET # BLD: 245 K/CU MM (ref 140–440)
PMV BLD AUTO: 10.1 FL (ref 7.5–11.1)
PROCALCITONIN: 0.17
RBC # BLD: 3 M/CU MM (ref 4.6–6.2)
SEGMENTED NEUTROPHILS ABSOLUTE COUNT: 4.1 K/CU MM
SEGMENTED NEUTROPHILS RELATIVE PERCENT: 63.5 % (ref 36–66)
TOTAL IMMATURE NEUTOROPHIL: 0.06 K/CU MM
TOTAL NUCLEATED RBC: 0 K/CU MM
WBC # BLD: 6.5 K/CU MM (ref 4–10.5)

## 2020-09-12 PROCEDURE — 2700000000 HC OXYGEN THERAPY PER DAY

## 2020-09-12 PROCEDURE — 6360000002 HC RX W HCPCS: Performed by: HOSPITALIST

## 2020-09-12 PROCEDURE — 6370000000 HC RX 637 (ALT 250 FOR IP)

## 2020-09-12 PROCEDURE — 94150 VITAL CAPACITY TEST: CPT

## 2020-09-12 PROCEDURE — 1200000000 HC SEMI PRIVATE

## 2020-09-12 PROCEDURE — 99024 POSTOP FOLLOW-UP VISIT: CPT | Performed by: SURGERY

## 2020-09-12 PROCEDURE — 85025 COMPLETE CBC W/AUTO DIFF WBC: CPT

## 2020-09-12 PROCEDURE — 2580000003 HC RX 258

## 2020-09-12 PROCEDURE — 86140 C-REACTIVE PROTEIN: CPT

## 2020-09-12 PROCEDURE — 84145 PROCALCITONIN (PCT): CPT

## 2020-09-12 PROCEDURE — 36415 COLL VENOUS BLD VENIPUNCTURE: CPT

## 2020-09-12 PROCEDURE — 94761 N-INVAS EAR/PLS OXIMETRY MLT: CPT

## 2020-09-12 RX ORDER — PANTOPRAZOLE SODIUM 40 MG/1
40 GRANULE, DELAYED RELEASE ORAL
Status: DISCONTINUED | OUTPATIENT
Start: 2020-09-12 | End: 2020-09-19 | Stop reason: HOSPADM

## 2020-09-12 RX ADMIN — METOPROLOL SUCCINATE 25 MG: 25 TABLET, EXTENDED RELEASE ORAL at 10:32

## 2020-09-12 RX ADMIN — TRAMADOL HYDROCHLORIDE 50 MG: 50 TABLET, FILM COATED ORAL at 20:33

## 2020-09-12 RX ADMIN — COLLAGENASE SANTYL: 250 OINTMENT TOPICAL at 10:32

## 2020-09-12 RX ADMIN — FERROUS SULFATE TAB 325 MG (65 MG ELEMENTAL FE) 325 MG: 325 (65 FE) TAB at 10:35

## 2020-09-12 RX ADMIN — FERROUS SULFATE TAB 325 MG (65 MG ELEMENTAL FE) 325 MG: 325 (65 FE) TAB at 18:44

## 2020-09-12 RX ADMIN — SODIUM CHLORIDE, PRESERVATIVE FREE 10 ML: 5 INJECTION INTRAVENOUS at 10:33

## 2020-09-12 RX ADMIN — MIRTAZAPINE 45 MG: 15 TABLET, FILM COATED ORAL at 20:33

## 2020-09-12 RX ADMIN — QUETIAPINE FUMARATE 100 MG: 100 TABLET ORAL at 10:32

## 2020-09-12 RX ADMIN — ENOXAPARIN SODIUM 30 MG: 30 INJECTION SUBCUTANEOUS at 20:33

## 2020-09-12 RX ADMIN — SODIUM CHLORIDE, PRESERVATIVE FREE 10 ML: 5 INJECTION INTRAVENOUS at 20:33

## 2020-09-12 RX ADMIN — TRAMADOL HYDROCHLORIDE 50 MG: 50 TABLET, FILM COATED ORAL at 10:32

## 2020-09-12 RX ADMIN — PANTOPRAZOLE SODIUM 40 MG: 40 GRANULE, DELAYED RELEASE ORAL at 06:35

## 2020-09-12 RX ADMIN — ENOXAPARIN SODIUM 30 MG: 30 INJECTION SUBCUTANEOUS at 10:32

## 2020-09-12 RX ADMIN — PANTOPRAZOLE SODIUM 40 MG: 40 GRANULE, DELAYED RELEASE ORAL at 21:47

## 2020-09-12 RX ADMIN — QUETIAPINE FUMARATE 100 MG: 100 TABLET ORAL at 20:33

## 2020-09-12 RX ADMIN — COLLAGENASE SANTYL: 250 OINTMENT TOPICAL at 06:03

## 2020-09-12 RX ADMIN — SODIUM CHLORIDE, PRESERVATIVE FREE 10 ML: 5 INJECTION INTRAVENOUS at 20:34

## 2020-09-12 ASSESSMENT — PAIN SCALES - WONG BAKER
WONGBAKER_NUMERICALRESPONSE: 0

## 2020-09-12 ASSESSMENT — PAIN SCALES - GENERAL
PAINLEVEL_OUTOF10: 0
PAINLEVEL_OUTOF10: 8
PAINLEVEL_OUTOF10: 0
PAINLEVEL_OUTOF10: 6
PAINLEVEL_OUTOF10: 0
PAINLEVEL_OUTOF10: 0
PAINLEVEL_OUTOF10: 4

## 2020-09-12 ASSESSMENT — PAIN DESCRIPTION - PAIN TYPE: TYPE: ACUTE PAIN

## 2020-09-12 ASSESSMENT — PAIN DESCRIPTION - LOCATION: LOCATION: BUTTOCKS

## 2020-09-12 NOTE — PROGRESS NOTES
Hospitalist Progress Note      Name:  Cely Flores /Age/Sex: 1932  (80 y.o. male)   MRN & CSN:  [id number] & [id number] Admission Date/Time: 2020  1:17 AM   Location:  -A PCP: Winton Moritz, MD         Hospital Day:     Assessment and Plan:   Cely Flores is a 80 y.o.  male  who presents with fever and hypoxia.     > Sepsis secondary to pneumonia versus sacral decubitus ulcers  > Acute hypoxic respiratory failure due to COVID pneumonia - resolved  > COVID-19 positive  -SIRS  monitor for tachycardia  -SARS-COV2 detected on  and  and  and  (persitently positive).    -Continue to trend inflammatory markers  -saturating in the  90s on 2 L nasal cannula  -Blood cultures negative  -Per family no aggressive interventions at this time   -Patient is pending placement  -ID and surgery on board     >Sepsis likely due to sacral decubitus ulcers, unstageable, present on admission; resolved  -S/p I and D at bedside    -Blood cultures negative to date   -local wound care   -Off antibiotics per ID  -Per family no aggressive intervention at this time   -Status post diverting colostomy.   Procedure well-tolerated  -Patient is pending placement  -ID and general surgery on board        > Acute on chronic anemia  -H&H 7.8, baseline 9.7  -Continue to follow H&H, will transfuse if hemoglobin drops below 7  -Monitor closely     >Thrombocytopenia- possible lab error  -Patient has multiple episodes of low platelets which -spontaneously recovered the next day  -Likely patient is having clumps  -Lovenox resumed     >Acute kidney injury on CKD 3 -resolved      >Elevated liver enzymes -Stable   Recent hepatitis panel negative  Recommend follow-up as outpatient with GI     >Elevated d-dimer -VQ scan was done on admission, shows low probability of PE      >Chronic issues   Hypertension   Depression  COPD, not in exacerbation  Moderate PCM   Anemia of chronic disease    Diet DIET DYSPHAGIA PUREED; Dysphagia Minced and Moist; Mildly Thick (Nectar)   DVT Prophylaxis [] Lovenox, []  Heparin, [] SCDs, [] Ambulation   GI Prophylaxis [] PPI,  [] H2 Blocker,  [] Carafate,  [] Diet/Tube Feeds   Code Status Limited   Disposition Patient pending placement   MDM [] Low, [] Moderate,[]  High  Patient's risk as above due to      History of Present Illness:     Chief Complaint: <principal problem not specified>  Nancie Book is a 80 y.o.  male  who presents with fever and hypoxia       Ten point ROS reviewed negative, unless as noted above    Objective: Intake/Output Summary (Last 24 hours) at 9/12/2020 1324  Last data filed at 9/12/2020 4790  Gross per 24 hour   Intake --   Output 750 ml   Net -750 ml      Vitals:   Vitals:    09/12/20 0954   BP: 120/73   Pulse: 74   Resp: 24   Temp: 98.5 °F (36.9 °C)   SpO2: 100%     Physical Exam:   GEN Awake male, sitting upright in bed in no apparent distress. Appears given age. EYES Pupils are equally round. No scleral erythema, discharge, or conjunctivitis. HENT Mucous membranes are moist. Oral pharynx without exudates, no evidence of thrush. NECK Supple, no apparent thyromegaly or masses. RESP Clear to auscultation, no wheezes, rales or rhonchi. Symmetric chest movement while on room air. CARDIO/VASC S1/S2 auscultated. Regular rate without appreciable murmurs, rubs, or gallops. No JVD or carotid bruits. Peripheral pulses equal bilaterally and palpable. No peripheral edema. GI colostomy in place . abdomen is soft without significant tenderness, masses, or guarding. Bowel sounds are normoactive. Rectal exam deferred.  No costovertebral angle tenderness. Normal appearing external genitalia. Greene catheter is not present. HEME/LYMPH No palpable cervical lymphadenopathy and no hepatosplenomegaly. No petechiae or ecchymoses. MSK No gross joint deformities. SKIN Normal coloration, warm, dry.   NEURO Cranial nerves appear grossly intact, normal speech, no lateralizing weakness. PSYCH Awake, alert. Affect appropriate.     Medications:   Medications:    pantoprazole sodium  40 mg Oral BID AC    collagenase   Topical BID    enoxaparin  30 mg Subcutaneous BID    lidocaine PF  5 mL Intradermal Once    sodium chloride flush  10 mL Intravenous 2 times per day    ferrous sulfate  325 mg Oral BID WC    sodium chloride flush  10 mL Intravenous 2 times per day    metoprolol succinate  25 mg Oral Daily    mirtazapine  45 mg Oral Nightly    QUEtiapine  100 mg Oral BID      Infusions:   PRN Meds: oxyCODONE, 5 mg, Q6H PRN  sodium chloride flush, 10 mL, PRN  traMADol, 50 mg, Q6H PRN  sodium chloride flush, 10 mL, PRN  acetaminophen, 650 mg, Q6H PRN    Or  acetaminophen, 650 mg, Q6H PRN  polyethylene glycol, 17 g, Daily PRN  promethazine, 12.5 mg, Q6H PRN    Or  ondansetron, 4 mg, Q6H PRN          Electronically signed by Maria Elena Godinez MD on 9/12/2020 at 1:24 PM

## 2020-09-12 NOTE — PROGRESS NOTES
GENERAL SURGERY PROGRESS NOTE    CC/HPI:           Patient feels better from his surgery. Vitals:    09/11/20 2204 09/12/20 0103 09/12/20 0356 09/12/20 0406   BP:   (!) 149/67    Pulse: 91 81 91 80   Resp: 25 17 19 28   Temp:   99.5 °F (37.5 °C)    TempSrc:   Axillary    SpO2:   96% 97%   Weight:       Height:         I/O last 3 completed shifts:  In: -   Out: 750 [Urine:725; Stool:25]  No intake/output data recorded. DIET DYSPHAGIA PUREED;  Dysphagia Minced and Moist; Mildly Thick (Nectar)    Recent Results (from the past 48 hour(s))   High sensitivity CRP    Collection Time: 09/10/20  7:36 PM   Result Value Ref Range    CRP High Sensitivity 92.0 (H) <8.5 mg/L   Procalcitonin    Collection Time: 09/10/20  7:36 PM   Result Value Ref Range    Procalcitonin 0.160    CBC auto differential    Collection Time: 09/10/20  7:36 PM   Result Value Ref Range    WBC 8.5 4.0 - 10.5 K/CU MM    RBC 3.01 (L) 4.6 - 6.2 M/CU MM    Hemoglobin 7.9 (L) 13.5 - 18.0 GM/DL    Hematocrit 26.8 (L) 42 - 52 %    MCV 89.0 78 - 100 FL    MCH 26.2 (L) 27 - 31 PG    MCHC 29.5 (L) 32.0 - 36.0 %    RDW 14.9 11.7 - 14.9 %    Platelets 744 937 - 269 K/CU MM    MPV 9.6 7.5 - 11.1 FL    Differential Type AUTOMATED DIFFERENTIAL     Segs Relative 85.8 (H) 36 - 66 %    Lymphocytes % 6.9 (L) 24 - 44 %    Monocytes % 6.6 (H) 0 - 4 %    Eosinophils % 0.0 0 - 3 %    Basophils % 0.2 0 - 1 %    Segs Absolute 7.3 K/CU MM    Lymphocytes Absolute 0.6 K/CU MM    Monocytes Absolute 0.6 K/CU MM    Eosinophils Absolute 0.0 K/CU MM    Basophils Absolute 0.0 K/CU MM    Nucleated RBC % 0.0 %    Total Nucleated RBC 0.0 K/CU MM    Total Immature Neutrophil 0.04 K/CU MM    Immature Neutrophil % 0.5 (H) 0 - 0.43 %   Basic Metabolic Panel    Collection Time: 09/10/20  7:36 PM   Result Value Ref Range    Sodium 141 135 - 145 MMOL/L    Potassium 5.0 3.5 - 5.1 MMOL/L    Chloride 103 99 - 110 mMol/L    CO2 26 21 - 32 MMOL/L    Anion Gap 12 4 - 16    BUN 32 (H) 6 - 23 MG/DL    CREATININE 1.4 (H) 0.9 - 1.3 MG/DL    Glucose 125 (H) 70 - 99 MG/DL    Calcium 8.3 8.3 - 10.6 MG/DL    GFR Non- 48 (L) >60 mL/min/1.73m2    GFR  58 (L) >60 mL/min/1.73m2   CBC auto differential    Collection Time: 09/11/20  5:32 AM   Result Value Ref Range    WBC 6.8 4.0 - 10.5 K/CU MM    RBC 2.90 (L) 4.6 - 6.2 M/CU MM    Hemoglobin 7.8 (L) 13.5 - 18.0 GM/DL    Hematocrit 26.7 (L) 42 - 52 %    MCV 92.1 78 - 100 FL    MCH 26.9 (L) 27 - 31 PG    MCHC 29.2 (L) 32.0 - 36.0 %    RDW 15.0 (H) 11.7 - 14.9 %    Platelets 004 301 - 989 K/CU MM    MPV 9.6 7.5 - 11.1 FL    Differential Type AUTOMATED DIFFERENTIAL     Segs Relative 68.9 (H) 36 - 66 %    Lymphocytes % 16.8 (L) 24 - 44 %    Monocytes % 11.7 (H) 0 - 4 %    Eosinophils % 1.6 0 - 3 %    Basophils % 0.3 0 - 1 %    Segs Absolute 4.7 K/CU MM    Lymphocytes Absolute 1.1 K/CU MM    Monocytes Absolute 0.8 K/CU MM    Eosinophils Absolute 0.1 K/CU MM    Basophils Absolute 0.0 K/CU MM    Nucleated RBC % 0.0 %    Total Nucleated RBC 0.0 K/CU MM    Total Immature Neutrophil 0.05 K/CU MM    Immature Neutrophil % 0.7 (H) 0 - 0.43 %       Scheduled Meds:   pantoprazole sodium  40 mg Oral BID AC    enoxaparin  30 mg Subcutaneous BID    collagenase   Topical Daily    lidocaine PF  5 mL Intradermal Once    sodium chloride flush  10 mL Intravenous 2 times per day    collagenase   Topical BID    ferrous sulfate  325 mg Oral BID WC    sodium chloride flush  10 mL Intravenous 2 times per day    metoprolol succinate  25 mg Oral Daily    mirtazapine  45 mg Oral Nightly    QUEtiapine  100 mg Oral BID       Continuous Infusions:    Physical Exam:  HEENT: Anicteric sclerae, Oropharyngeal mucosae moist, pink and intact. Heart:  Normal S1 and S2, RRR  Lungs: Clear to auscultation bilaterally, No audible Wheezes or Rales. Extremities: No edema. Neuro:  Non focal.  Abdomen: Soft, Benign, Non tender, Non distended, Positive bowel sounds. Colostomy viable and functioning. Incision: Nicely healing: No erythema, No discharge. 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    Pneumonia due to COVID-19 virus    Osteomyelitis of sacrum (HCC)    Moderate malnutrition ([facility] [address]  Resolved Problems:    * No resolved hospital problems. *      Assessment and Plan:  Darine Stone is a 80 y.o. male who is POD # 2 status post:  Lap diverting colostomy. Tolerating PO diet well, Colostomy viable and functioning, continue post op care. Respiratory linda-operative care: Incentive Spirometry / deep breathing and coughing 10x/hr while awake. Continue DVT prophylaxis with Teds and SCDs and SC Lovenox.   Continue GI prophylaxis with Protonix IV till able to tolerate PO.    ___________________________________________    Mariela Jacques MD, FACS, FICS  9/12/2020  8:41 AM

## 2020-09-13 LAB
BASOPHILS ABSOLUTE: 0 K/CU MM
BASOPHILS RELATIVE PERCENT: 0.7 % (ref 0–1)
DIFFERENTIAL TYPE: ABNORMAL
EOSINOPHILS ABSOLUTE: 0.2 K/CU MM
EOSINOPHILS RELATIVE PERCENT: 3.3 % (ref 0–3)
HCT VFR BLD CALC: 34.4 % (ref 42–52)
HEMOGLOBIN: 9.2 GM/DL (ref 13.5–18)
IMMATURE NEUTROPHIL %: 0.8 % (ref 0–0.43)
LYMPHOCYTES ABSOLUTE: 1.6 K/CU MM
LYMPHOCYTES RELATIVE PERCENT: 26.3 % (ref 24–44)
MCH RBC QN AUTO: 26.2 PG (ref 27–31)
MCHC RBC AUTO-ENTMCNC: 26.7 % (ref 32–36)
MCV RBC AUTO: 98 FL (ref 78–100)
MONOCYTES ABSOLUTE: 0.9 K/CU MM
MONOCYTES RELATIVE PERCENT: 14.7 % (ref 0–4)
NUCLEATED RBC %: 0 %
PDW BLD-RTO: 15.2 % (ref 11.7–14.9)
PLATELET # BLD: 131 K/CU MM (ref 140–440)
PMV BLD AUTO: 9.9 FL (ref 7.5–11.1)
RBC # BLD: 3.51 M/CU MM (ref 4.6–6.2)
SEGMENTED NEUTROPHILS ABSOLUTE COUNT: 3.3 K/CU MM
SEGMENTED NEUTROPHILS RELATIVE PERCENT: 54.2 % (ref 36–66)
TOTAL IMMATURE NEUTOROPHIL: 0.05 K/CU MM
TOTAL NUCLEATED RBC: 0 K/CU MM
WBC # BLD: 6.1 K/CU MM (ref 4–10.5)

## 2020-09-13 PROCEDURE — 94150 VITAL CAPACITY TEST: CPT

## 2020-09-13 PROCEDURE — 6360000002 HC RX W HCPCS: Performed by: HOSPITALIST

## 2020-09-13 PROCEDURE — 6370000000 HC RX 637 (ALT 250 FOR IP)

## 2020-09-13 PROCEDURE — 85025 COMPLETE CBC W/AUTO DIFF WBC: CPT

## 2020-09-13 PROCEDURE — 2580000003 HC RX 258

## 2020-09-13 PROCEDURE — 2700000000 HC OXYGEN THERAPY PER DAY

## 2020-09-13 PROCEDURE — 94761 N-INVAS EAR/PLS OXIMETRY MLT: CPT

## 2020-09-13 PROCEDURE — 51702 INSERT TEMP BLADDER CATH: CPT

## 2020-09-13 PROCEDURE — 1200000000 HC SEMI PRIVATE

## 2020-09-13 RX ADMIN — FERROUS SULFATE TAB 325 MG (65 MG ELEMENTAL FE) 325 MG: 325 (65 FE) TAB at 17:56

## 2020-09-13 RX ADMIN — ENOXAPARIN SODIUM 30 MG: 30 INJECTION SUBCUTANEOUS at 21:25

## 2020-09-13 RX ADMIN — FERROUS SULFATE TAB 325 MG (65 MG ELEMENTAL FE) 325 MG: 325 (65 FE) TAB at 09:11

## 2020-09-13 RX ADMIN — SODIUM CHLORIDE, PRESERVATIVE FREE 10 ML: 5 INJECTION INTRAVENOUS at 21:26

## 2020-09-13 RX ADMIN — MIRTAZAPINE 45 MG: 15 TABLET, FILM COATED ORAL at 21:24

## 2020-09-13 RX ADMIN — SODIUM CHLORIDE, PRESERVATIVE FREE 10 ML: 5 INJECTION INTRAVENOUS at 09:11

## 2020-09-13 RX ADMIN — PANTOPRAZOLE SODIUM 40 MG: 40 GRANULE, DELAYED RELEASE ORAL at 05:54

## 2020-09-13 RX ADMIN — ENOXAPARIN SODIUM 30 MG: 30 INJECTION SUBCUTANEOUS at 09:10

## 2020-09-13 RX ADMIN — QUETIAPINE FUMARATE 100 MG: 100 TABLET ORAL at 09:10

## 2020-09-13 RX ADMIN — METOPROLOL SUCCINATE 25 MG: 25 TABLET, EXTENDED RELEASE ORAL at 09:10

## 2020-09-13 RX ADMIN — SODIUM CHLORIDE, PRESERVATIVE FREE 10 ML: 5 INJECTION INTRAVENOUS at 21:24

## 2020-09-13 RX ADMIN — PANTOPRAZOLE SODIUM 40 MG: 40 GRANULE, DELAYED RELEASE ORAL at 17:56

## 2020-09-13 RX ADMIN — QUETIAPINE FUMARATE 100 MG: 100 TABLET ORAL at 21:25

## 2020-09-13 ASSESSMENT — PAIN SCALES - GENERAL
PAINLEVEL_OUTOF10: 0

## 2020-09-13 ASSESSMENT — PAIN SCALES - WONG BAKER
WONGBAKER_NUMERICALRESPONSE: 0

## 2020-09-13 NOTE — PROGRESS NOTES
Prophylaxis [] Lovenox, []  Heparin, [] SCDs, [] Ambulation   GI Prophylaxis [] PPI,  [] H2 Blocker,  [] Carafate,  [] Diet/Tube Feeds   Code Status Limited   Disposition Patient pending placement   MDM [] Low, [] Moderate,[]  High  Patient's risk as above due to      History of Present Illness:     Chief Complaint: <principal problem not specified>  Shanda Kumar is a 80 y.o.  male  who presents with fever and hypoxia       Ten point ROS reviewed negative, unless as noted above    Objective: Intake/Output Summary (Last 24 hours) at 9/13/2020 1119  Last data filed at 9/13/2020 0909  Gross per 24 hour   Intake 1260 ml   Output 885 ml   Net 375 ml      Vitals:   Vitals:    09/13/20 0909   BP: (!) 159/70   Pulse: 76   Resp: 15   Temp: 98.8 °F (37.1 °C)   SpO2: 100%     Physical Exam:   GEN Awake male, sitting upright in bed in no apparent distress. Appears given age. EYES Pupils are equally round. No scleral erythema, discharge, or conjunctivitis. HENT Mucous membranes are moist. Oral pharynx without exudates, no evidence of thrush. NECK Supple, no apparent thyromegaly or masses. RESP Clear to auscultation, no wheezes, rales or rhonchi. Symmetric chest movement while on room air. CARDIO/VASC S1/S2 auscultated. Regular rate without appreciable murmurs, rubs, or gallops. No JVD or carotid bruits. Peripheral pulses equal bilaterally and palpable. No peripheral edema. GI colostomy in place . abdomen is soft without significant tenderness, masses, or guarding. Bowel sounds are normoactive. Rectal exam deferred.  No costovertebral angle tenderness. Normal appearing external genitalia. Greene catheter present  HEME/LYMPH No palpable cervical lymphadenopathy and no hepatosplenomegaly. No petechiae or ecchymoses. MSK No gross joint deformities. SKIN Normal coloration, warm, dry. NEURO Cranial nerves appear grossly intact, normal speech, no lateralizing weakness. PSYCH Awake, alert, oriented x 4.   Affect appropriate.     Medications:   Medications:    pantoprazole sodium  40 mg Oral BID AC    collagenase   Topical BID    enoxaparin  30 mg Subcutaneous BID    lidocaine PF  5 mL Intradermal Once    sodium chloride flush  10 mL Intravenous 2 times per day    ferrous sulfate  325 mg Oral BID WC    sodium chloride flush  10 mL Intravenous 2 times per day    metoprolol succinate  25 mg Oral Daily    mirtazapine  45 mg Oral Nightly    QUEtiapine  100 mg Oral BID      Infusions:   PRN Meds: oxyCODONE, 5 mg, Q6H PRN  sodium chloride flush, 10 mL, PRN  traMADol, 50 mg, Q6H PRN  sodium chloride flush, 10 mL, PRN  acetaminophen, 650 mg, Q6H PRN    Or  acetaminophen, 650 mg, Q6H PRN  polyethylene glycol, 17 g, Daily PRN  promethazine, 12.5 mg, Q6H PRN    Or  ondansetron, 4 mg, Q6H PRN          Electronically signed by Greta Macias MD on 9/13/2020 at 11:19 AM

## 2020-09-13 NOTE — PROGRESS NOTES
Pt's crowley had a lot of sediment on the inner tube of the crowley and in the crowley's inner catheter. Tried to flush catheter, but met resistance. Deflated balloon, and inserted more of the crowley and reinflated the balloon and tried to flush again but met resistance again. Sent perfect serve message to Dr. Lurdes Soriano, she said to go ahead and remove and reinsert. When I removed crowley, the end of the inner catheter of the crowley that was in the pt had so much sediment in it that I couldn't even flush it even after it was out of pt. Reinserted a 16 Telugu crowley catheter with sterile technique. Urine returned yellow but cloudy. Cleaned linda care. Will continue to monitor.

## 2020-09-14 PROCEDURE — 6370000000 HC RX 637 (ALT 250 FOR IP)

## 2020-09-14 PROCEDURE — 99231 SBSQ HOSP IP/OBS SF/LOW 25: CPT | Performed by: INTERNAL MEDICINE

## 2020-09-14 PROCEDURE — 2700000000 HC OXYGEN THERAPY PER DAY

## 2020-09-14 PROCEDURE — 6360000002 HC RX W HCPCS: Performed by: HOSPITALIST

## 2020-09-14 PROCEDURE — 2580000003 HC RX 258

## 2020-09-14 PROCEDURE — 94761 N-INVAS EAR/PLS OXIMETRY MLT: CPT

## 2020-09-14 PROCEDURE — 1200000000 HC SEMI PRIVATE

## 2020-09-14 RX ADMIN — MIRTAZAPINE 45 MG: 15 TABLET, FILM COATED ORAL at 21:23

## 2020-09-14 RX ADMIN — SODIUM CHLORIDE, PRESERVATIVE FREE 10 ML: 5 INJECTION INTRAVENOUS at 08:04

## 2020-09-14 RX ADMIN — SODIUM CHLORIDE, PRESERVATIVE FREE 10 ML: 5 INJECTION INTRAVENOUS at 21:23

## 2020-09-14 RX ADMIN — ENOXAPARIN SODIUM 30 MG: 30 INJECTION SUBCUTANEOUS at 21:23

## 2020-09-14 RX ADMIN — FERROUS SULFATE TAB 325 MG (65 MG ELEMENTAL FE) 325 MG: 325 (65 FE) TAB at 08:03

## 2020-09-14 RX ADMIN — QUETIAPINE FUMARATE 100 MG: 100 TABLET ORAL at 21:23

## 2020-09-14 RX ADMIN — FERROUS SULFATE TAB 325 MG (65 MG ELEMENTAL FE) 325 MG: 325 (65 FE) TAB at 17:28

## 2020-09-14 RX ADMIN — PANTOPRAZOLE SODIUM 40 MG: 40 GRANULE, DELAYED RELEASE ORAL at 08:02

## 2020-09-14 RX ADMIN — ENOXAPARIN SODIUM 30 MG: 30 INJECTION SUBCUTANEOUS at 08:04

## 2020-09-14 RX ADMIN — PANTOPRAZOLE SODIUM 40 MG: 40 GRANULE, DELAYED RELEASE ORAL at 17:28

## 2020-09-14 RX ADMIN — METOPROLOL SUCCINATE 25 MG: 25 TABLET, EXTENDED RELEASE ORAL at 08:03

## 2020-09-14 RX ADMIN — OXYCODONE HYDROCHLORIDE 5 MG: 5 TABLET ORAL at 08:03

## 2020-09-14 RX ADMIN — QUETIAPINE FUMARATE 100 MG: 100 TABLET ORAL at 08:03

## 2020-09-14 ASSESSMENT — PAIN SCALES - WONG BAKER
WONGBAKER_NUMERICALRESPONSE: 0

## 2020-09-14 ASSESSMENT — PAIN DESCRIPTION - PAIN TYPE: TYPE: CHRONIC PAIN

## 2020-09-14 ASSESSMENT — PAIN SCALES - GENERAL
PAINLEVEL_OUTOF10: 0
PAINLEVEL_OUTOF10: 5
PAINLEVEL_OUTOF10: 0

## 2020-09-14 NOTE — PROGRESS NOTES
Infectious Disease Progress Note  2020   Patient Name: Idania Mai : 1932       Reason for visit: F/u COVID-19, stage IV sacral decubitus ulcer ? History:? Interval history noted, s/p diverting colostomy on 9/10  Dementia, denies any complaints. Afebrile  Physical Exam:  Vital Signs: /62   Pulse 87   Temp 98.2 °F (36.8 °C) (Oral)   Resp 16   Ht 5' 7.99\" (1.727 m)   Wt 136 lb 0.4 oz (61.7 kg) Comment: actually reading - then the number needs reset   SpO2 100%   BMI 20.69 kg/m²     Gen: alert and oriented, memory deficits  Skin: no stigmata of endocarditis  Wounds: Stage IV sacral decubitus ulcer, with areas of necrosis   HEMT: AT/NC Oropharynx pink, moist, and without lesions or exudates; dentition in good state of repair  Eyes: PERRLA, EOMI, conjunctiva pink, sclera anicteric. Neck: Supple. Trachea midline. No LAD. Chest: no distress and CTA. Good air movement. Heart: RRR and no MRG. Abd: LLQ colostomy, soft, non-distended, no tenderness, no hepatomegaly. Normoactive bowel sounds. Ext: no clubbing, cyanosis, or edema  Catheter Site: without erythema or tenderness  Neuro: Mental status intact. CN 2-12 intact and no focal sensory or motor deficits     Radiologic / Imaging / TESTING  No results found. Labs:    No results found for this or any previous visit (from the past 24 hour(s)).   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 *      Impression and plan   Clinical status: stable   Therapeutic:   Diagnostic:    F/u: 9/1-blood cx ngtd    Other:  Summary: afebrile,  Repeat SARS-CoV-2 test positive on 8/21 and 8/29. SARS-CoV-2 PCR testing remains repeatedly positive. 9/9/2020 was positive. Not unusual in elderly men.s/p diverting colostomy on 9/10 to help healing of stage III sacral ulcer.      Electronically signed by: Electronically signed by Tracey Snider MD on 9/14/2020 at 1:40 PM

## 2020-09-14 NOTE — PROGRESS NOTES
Hospitalist Progress Note      Name:  Shanda Kumar /Age/Sex: 1932  (80 y.o. male)   MRN & CSN:  [id number] & [id number] Admission Date/Time: 2020  1:17 AM   Location:  -A PCP: Alexis Smith MD         Hospital Day: 25    Assessment and Plan:   Bertrum Cowden a 80 y.o.  male  who presents with fever and hypoxia. Plan is for placement once COVID results are negative.     > Sepsis secondary to pneumonia versus sacral decubitus ulcers  > Acute hypoxic respiratory failure due to COVID pneumonia - resolved  > COVID-19 positive  -SARS-COV2 detected on  and  and  and  (persitently positive).    -We will send repeat COVID test pending  -Vitally stable  -Blood cultures negative  -Per family no aggressive interventions at this time   -Patient is pending placement once COVID results are negative  -ID and surgery on board     >Sepsis likely due to sacral decubitus ulcers, unstageable, present on admission; resolved  -S/p I and D at bedside    -Blood cultures negative to date   -local wound care   -Off antibiotics per ID  -Per family no aggressive intervention at this time   -Status post diverting colostomy.  Procedure well-tolerated  -Patient is pending placement  -ID and general surgery on board        > Acute on chronic anemia  -Continue to follow H&H, will transfuse if hemoglobin drops below 7  -Monitor closely     >Thrombocytopenia- possible lab error  -Patient has multiple episodes of low platelets which -spontaneously recovered the next day  -Likely patient is having clumps  -Lovenox resumed     >Acute kidney injury on CKD 3 -resolved      >Elevated liver enzymes -Stable   Recent hepatitis panel negative  Recommend follow-up as outpatient with GI     >Elevated d-dimer -VQ scan was done on admission, shows low probability of PE      >Chronic issues   Hypertension   Depression  COPD, not in exacerbation  Moderate PCM   Anemia of chronic disease    Diet DIET CARDIAC; intact, normal speech, no lateralizing weakness. PSYCH Awake, alert, oriented x 4. Affect appropriate.     Medications:   Medications:    pantoprazole sodium  40 mg Oral BID AC    collagenase   Topical BID    enoxaparin  30 mg Subcutaneous BID    lidocaine PF  5 mL Intradermal Once    sodium chloride flush  10 mL Intravenous 2 times per day    ferrous sulfate  325 mg Oral BID WC    sodium chloride flush  10 mL Intravenous 2 times per day    metoprolol succinate  25 mg Oral Daily    mirtazapine  45 mg Oral Nightly    QUEtiapine  100 mg Oral BID      Infusions:   PRN Meds: oxyCODONE, 5 mg, Q6H PRN  sodium chloride flush, 10 mL, PRN  traMADol, 50 mg, Q6H PRN  sodium chloride flush, 10 mL, PRN  acetaminophen, 650 mg, Q6H PRN    Or  acetaminophen, 650 mg, Q6H PRN  polyethylene glycol, 17 g, Daily PRN  promethazine, 12.5 mg, Q6H PRN    Or  ondansetron, 4 mg, Q6H PRN          Electronically signed by Liza Garcia MD on 9/14/2020 at 12:19 PM

## 2020-09-14 NOTE — CARE COORDINATION
Last covid 9/9 pos. Last tylenol given 9/3. Pt on O2 per NC at 3l min. First positive Covid on 8/21. Covid 9/9 Positive. Pt had Laparoscopic loop colostomy placement on 9/10  Per previous CM notes  Guidelines from facility for pt to return. Patient does not need a negative COVID test to return   Patient will need to be symptom free for 10 days past first positive COVID test   Patient will need to be symptom free for 20 days past first positive COVID test if immunosuppressed and on steroids   Patient cannot be on any fever reducing medications 72 hours prior to admission to SNF  [facility]  CM called pts nurse and will await for her to call back. [facility]  1240 CM spoke with Reymundo Pratt RN and CM informed that pt is appropriate for PT/OT evals. CM whiteboarded. CM pending return call from Ria Terry at [facility]. [facility]

## 2020-09-14 NOTE — PROGRESS NOTES
GENERAL SURGERY PROGRESS NOTE    Francisco Saleh is a 80 y.o. male with 4 Days Post-Op laparoscopic diverting loop colostomy . Subjective:    Pain: denies pain  BM: + dark ostomy output  Diet: DIET CARDIAC; Dysphagia Minced and Moist; Mildly Thick (Nectar)  Dietary Nutrition Supplements: Standard High Calorie Oral Supplement  Activity: Minimal    Pt is confused. Unable to get complete ROS    Objective:    Vitals: VITALS:  BP (!) 144/76   Pulse 85   Temp 98.2 °F (36.8 °C) (Oral)   Resp 16   Ht 5' 7.99\" (1.727 m)   Wt 136 lb 0.4 oz (61.7 kg) Comment: actually reading - then the number needs reset   SpO2 100%   BMI 20.69 kg/m²   TEMPERATURE:  Current - Temp: 98.2 °F (36.8 °C);  Max - Temp  Av.2 °F (36.8 °C)  Min: 97.6 °F (36.4 °C)  Max: 99 °F (37.2 °C)    I/O:  0701 -  0700  In: 1260 [P.O.:1260]  Out: 645 [Urine:600]    Labs/Imaging Results:   Lab Results   Component Value Date    WBC 6.1 2020    HGB 9.2 (L) 2020    HCT 34.4 (L) 2020    MCV 98.0 2020     (L) 2020     Lab Results   Component Value Date     09/10/2020    K 5.0 09/10/2020     09/10/2020    CO2 26 09/10/2020    BUN 32 (H) 09/10/2020    CREATININE 1.4 (H) 09/10/2020    GLUCOSE 125 (H) 09/10/2020    CALCIUM 8.3 09/10/2020    PROT 5.4 (L) 2020    LABALBU 2.6 (L) 2020    BILITOT 0.5 2020    ALKPHOS 241 (H) 2020    AST 88 (H) 2020     (H) 2020    LABGLOM 48 (L) 09/10/2020    GFRAA 58 (L) 09/10/2020       Scheduled Meds:   pantoprazole sodium, 40 mg, Oral, BID AC    collagenase, , Topical, BID    enoxaparin, 30 mg, Subcutaneous, BID    lidocaine PF, 5 mL, Intradermal, Once    sodium chloride flush, 10 mL, Intravenous, 2 times per day    ferrous sulfate, 325 mg, Oral, BID WC    sodium chloride flush, 10 mL, Intravenous, 2 times per day    metoprolol succinate, 25 mg, Oral, Daily    mirtazapine, 45 mg, Oral, Nightly   QUEtiapine, 100 mg, Oral, BID    Physical Exam:    Physical Exam  HENT:      Head: Normocephalic. Nose: Nose normal.      Mouth/Throat:      Mouth: Mucous membranes are moist.   Eyes:      Extraocular Movements: Extraocular movements intact. Cardiovascular:      Rate and Rhythm: Normal rate. Pulmonary:      Effort: Pulmonary effort is normal.   Abdominal:      General: Abdomen is flat. Palpations: Abdomen is soft. Comments: Ostomy functioning with dark stool in the bag. Incisions c/d/i with skin glue. No erythema. Musculoskeletal: Normal range of motion. Skin:     General: Skin is warm. Neurological:      Mental Status: He is alert. Mental status is at baseline. He is disoriented.            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

## 2020-09-14 NOTE — OP NOTE
Operative Report    Patient ID:  Darien Stone  [id number]  81 y.o.  12/31/1932    Indications: Large sacral wound with fecal incontinence    Pre-operative Diagnosis: Large sacral wound with fecal incontinence    Post-operative Diagnosis: same    Procedure:  Laparoscopic loop colostomy placement    Surgeon: Abe Mario MD    Findings: Same    Estimated Blood Loss:  Minimal           Total IV Fluids: 500 ml            Complications:  None; patient tolerated the procedure well. Disposition: PACU - hemodynamically stable. Condition: stable    Procedure Details: The patient was seen again in the Holding Room. The risks, benefits, complications, treatment options, and expected outcomes were discussed with the patient. The possibilities of reaction to medication, pulmonary aspiration, perforation of viscus, bleeding, recurrent infection, the need for additional procedures, and development of a complication requiring transfusion or further operation were discussed with the patient and/or family. There was concurrence with the proposed plan, and informed consent was obtained. The site of surgery was properly noted/marked. The patient was taken to the Operating Room, identified as Darien Stone, and the procedure verified. A Time Out was held and the above information confirmed. The patient was brought into the operating room and placed supine. The abdomen was prepped and draped in the usual sterile fashion. Using a 5 mm optical trocar, the right upper quadrant was entered without incidence. CO2 insufflation was tolerated and the patient positioned in steep Trendelenburg with the left side up. Additional 12 mm trocar in the left periumbilical region was placed and then another 5 mm trochar in the suprapubic was placed. The omentum, transverse colon, and small bowel were reflected cephalad and the sigmoid colon was mobilized from the white line of [place] using scissor diathermy.  The floppy

## 2020-09-14 NOTE — DISCHARGE INSTR - COC
Continuity of Care Form    Patient Name: Cris Coleman   :  1932  MRN:  [id number]    Admit date:  2020  Discharge date:  2020    Code Status Order: Limited   Advance Directives:   [facility] Documentation       Date/Time Healthcare Directive Type of Healthcare Directive Copy in [facility] [address] Agent's Name Healthcare Agent's Phone Number    09/10/20 1406  Yes, patient has an advance directive for healthcare treatment -- -- -- -- --    20 0618  No, patient does not have an advance directive for healthcare treatment -- -- -- -- --            Admitting Physician:  Ginger Ramsey MD  PCP: Jasper Goldberg, MD    Discharging Nurse: SUSANNAH Ramesh [address] Unit/Room#: -A  Discharging Unit Phone Number: [phone]    Emergency Contact:   Extended Emergency Contact Information  Primary Emergency Contact: [facility], [address] [place] [address] Phone: [phone]  Mobile Phone: [phone]  Relation: Child  Secondary Emergency Contact: Ruben Ahumada  Mobile Phone: [phone]  Relation: Child    Past Surgical History:  Past Surgical History:   Procedure Laterality Date    JOINT REPLACEMENT      right hip    LAPAROSCOPY N/A 9/10/2020    LAPAROSCOPY FOR DIVERTING COLOSTOMY performed by Morenita Toure MD at [address] N/A 2020    EGD BIOPSY performed by Denver Hart, MD at [facility] ENDOSCOPY       Immunization History: There is no immunization history on file for this patient.     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 [facility]) L89.154    Pneumonia due to COVID-19 virus U07.1, J12.89    Osteomyelitis of sacrum ([facility]) M46.28    Moderate malnutrition ([facility]) E44.0       Isolation/Infection:   Isolation            Droplet Plus          Patient Infection Status       Infection Onset Added Last Indicated Last Indicated By Review Planned Expiration Resolved Resolved By    COVID-19 08/21/20 08/22/20 09/09/20 COVID-19 09/17/20 09/23/20      Resolved    COVID-19 Rule Out 09/08/20 09/09/20 09/08/20 Covid-19 Ambulatory (Ordered)   09/09/20 Rule-Out Test Resulted    COVID-19 Rule Out 09/01/20 09/01/20 09/01/20 Covid-19 Ambulatory (Ordered)   09/02/20 Elda Graham LPN    SNKLV-22 Rule Out 08/30/20 08/30/20 08/30/20 Covid-19 Ambulatory (Ordered)   08/30/20 Rule-Out Test Resulted    COVID-19 Rule Out 08/29/20 08/29/20 08/29/20 Covid-19 Ambulatory (Ordered)   08/30/20 Elda Graham LPN    GCMKZ-15 Rule Out 08/21/20 08/21/20 08/21/20 Covid-19 Ambulatory (Ordered)   08/22/20 Rule-Out Test Resulted    COVID-19 Rule Out 08/02/20 08/02/20 08/02/20 Covid-19 Ambulatory (Ordered)   08/03/20 Rule-Out Test Resulted            Nurse Assessment:  Last Vital Signs: /62   Pulse 87   Temp 98.2 °F (36.8 °C) (Oral)   Resp 16   Ht 5' 7.99\" (1.727 m)   Wt 136 lb 0.4 oz (61.7 kg) Comment: actually reading - then the number needs reset   SpO2 100%   BMI 20.69 kg/m²     Last documented pain score (0-10 scale): Pain Level: 5  Last Weight:   Wt Readings from Last 1 Encounters:   09/13/20 136 lb 0.4 oz (61.7 kg)     Mental Status:  disoriented and alert    IV Access:  - None    Nursing Mobility/ADLs:  Walking   Dependent  Transfer  Dependent  Bathing  Dependent  Dressing  Dependent  Toileting  Dependent  Feeding  Assisted  Med Admin  Dependent  Med Delivery   whole and prefers mixed with [place]    Wound Care Documentation and Therapy:  Wound 08/10/20 Sacrum and buttock (Active)   Wound Pressure Unstageable 09/14/20 0000 Dressing Status Clean;Dry; Intact 09/14/20 0941   Dressing Changed Changed/New 09/13/20 1600   Dressing/Treatment ABD; Moist to dry; Pharmaceutical agent (see MAR) 09/14/20 0400   Wound Cleansed Rinsed/Irrigated with saline 09/13/20 1600   Dressing Change Due 09/14/20 09/14/20 0941   Wound Length (cm) 7.5 cm 09/13/20 0800   Wound Width (cm) 12 cm 09/13/20 0800   Wound Depth (cm) 4.3 cm 09/13/20 0800   Wound Surface Area (cm^2) 90 cm^2 09/13/20 0800   Change in Wound Size % (l*w) 0 09/13/20 0800   Wound Volume (cm^3) 387 cm^3 09/13/20 0800   Wound Healing % -4200 09/13/20 0800   Distance Tunneling (cm) 0 cm 09/10/20 0916   Tunneling Position ___ O'Clock 0 09/10/20 0916   Undermining Starts ___ O'Clock 0 09/10/20 0916   Undermining Ends___ O'Clock 0 09/10/20 0916   Undermining Maxium Distance (cm) 0 09/10/20 0916   Wound Assessment Yellow; Red 09/13/20 1600   Drainage Amount Large 09/13/20 1600   Drainage Description Purulent; Foul purulent 09/13/20 1600   Odor Strong 09/13/20 1600   Margins Defined edges 09/13/20 1600   Exposed structure Bone 09/13/20 1600   Kecia-wound Assessment White 09/13/20 1600   Non-staged Wound Description Full thickness 09/12/20 0800   [place]%Wound Bed 30 09/08/20 0824   Red%Wound Bed 70 09/10/20 0916   Yellow%Wound Bed 30 09/10/20 0916   Black%Wound Bed 20 09/08/20 0824   Purple%Wound Bed 20 09/08/20 0824   Other%Wound Bed 20 09/03/20 0849   Number of days: 35        Elimination:  Continence:   · Bowel: No  · Bladder: No  Urinary Catheter:  Insertion Date: 09/13/2020   Colostomy/Ileostomy/Ileal Conduit: Yes  Colostomy LLQ-Stomal Appliance: 2 piece  Colostomy LLQ-Stoma  Assessment: Red, Protrudes  Colostomy LLQ-Peristomal Assessment: Clean, Intact  Colostomy LLQ-Treatment: Bag change(ostomy came off, changed new site)  Colostomy LLQ-Stool Appearance: Watery  Colostomy LLQ-Stool Color: Black, Brown  Colostomy LLQ-Stool Amount: Small  Colostomy LLQ-Output (mL): 20 ml    Date of Last BM: 9/19/2020    Intake/Output Summary (Last 24 hours) at 9/14/2020 1351  Last data filed at 9/14/2020 0000  Gross per 24 hour   Intake 240 ml   Output 445 ml   Net -205 ml     I/O last 3 completed shifts: In: 1260 [P.O.:1260]  Out: 180 [Urine:600; Stool:45]    Safety Concerns: At Risk for Falls and Aspiration Risk    Impairments/Disabilities:      None      Patient's personal belongings (please select all that are sent with patient):  None    RN SIGNATURE:  Electronically signed by Marito Mendoza RN on 9/19/20 at 1:09 PM EDT    CASE MANAGEMENT/SOCIAL WORK SECTION    Inpatient Status Date: ***    Readmission Risk Assessment Score:  Readmission Risk              Risk of Unplanned Readmission:        22           Discharging to Facility/ Agency   · Name:   · Address:  · Phone:  · Fax:    Dialysis Facility (if applicable)   · Name:  · Address:  · Dialysis Schedule:  · Phone:  · Fax:    / signature: {Esignature:[id number]}    PHYSICIAN SECTION    Nutrition Therapy:  Current Nutrition Therapy:   - Oral Diet: DIET CARDIAC; Dysphagia Minced and Moist; Mildly Thick (Nectar)     Routes of Feeding: Oral  Liquids: Honey Thick Liquids  Daily Fluid Restriction: no  Last Modified Barium Swallow with Video (Video Swallowing Test): not done    Treatments at the Time of Hospital Discharge:   Respiratory Treatments: None  Oxygen Therapy:  is not on home oxygen therapy. Ventilator:    - No ventilator support    Rehab Therapies: Physical Therapy, Occupational Therapy, and Speech/Language Therapy  Weight Bearing Status/Restrictions: Per PT/OT  Other Medical Equipment (for information only, NOT a DME order):  Per PT/OT  Other Treatments:   Wound care: sacrum cleanse with NS duoderm to periwound stoma ring wound vac with black foam drape suction 125mmhg continuous change m-w-f.      Peristomal wound/blister cleanse with NS cut sacral border in half and place to area to protect and keep stoma appliance off of this area change every 3 days and prn. Ostomy Care:   Remove pouch and barrier. Cleanse stoma and peristomal skin with warm water. Dry thoroughly. Apply stoma powder prn to any reddened peristomal skin. Measure stoma and cut barrier opening to fit close around base of stoma. And avoid skin/tear blister area to peristomal Apply coloplast barrier # [id number]  , size 2 11/16\"   and pouch # [id number] . Empty pouch when 1/3 to 1/2 full. Change every 3-7 days and prn. Apply skin prep to heels BID. Keep pressure off of heels. Pt is at high risk for skin breakdown AEB Boone. Follow Boone orders. Wound care to change wound vac dressing on Monday 9/21/20. Electronically signed by Karol Cheng RN on 9/18/2020 at 10:31 AM     Prognosis: Guarded    Condition at Discharge: Stable    Rehab Potential (if transferring to Rehab): Fair    Recommended Labs or Other Treatments After Discharge: CBC in 1 week    Physician Certification: I certify the above information and transfer of Shanda Kumar  is necessary for the continuing treatment of the diagnosis listed and that he requires [facility] for greater 30 days.      Update Admission H&P: Changes in H&P as follows - Please see SHIN krishna    PHYSICIAN SIGNATURE:  Electronically signed by Leeanne Lai MD on 9/19/20 at 11:53 AM EDT

## 2020-09-15 LAB
BASOPHILS ABSOLUTE: 0 K/CU MM
BASOPHILS RELATIVE PERCENT: 0.3 % (ref 0–1)
C-REACTIVE PROTEIN, HIGH SENSITIVITY: 58.5 MG/L
DIFFERENTIAL TYPE: ABNORMAL
EOSINOPHILS ABSOLUTE: 0.2 K/CU MM
EOSINOPHILS RELATIVE PERCENT: 2.6 % (ref 0–3)
HCT VFR BLD CALC: 30.6 % (ref 42–52)
HEMOGLOBIN: 8.8 GM/DL (ref 13.5–18)
IMMATURE NEUTROPHIL %: 1.1 % (ref 0–0.43)
LYMPHOCYTES ABSOLUTE: 1.7 K/CU MM
LYMPHOCYTES RELATIVE PERCENT: 22.8 % (ref 24–44)
MCH RBC QN AUTO: 26 PG (ref 27–31)
MCHC RBC AUTO-ENTMCNC: 28.8 % (ref 32–36)
MCV RBC AUTO: 90.5 FL (ref 78–100)
MONOCYTES ABSOLUTE: 1 K/CU MM
MONOCYTES RELATIVE PERCENT: 12.9 % (ref 0–4)
NUCLEATED RBC %: 0 %
PDW BLD-RTO: 15.2 % (ref 11.7–14.9)
PLATELET # BLD: 215 K/CU MM (ref 140–440)
PMV BLD AUTO: 10.7 FL (ref 7.5–11.1)
PROCALCITONIN: 0.12
RBC # BLD: 3.38 M/CU MM (ref 4.6–6.2)
SEGMENTED NEUTROPHILS ABSOLUTE COUNT: 4.5 K/CU MM
SEGMENTED NEUTROPHILS RELATIVE PERCENT: 60.3 % (ref 36–66)
TOTAL IMMATURE NEUTOROPHIL: 0.08 K/CU MM
TOTAL NUCLEATED RBC: 0 K/CU MM
WBC # BLD: 7.4 K/CU MM (ref 4–10.5)

## 2020-09-15 PROCEDURE — 94150 VITAL CAPACITY TEST: CPT

## 2020-09-15 PROCEDURE — 6370000000 HC RX 637 (ALT 250 FOR IP)

## 2020-09-15 PROCEDURE — 85025 COMPLETE CBC W/AUTO DIFF WBC: CPT

## 2020-09-15 PROCEDURE — 2580000003 HC RX 258

## 2020-09-15 PROCEDURE — 97606 NEG PRS WND THER DME>50 SQCM: CPT

## 2020-09-15 PROCEDURE — 99231 SBSQ HOSP IP/OBS SF/LOW 25: CPT | Performed by: INTERNAL MEDICINE

## 2020-09-15 PROCEDURE — U0002 COVID-19 LAB TEST NON-CDC: HCPCS

## 2020-09-15 PROCEDURE — 92526 ORAL FUNCTION THERAPY: CPT

## 2020-09-15 PROCEDURE — 86140 C-REACTIVE PROTEIN: CPT

## 2020-09-15 PROCEDURE — 6360000002 HC RX W HCPCS: Performed by: HOSPITALIST

## 2020-09-15 PROCEDURE — 94761 N-INVAS EAR/PLS OXIMETRY MLT: CPT

## 2020-09-15 PROCEDURE — 84145 PROCALCITONIN (PCT): CPT

## 2020-09-15 PROCEDURE — 1200000000 HC SEMI PRIVATE

## 2020-09-15 PROCEDURE — 99024 POSTOP FOLLOW-UP VISIT: CPT | Performed by: SURGERY

## 2020-09-15 RX ADMIN — FERROUS SULFATE TAB 325 MG (65 MG ELEMENTAL FE) 325 MG: 325 (65 FE) TAB at 18:06

## 2020-09-15 RX ADMIN — METOPROLOL SUCCINATE 25 MG: 25 TABLET, EXTENDED RELEASE ORAL at 09:56

## 2020-09-15 RX ADMIN — PANTOPRAZOLE SODIUM 40 MG: 40 GRANULE, DELAYED RELEASE ORAL at 18:06

## 2020-09-15 RX ADMIN — SODIUM CHLORIDE, PRESERVATIVE FREE 10 ML: 5 INJECTION INTRAVENOUS at 21:28

## 2020-09-15 RX ADMIN — SODIUM CHLORIDE, PRESERVATIVE FREE 10 ML: 5 INJECTION INTRAVENOUS at 09:56

## 2020-09-15 RX ADMIN — PANTOPRAZOLE SODIUM 40 MG: 40 GRANULE, DELAYED RELEASE ORAL at 06:31

## 2020-09-15 RX ADMIN — FERROUS SULFATE TAB 325 MG (65 MG ELEMENTAL FE) 325 MG: 325 (65 FE) TAB at 09:56

## 2020-09-15 RX ADMIN — ENOXAPARIN SODIUM 30 MG: 30 INJECTION SUBCUTANEOUS at 09:56

## 2020-09-15 RX ADMIN — QUETIAPINE FUMARATE 100 MG: 100 TABLET ORAL at 21:28

## 2020-09-15 RX ADMIN — ENOXAPARIN SODIUM 30 MG: 30 INJECTION SUBCUTANEOUS at 21:28

## 2020-09-15 RX ADMIN — QUETIAPINE FUMARATE 100 MG: 100 TABLET ORAL at 09:56

## 2020-09-15 RX ADMIN — MIRTAZAPINE 45 MG: 15 TABLET, FILM COATED ORAL at 21:28

## 2020-09-15 ASSESSMENT — PAIN SCALES - GENERAL
PAINLEVEL_OUTOF10: 0
PAINLEVEL_OUTOF10: 0

## 2020-09-15 NOTE — CONSULTS
[facility] Continence Nurse  Consult Note       John Mei  AGE: 80 y.o. GENDER: male  : 1932  TODAY'S DATE:  9/15/2020    Subjective:     Reason for  Evaluation and Assessment:  Sacral wound placed wound vac       John Mei is a 80 y.o. male referred by:   [x] Physician  [] Nursing  [] Other:     Wound Identification:  Wound Type: pressure  Contributing Factors: chronic pressure, decreased mobility and malnutrition        PAST MEDICAL HISTORY        Diagnosis Date    Anxiety     Depression     Hypertension     Nerves        PAST SURGICAL HISTORY    Past Surgical History:   Procedure Laterality Date    JOINT REPLACEMENT      right hip    LAPAROSCOPY N/A 9/10/2020    LAPAROSCOPY FOR DIVERTING COLOSTOMY performed by Jani Tesfaye MD at [facility]. N/A 2020    EGD BIOPSY performed by Colten Meeks MD at [address] [facility]    Family History   Problem Relation Age of Onset    Cancer Mother     Cancer Father     Cancer Sister        SOCIAL HISTORY    Social History     Tobacco Use    Smoking status: Former Smoker     Last attempt to quit: 1974     Years since quittin.8    Smokeless tobacco: Current User     Types: Chew   Substance Use Topics    Alcohol use: No     Comment: used to [employer] LTDk on weekends stopped 2 years ago    Drug use: No       ALLERGIES    No Known Allergies    MEDICATIONS    No current facility-administered medications on file prior to encounter. Current Outpatient Medications on File Prior to Encounter   Medication Sig Dispense Refill    pantoprazole (PROTONIX) 40 MG tablet Take 1 tablet by mouth 2 times daily (before meals) 30 tablet 3    furosemide (LASIX) 40 MG tablet Take 1 tablet by mouth daily for 3 days 3 tablet 0    cloNIDine (CATAPRES) 0.1 MG tablet Take 1 tablet by mouth every 6 hours as needed (for B/P systolic > 495).  60 tablet 3    QUEtiapine (SEROQUEL) 100 MG tablet Take 100 mg by mouth 2 times daily.  metoprolol (TOPROL-XL) 25 MG XL tablet Take 25 mg by mouth daily.  mirtazapine (REMERON) 45 MG tablet Take 45 mg by mouth nightly.              Objective:      /65   Pulse 96   Temp 98.6 °F (37 °C) (Oral)   Resp 20   Ht 5' 7.99\" (1.727 m)   Wt 152 lb 5.4 oz (69.1 kg)   SpO2 96%   BMI 23.17 kg/m²   Boone Risk Score: Boone Scale Score: 11    LABS    CBC:   Lab Results   Component Value Date    WBC 7.4 09/15/2020    RBC 3.38 09/15/2020    HGB 8.8 09/15/2020    HCT 30.6 09/15/2020    MCV 90.5 09/15/2020    MCH 26.0 09/15/2020    MCHC 28.8 09/15/2020    RDW 15.2 09/15/2020     09/15/2020    MPV 10.7 09/15/2020     CMP:    Lab Results   Component Value Date     09/10/2020    K 5.0 09/10/2020     09/10/2020    CO2 26 09/10/2020    BUN 32 09/10/2020    CREATININE 1.4 09/10/2020    GFRAA 58 09/10/2020    LABGLOM 48 09/10/2020    GLUCOSE 125 09/10/2020    PROT 5.4 08/22/2020    PROT 6.4 10/24/2012    LABALBU 2.6 08/22/2020    CALCIUM 8.3 09/10/2020    BILITOT 0.5 08/22/2020    ALKPHOS 241 08/22/2020    AST 88 08/22/2020     08/22/2020     Albumin:    Lab Results   Component Value Date    LABALBU 2.6 08/22/2020     PT/INR:    Lab Results   Component Value Date    PROTIME 13.9 08/04/2020    INR 1.15 08/04/2020     HgBA1c:    Lab Results   Component Value Date    LABA1C 5.3 06/05/2013         Assessment:     Patient Active Problem List   Diagnosis    Pneumonia due to organism    [facility]    Essential hypertension, benign    Depressive disorder, not elsewhere classified    Acute renal failure ([facility]    Metabolic encephalopathy    SOB (shortness of breath)    Elevated liver enzymes    Acute respiratory failure (HCC)    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 ([address]    Pneumonia due to COVID-19 virus    Osteomyelitis of sacrum (HCC)    Moderate malnutrition ([address]       Measurements:  Wound 08/10/20 Sacrum and buttock (Active)   Wound Pressure Stage  4 09/15/20 0914   Dressing Status Changed 09/15/20 0914   Dressing Changed Changed/New 09/15/20 0914   Dressing/Treatment Vacuum dressing 09/15/20 0914   Wound Cleansed Rinsed/Irrigated with saline 09/15/20 0914   Dressing Change Due 09/15/20 09/14/20 1541   Wound Length (cm) 10 cm 09/15/20 0914   Wound Width (cm) 9.5 cm 09/15/20 0914   Wound Depth (cm) 3.5 cm 09/15/20 0914   Wound Surface Area (cm^2) 95 cm^2 09/15/20 0914   Change in Wound Size % (l*w) -5.56 09/15/20 0914   Wound Volume (cm^3) 332.5 cm^3 09/15/20 0914   Wound Healing % -3594 09/15/20 0914   Distance Tunneling (cm) 0 cm 09/15/20 0914   Tunneling Position ___ O'Clock 0 09/15/20 0914   Undermining Starts ___ O'Clock 0 09/15/20 0914   Undermining Ends___ O'Clock 0 09/15/20 0914   Undermining Maxium Distance (cm) 0 09/15/20 0914   Wound Assessment Pink;Red;Yellow 09/15/20 0914   Drainage Amount Moderate 09/15/20 0914   Drainage Description Serosanguinous 09/15/20 0914   Odor None 09/15/20 0914   Margins Defined edges 09/15/20 0914   Exposed structure Bone 09/14/20 1541   Linda-wound Assessment Pink 09/15/20 0914   Non-staged Wound Description Full thickness 09/15/20 0914   [place]%Wound Bed 40 09/15/20 0914   Red%Wound Bed 40 09/15/20 0914   Yellow%Wound Bed 20 09/15/20 0914   Black%Wound Bed 0 09/15/20 0914   Purple%Wound Bed 0 09/15/20 0914   Other%Wound Bed 0 09/15/20 0914   Number of days: 36       Wound 09/15/20 Abdomen Left; Lower;Medial linda stoma (Active)   Wound Skin Tear 09/15/20 0943   Dressing Status Changed 09/15/20 0943   Dressing Changed Changed/New 09/15/20 0943   Wound Cleansed Rinsed/Irrigated with saline 09/15/20 0943   Wound Length (cm) 3.5 cm 09/15/20 0943   Wound Width (cm) 3 cm 09/15/20 0943   Wound Depth (cm) 0.1 cm 09/15/20 0943   Wound Surface Area (cm^2) 10.5 cm^2 09/15/20 0943   Wound Volume (cm^3) 1.05 cm^3 09/15/20 0943   Distance Tunneling (cm) 0 cm 09/15/20 5375   Tunneling Position ___ O'Clock 0 09/15/20 0943   Undermining Starts ___ O'Clock 00 09/15/20 0943   Undermining Ends___ O'Clock 0 09/15/20 0943   Undermining Maxium Distance (cm) 0 09/15/20 0943   Wound Assessment Red;Pink;Purple 09/15/20 0943   Drainage Amount Small 09/15/20 0943   Drainage Description Serous 09/15/20 0943   Odor None 09/15/20 0943   Margins Defined edges 09/15/20 0943   Linda-wound Assessment Pink 09/15/20 0943   Non-staged Wound Description Partial thickness 09/15/20 0943   [place]%Wound Bed 40 09/15/20 0943   Red%Wound Bed 40 09/15/20 0943   Yellow%Wound Bed 0 09/15/20 0943   Black%Wound Bed 0 09/15/20 0943   Purple%Wound Bed 20 09/15/20 0943   Other%Wound Bed 0 09/15/20 0943   Number of days: 0       Response to treatment:  With complaints of pain. Pain Assessment:  Severity:  7  Quality of pain: sore  Wound Pain Timing/Severity: dressing change   Premedicated: no    Plan:     Plan of Care: Wound 08/10/20 Sacrum and buttock-Dressing/Treatment: Vacuum dressing(benzoin, duoderm, black foam x 6)  [REMOVED] Wound 08/21/20 Left lateral abdomen-Dressing/Treatment: Open to air  Incision 09/10/20 Abdomen-Dressing/Treatment: Open to air  Wound 09/15/20 Abdomen Left; Lower;Medial linda stoma-Dressing/Treatment: (foam border)     Pt in bed agreeable to wound care. Dr brasher here to eval sacral wound dressing removed cleansed with NS measured and pictured applied wound vac as above x 6 black foam 3 inside and 3 to bridge bridged to left hip. Adequate seal obtained. Heels intact. Pt has new stoma was leaking removed appliance cleansed with warm water and soap applied new stoma appliance coloplast barrier # [id number] bag # [id number] avoided area of skin tear to peristomal area applied half of sacral border to protect and measured and pictured new area. Wound care to change vac on Friday 9/18/20.     Specialty Bed Required :  yes  [x] Low Air Loss   [] Pressure Redistribution  [] Fluid Immersion  [] Bariatric  [] Total Pressure Relief  [] Other:     Discharge Plan:  Placement for patient upon discharge:  tbd  Hospice Care: no  Patient appropriate for Outpatient [address]:  [facility]    Patient/Caregiver Teaching:  Level of patient/caregiver understanding able to:  Cares explained as given       Electronically signed by Nadeen Harmon RN,  on 9/15/2020 at 11:59 AM

## 2020-09-15 NOTE — PROGRESS NOTES
GENERAL SURGERY PROGRESS NOTE    Marie Manley is a 80 y.o. male s/p sacral ulcer debridement on 8/23 and diverting colostomy on 9/10. Subjective:  Doing ok this morning. Sacral wound seen and evaluated with the Wound Care team.    Objective:    Vitals: VITALS:  /74   Pulse 91   Temp 98.5 °F (36.9 °C) (Oral)   Resp 24   Ht 5' 7.99\" (1.727 m)   Wt 152 lb 5.4 oz (69.1 kg)   SpO2 94%   BMI 23.17 kg/m²     I/O: 09/14 0701 - 09/15 0700  In: 120 [P.O.:120]  Out: 1150 [Urine:900]    Labs/Imaging Results:   Lab Results   Component Value Date     09/10/2020    K 5.0 09/10/2020     09/10/2020    CO2 26 09/10/2020    BUN 32 09/10/2020    CREATININE 1.4 09/10/2020    GLUCOSE 125 09/10/2020    CALCIUM 8.3 09/10/2020      Lab Results   Component Value Date    WBC 7.4 09/15/2020    HGB 8.8 (L) 09/15/2020    HCT 30.6 (L) 09/15/2020    MCV 90.5 09/15/2020     09/15/2020       Scheduled Meds:   pantoprazole sodium, 40 mg, Oral, BID AC    collagenase, , Topical, BID    enoxaparin, 30 mg, Subcutaneous, BID    lidocaine PF, 5 mL, Intradermal, Once    sodium chloride flush, 10 mL, Intravenous, 2 times per day    ferrous sulfate, 325 mg, Oral, BID WC    sodium chloride flush, 10 mL, Intravenous, 2 times per day    metoprolol succinate, 25 mg, Oral, Daily    mirtazapine, 45 mg, Oral, Nightly    QUEtiapine, 100 mg, Oral, BID    Physical Exam:  General: Alert and awake, interactive, no distress. HEENT: Anicteric sclerae, MMM. Extremities: No edema bilat LE. Buttock: Large sacral ulcer extending to the level of bone with wound is , 90% granulated with mild yellow slough. Abdomen: Soft, appropriately tender, LLQ colostomy pink and viable with stool output      Assessment and Plan:  80 y.o. male s/p debridement of sacral ulcer. Covid positive. Wound was changed with Wound Care team today and wound VAC was applied.      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  *        - d/c santyl and gauze dressings  - continue with wound VAC therapy, next change planned for friday    Keisha Hassan MD  9/15/2020  9:44 AM

## 2020-09-15 NOTE — PROGRESS NOTES
Infectious Disease Progress Note  9/15/2020   Patient Name: Darien Stone : 1932       Reason for visit: F/u COVID-19, stage IV sacral decubitus ulcer ? History:? Interval history noted, s/p diverting colostomy on 9/10, to aid healing of a sacral wound  Dementia, denies any complaints. Afebrile  Physical Exam:  Vital Signs: /65   Pulse 96   Temp 98.6 °F (37 °C) (Oral)   Resp 20   Ht 5' 7.99\" (1.727 m)   Wt 152 lb 5.4 oz (69.1 kg)   SpO2 96%   BMI 23.17 kg/m²     Gen: alert and oriented, memory deficits  Skin: no stigmata of endocarditis  Wounds: Stage IV sacral decubitus ulcer, with areas of necrosis   HEMT: AT/NC Oropharynx pink, moist, and without lesions or exudates; dentition in good state of repair  Eyes: PERRLA, EOMI, conjunctiva pink, sclera anicteric. Neck: Supple. Trachea midline. No LAD. Chest: no distress and CTA. Good air movement. Heart: RRR and no MRG. Abd: LLQ colostomy, soft, non-distended, no tenderness, no hepatomegaly. Normoactive bowel sounds. Ext: no clubbing, cyanosis, or edema  Catheter Site: without erythema or tenderness  Neuro: Mental status intact. CN 2-12 intact and no focal sensory or motor deficits     Radiologic / Imaging / TESTING  No results found.      Labs:    Recent Results (from the past 24 hour(s))   High sensitivity CRP    Collection Time: 09/15/20  4:50 AM   Result Value Ref Range    CRP High Sensitivity 58.5 (H) <8.5 mg/L   CBC Auto Differential    Collection Time: 09/15/20  4:50 AM   Result Value Ref Range    WBC 7.4 4.0 - 10.5 K/CU MM    RBC 3.38 (L) 4.6 - 6.2 M/CU MM    Hemoglobin 8.8 (L) 13.5 - 18.0 GM/DL    Hematocrit 30.6 (L) 42 - 52 %    MCV 90.5 78 - 100 FL    MCH 26.0 (L) 27 - 31 PG    MCHC 28.8 (L) 32.0 - 36.0 %    RDW 15.2 (H) 11.7 - 14.9 %    Platelets 429 159 - 758 K/CU MM    MPV 10.7 7.5 - 11.1 FL    Differential Type AUTOMATED DIFFERENTIAL     Segs Relative 60.3 36 - 66 %    Lymphocytes % 22.8 (L) 24 - 44 %    Monocytes % 12.9 (H) 0 - 4 %    Eosinophils % 2.6 0 - 3 %    Basophils % 0.3 0 - 1 %    Segs Absolute 4.5 K/CU MM    Lymphocytes Absolute 1.7 K/CU MM    Monocytes Absolute 1.0 K/CU MM    Eosinophils Absolute 0.2 K/CU MM    Basophils Absolute 0.0 K/CU MM    Nucleated RBC % 0.0 %    Total Nucleated RBC 0.0 K/CU MM    Total Immature Neutrophil 0.08 K/CU MM    Immature Neutrophil % 1.1 (H) 0 - 0.43 %     CULTURE results: Invalid input(s): BLOOD CULTURE,  URINE CULTURE, SURGICAL CULTURE    Diagnosis:  Patient Active Problem List   Diagnosis    Pneumonia due to organism    [facility]acute [facility] failure) [facility]    Essential hypertension, benign    Depressive disorder, not elsewhere classified    Acute renal failure ([address]    Metabolic encephalopathy    SOB (shortness of breath)    Elevated liver enzymes    Acute respiratory failure (HCC)    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 ([address]    Pneumonia due to COVID-19 virus    Osteomyelitis of sacrum (HCC)    Moderate malnutrition (HCC)       Active Problems  Active Problems:    Acute respiratory failure (HCC)    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 *      Impression and plan   Clinical status: stable, off oxygen   Therapeutic:   Diagnostic:    F/u: 9/1-blood cx ngtd    Other:  Summary: afebrile,  Repeat SARS-CoV-2 test positive on 8/21 and 8/29. SARS-CoV-2 PCR testing remains repeatedly positive. 9/9/2020 was positive. Not unusual in elderly men.s/p diverting colostomy on 9/10 to help healing of stage III sacral ulcer. Discharge planning.      Electronically signed by: Electronically signed by Kvng Hamilton MD on 9/15/2020 at 10:29 AM

## 2020-09-15 NOTE — PROGRESS NOTES
Hospitalist Progress Note      Name:  Luz Maria Webb /Age/Sex: 1932  (80 y.o. male)   MRN & CSN:  [id number] & [id number] Admission Date/Time: 2020  1:17 AM   Location:  -A PCP: Vivien Alamo MD         Hospital Day:     History of Present Illness:     Chief Complaint: Luz Maria Webb is a 80 y.o.  male  who presents with hypoxia and shortness of breath     The patient seen and examined at bedside. He denies having any chest pain or shortness of breath. He says his breathing is better now. Ten point ROS reviewed negative, unless as noted above    Objective: Intake/Output Summary (Last 24 hours) at 9/15/2020 1607  Last data filed at 9/15/2020 0900  Gross per 24 hour   Intake 240 ml   Output 1150 ml   Net -910 ml      Vitals:   Vitals:    09/15/20 0900   BP: 106/65   Pulse: 96   Resp: 20   Temp: 98.6 °F (37 °C)   SpO2: 96%     Physical Exam:   General Appearance: alert and oriented to person, place and time, in no acute distress  Cardiovascular: normal rate, regular rhythm, normal S1 and S2  Pulmonary/Chest: Decreased breath sounds bilaterally  Abdomen: soft, non-tender, non-distended, normal bowel sounds, no masses.   Colostomy  Extremities: no cyanosis, clubbing or edema, pulse   Skin: Ulcers as described  Head: normocephalic and atraumatic  Eyes: pupils equal, round, and reactive to light  Neck: supple and non-tender without mass, no thyromegaly   Musculoskeletal: normal range of motion, no joint swelling, deformity or tenderness  Neurological: alert, oriented, normal speech, no focal findings or movement disorder noted    Medications:   Medications:    pantoprazole sodium  40 mg Oral BID AC    enoxaparin  30 mg Subcutaneous BID    lidocaine PF  5 mL Intradermal Once    sodium chloride flush  10 mL Intravenous 2 times per day    ferrous sulfate  325 mg Oral BID WC    sodium chloride flush  10 mL Intravenous 2 times per day    metoprolol succinate  25 mg Oral Daily Unknown FINDINGS: PERFUSION: Mildly heterogeneous distribution of radiotracer. No segmental or subsegmental perfusion defects. CHEST RADIOGRAPH: Mild bibasilar atelectasis. Very low probability for pulmonary embolism. Xr Chest Portable    Result Date: 8/22/2020  EXAMINATION: ONE XRAY VIEW OF THE CHEST 8/22/2020 4:25 pm COMPARISON: 08/21/2020 radiograph HISTORY: ORDERING SYSTEM PROVIDED HISTORY: fup shortness of breath TECHNOLOGIST PROVIDED HISTORY: Reason for exam:->fup shortness of breath Reason for Exam: fup shortness of breath Acuity: Unknown Type of Exam: Subsequent/Follow-up Additional signs and symptoms: none Relevant Medical/Surgical History: none FINDINGS: Borderline cardiomegaly. Mediastinum is normal.  Mild perihilar opacities are slightly increased centrally and there is a new bandlike opacity in the left lower lung zone. Otherwise, mild ground-glass opacities are stable bilaterally in the lung bases. No skeletal finding. Increasing perihilar opacities and new bandlike opacity in the left lower lobe. Pattern may represent asymmetric edema or developing pneumonitis. Xr Chest Portable    Result Date: 8/21/2020  EXAMINATION: ONE XRAY VIEW OF THE CHEST 8/21/2020 1:29 am COMPARISON: Chest single view August 8, 2020 HISTORY: ORDERING SYSTEM PROVIDED HISTORY: hypoxia TECHNOLOGIST PROVIDED HISTORY: Reason for exam:->hypoxia Reason for Exam: hypoxia Acuity: Acute Type of Exam: Initial Mechanism of Injury: hypoxia Relevant Medical/Surgical History: hypoxia FINDINGS: The heart is normal in size and configuration. The mediastinal contours are within normal limits. Bibasilar mild subsegmental atelectasis is present. Lungs are otherwise well aerated. The pleural surfaces are normal and no evidence of a pleural effusion is seen. Bones and soft tissues are unremarkable. Mild bibasilar subsegmental atelectasis.  Otherwise, unremarkable single upright portable AP view of the chest. Assessment and Plan:   Idania Mai is a 80 y.o.  male  who presents with Shortness of breath    Sepsis secondary to pneumonia versus sacral decubitus ulcer  Decubitus ulcer status post d dilation colostomy  Acute hypoxic respiratory failure  COVID-19 pneumonia  COVID-19 test positive on 8/21, 8/29, 9/1 and 9/8  Repeat test pending  Blood cultures negative  ID recommendations appreciated, thinking chronic shedding of virus-currently off antibiotics  General surgery recommendations appreciated     Acute on chronic anemia  Continue monitor H&H    RONALDO on CKD stage III-resolved    Transaminitis-stable    Hypertension  Depression  COPD  Moderate PCM     Diet DIET CARDIAC;  Dysphagia Minced and Moist; Mildly Thick (Nectar)  Dietary Nutrition Supplements: Standard High Calorie Oral Supplement   DVT Prophylaxis [x] Lovenox, []  Heparin, [] SCDs, [] Ambulation   GI Prophylaxis [] PPI,  [] H2 Blocker,  [] Carafate,  [x] Diet/Tube Feeds   Code Status Limited   Disposition Pending placement   MDM [] Low, [x] Moderate,[]  High     Electronically signed by Yoly Apodaca MD on 9/15/2020 at 4:07 PM

## 2020-09-16 LAB
BASOPHILS ABSOLUTE: 0 K/CU MM
BASOPHILS RELATIVE PERCENT: 0.4 % (ref 0–1)
DIFFERENTIAL TYPE: ABNORMAL
EOSINOPHILS ABSOLUTE: 0.2 K/CU MM
EOSINOPHILS RELATIVE PERCENT: 1.9 % (ref 0–3)
HCT VFR BLD CALC: 28.2 % (ref 42–52)
HEMOGLOBIN: 8.3 GM/DL (ref 13.5–18)
IMMATURE NEUTROPHIL %: 0.8 % (ref 0–0.43)
LYMPHOCYTES ABSOLUTE: 1.7 K/CU MM
LYMPHOCYTES RELATIVE PERCENT: 16.9 % (ref 24–44)
MCH RBC QN AUTO: 26.1 PG (ref 27–31)
MCHC RBC AUTO-ENTMCNC: 29.4 % (ref 32–36)
MCV RBC AUTO: 88.7 FL (ref 78–100)
MONOCYTES ABSOLUTE: 1.1 K/CU MM
MONOCYTES RELATIVE PERCENT: 10.7 % (ref 0–4)
NUCLEATED RBC %: 0 %
PDW BLD-RTO: 15.2 % (ref 11.7–14.9)
PLATELET # BLD: 291 K/CU MM (ref 140–440)
PMV BLD AUTO: 9.8 FL (ref 7.5–11.1)
RBC # BLD: 3.18 M/CU MM (ref 4.6–6.2)
SEGMENTED NEUTROPHILS ABSOLUTE COUNT: 6.8 K/CU MM
SEGMENTED NEUTROPHILS RELATIVE PERCENT: 69.3 % (ref 36–66)
TOTAL IMMATURE NEUTOROPHIL: 0.08 K/CU MM
TOTAL NUCLEATED RBC: 0 K/CU MM
WBC # BLD: 9.8 K/CU MM (ref 4–10.5)

## 2020-09-16 PROCEDURE — 2580000003 HC RX 258

## 2020-09-16 PROCEDURE — 6370000000 HC RX 637 (ALT 250 FOR IP)

## 2020-09-16 PROCEDURE — 94761 N-INVAS EAR/PLS OXIMETRY MLT: CPT

## 2020-09-16 PROCEDURE — 85025 COMPLETE CBC W/AUTO DIFF WBC: CPT

## 2020-09-16 PROCEDURE — 1200000000 HC SEMI PRIVATE

## 2020-09-16 PROCEDURE — 6360000002 HC RX W HCPCS: Performed by: HOSPITALIST

## 2020-09-16 RX ADMIN — QUETIAPINE FUMARATE 100 MG: 100 TABLET ORAL at 21:14

## 2020-09-16 RX ADMIN — OXYCODONE HYDROCHLORIDE 5 MG: 5 TABLET ORAL at 08:44

## 2020-09-16 RX ADMIN — QUETIAPINE FUMARATE 100 MG: 100 TABLET ORAL at 08:45

## 2020-09-16 RX ADMIN — FERROUS SULFATE TAB 325 MG (65 MG ELEMENTAL FE) 325 MG: 325 (65 FE) TAB at 16:21

## 2020-09-16 RX ADMIN — SODIUM CHLORIDE, PRESERVATIVE FREE 10 ML: 5 INJECTION INTRAVENOUS at 08:44

## 2020-09-16 RX ADMIN — SODIUM CHLORIDE, PRESERVATIVE FREE 10 ML: 5 INJECTION INTRAVENOUS at 21:14

## 2020-09-16 RX ADMIN — FERROUS SULFATE TAB 325 MG (65 MG ELEMENTAL FE) 325 MG: 325 (65 FE) TAB at 08:45

## 2020-09-16 RX ADMIN — METOPROLOL SUCCINATE 25 MG: 25 TABLET, EXTENDED RELEASE ORAL at 08:45

## 2020-09-16 RX ADMIN — PANTOPRAZOLE SODIUM 40 MG: 40 GRANULE, DELAYED RELEASE ORAL at 16:22

## 2020-09-16 RX ADMIN — MIRTAZAPINE 45 MG: 15 TABLET, FILM COATED ORAL at 21:14

## 2020-09-16 RX ADMIN — PANTOPRAZOLE SODIUM 40 MG: 40 GRANULE, DELAYED RELEASE ORAL at 06:00

## 2020-09-16 RX ADMIN — ENOXAPARIN SODIUM 30 MG: 30 INJECTION SUBCUTANEOUS at 21:14

## 2020-09-16 RX ADMIN — ENOXAPARIN SODIUM 30 MG: 30 INJECTION SUBCUTANEOUS at 08:45

## 2020-09-16 RX ADMIN — SODIUM CHLORIDE, PRESERVATIVE FREE 10 ML: 5 INJECTION INTRAVENOUS at 21:15

## 2020-09-16 ASSESSMENT — PAIN SCALES - GENERAL
PAINLEVEL_OUTOF10: 0
PAINLEVEL_OUTOF10: 4
PAINLEVEL_OUTOF10: 0

## 2020-09-16 NOTE — PROGRESS NOTES
Hospitalist Progress Note      Name:  Francisco Saleh /Age/Sex: 1932  (80 y.o. male)   MRN & CSN:  [id number] & [id number] Admission Date/Time: 2020  1:17 AM   Location:  -A PCP: Dominique Castañeda MD         Hospital Day:     History of Present Illness:     Chief Complaint: Francisco Saleh is a 80 y.o.  male  who presents with hypoxia and shortness of breath     The patient seen and examined at bedside. He denies having any chest pain or shortness of breath. He says his breathing is better. Ten point ROS reviewed negative, unless as noted above    Objective: Intake/Output Summary (Last 24 hours) at 2020 1145  Last data filed at 2020 0843  Gross per 24 hour   Intake 960 ml   Output 1235 ml   Net -275 ml      Vitals:   Vitals:    20 0800   BP: 137/64   Pulse: 82   Resp: 23   Temp: 98.7 °F (37.1 °C)   SpO2: 98%     Physical Exam:   General Appearance: alert and oriented to person, place and time, in no acute distress  Cardiovascular: normal rate, regular rhythm, normal S1 and S2  Pulmonary/Chest: Decreased breath sounds bilaterally  Abdomen: soft, non-tender, non-distended, normal bowel sounds, no masses.   Colostomy  Extremities: no cyanosis, clubbing or edema, pulse   Skin: Ulcers as described  Head: normocephalic and atraumatic  Eyes: pupils equal, round, and reactive to light  Neck: supple and non-tender without mass, no thyromegaly   Musculoskeletal: normal range of motion, no joint swelling, deformity or tenderness  Neurological: alert, oriented, normal speech, no focal findings or movement disorder noted    Medications:   Medications:    pantoprazole sodium  40 mg Oral BID AC    enoxaparin  30 mg Subcutaneous BID    lidocaine PF  5 mL Intradermal Once    sodium chloride flush  10 mL Intravenous 2 times per day    ferrous sulfate  325 mg Oral BID WC    sodium chloride flush  10 mL Intravenous 2 times per day    metoprolol succinate  25 mg Oral Daily  mirtazapine  45 mg Oral Nightly    QUEtiapine  100 mg Oral BID      Infusions:   PRN Meds: oxyCODONE, 5 mg, Q6H PRN  sodium chloride flush, 10 mL, PRN  traMADol, 50 mg, Q6H PRN  sodium chloride flush, 10 mL, PRN  acetaminophen, 650 mg, Q6H PRN    Or  acetaminophen, 650 mg, Q6H PRN  polyethylene glycol, 17 g, Daily PRN  promethazine, 12.5 mg, Q6H PRN    Or  ondansetron, 4 mg, Q6H PRN            Pertinent New Labs & Imaging Studies     CBC with Differential:    Lab Results   Component Value Date    WBC 9.8 09/16/2020    RBC 3.18 09/16/2020    HGB 8.3 09/16/2020    HCT 28.2 09/16/2020     09/16/2020    MCV 88.7 09/16/2020    MCH 26.1 09/16/2020    MCHC 29.4 09/16/2020    RDW 15.2 09/16/2020    SEGSPCT 69.3 09/16/2020    BANDSPCT 4 08/02/2020    LYMPHOPCT 16.9 09/16/2020    MONOPCT 10.7 09/16/2020    EOSPCT 5.3 11/11/2011    BASOPCT 0.4 09/16/2020    MONOSABS 1.1 09/16/2020    LYMPHSABS 1.7 09/16/2020    EOSABS 0.2 09/16/2020    BASOSABS 0.0 09/16/2020    DIFFTYPE AUTOMATED DIFFERENTIAL 09/16/2020     CMP:    Lab Results   Component Value Date     09/10/2020    K 5.0 09/10/2020     09/10/2020    CO2 26 09/10/2020    BUN 32 09/10/2020    CREATININE 1.4 09/10/2020    GFRAA 58 09/10/2020    LABGLOM 48 09/10/2020    GLUCOSE 125 09/10/2020    PROT 5.4 08/22/2020    PROT 6.4 10/24/2012    LABALBU 2.6 08/22/2020    CALCIUM 8.3 09/10/2020    BILITOT 0.5 08/22/2020    ALKPHOS 241 08/22/2020    AST 88 08/22/2020     08/22/2020     Nm Lung Scan Perfusion Only    Result Date: 8/21/2020  EXAMINATION: NUCLEAR MEDICINE PERFUSION SCAN. 8/21/2020 TECHNIQUE: 5.6 millicuries of Tc 86Z MAA was administered intravenously prior to planar imaging of the lungs in multiple projections. COMPARISON: Chest radiograph 08/21/2020.  HISTORY: ORDERING SYSTEM PROVIDED HISTORY: positive D-Dimer TECHNOLOGIST PROVIDED HISTORY: Reason for exam:->positive D-Dimer Reason for Exam: elevated d dimer Acuity: Unknown Type of Exam: Unknown FINDINGS: PERFUSION: Mildly heterogeneous distribution of radiotracer. No segmental or subsegmental perfusion defects. CHEST RADIOGRAPH: Mild bibasilar atelectasis. Very low probability for pulmonary embolism. Xr Chest Portable    Result Date: 8/22/2020  EXAMINATION: ONE XRAY VIEW OF THE CHEST 8/22/2020 4:25 pm COMPARISON: 08/21/2020 radiograph HISTORY: ORDERING SYSTEM PROVIDED HISTORY: fup shortness of breath TECHNOLOGIST PROVIDED HISTORY: Reason for exam:->fup shortness of breath Reason for Exam: fup shortness of breath Acuity: Unknown Type of Exam: Subsequent/Follow-up Additional signs and symptoms: none Relevant Medical/Surgical History: none FINDINGS: Borderline cardiomegaly. Mediastinum is normal.  Mild perihilar opacities are slightly increased centrally and there is a new bandlike opacity in the left lower lung zone. Otherwise, mild ground-glass opacities are stable bilaterally in the lung bases. No skeletal finding. Increasing perihilar opacities and new bandlike opacity in the left lower lobe. Pattern may represent asymmetric edema or developing pneumonitis. Xr Chest Portable    Result Date: 8/21/2020  EXAMINATION: ONE XRAY VIEW OF THE CHEST 8/21/2020 1:29 am COMPARISON: Chest single view August 8, 2020 HISTORY: ORDERING SYSTEM PROVIDED HISTORY: hypoxia TECHNOLOGIST PROVIDED HISTORY: Reason for exam:->hypoxia Reason for Exam: hypoxia Acuity: Acute Type of Exam: Initial Mechanism of Injury: hypoxia Relevant Medical/Surgical History: hypoxia FINDINGS: The heart is normal in size and configuration. The mediastinal contours are within normal limits. Bibasilar mild subsegmental atelectasis is present. Lungs are otherwise well aerated. The pleural surfaces are normal and no evidence of a pleural effusion is seen. Bones and soft tissues are unremarkable. Mild bibasilar subsegmental atelectasis.  Otherwise, unremarkable single upright portable AP view of the chest. Assessment and Plan:   Bao Parker is a 80 y.o.  male  who presents with Shortness of breath    Sepsis secondary to pneumonia versus sacral decubitus ulcer  Decubitus ulcer status post d dilation colostomy  Acute hypoxic respiratory failure  COVID-19 pneumonia  COVID-19 test positive on 8/21, 8/29, 9/1 and 9/8  Repeat test pending  Blood cultures negative  ID recommendations appreciated, thinking chronic shedding of virus-currently off antibiotics  General surgery recommendations appreciated     Acute on chronic anemia  Continue monitor H&H    RONALDO on CKD stage III-resolved    Transaminitis-stable    Hypertension  Depression  COPD  Moderate PCM     Discussed with the case management regarding disposition plan. Diet DIET CARDIAC;  Dysphagia Minced and Moist; Mildly Thick (Nectar)  Dietary Nutrition Supplements: Standard High Calorie Oral Supplement   DVT Prophylaxis [x] Lovenox, []  Heparin, [] SCDs, [] Ambulation   GI Prophylaxis [] PPI,  [] H2 Blocker,  [] Carafate,  [x] Diet/Tube Feeds   Code Status Limited   Disposition Pending placement   MDM [] Low, [x] Moderate,[]  High     Electronically signed by Curtis Nolan MD on 9/16/2020 at 11:45 AM

## 2020-09-16 NOTE — CARE COORDINATION
Received return VM from Catei Guerrero in Admissions at [address] [place] [address]. She stated that she is sending clinical paperwork to her Corporate nurse at [address] [place] [address] to review and will return call to let  know when patient can admit. Patient needs precert with [facility] Medicare . Clarified with therapy that they have already done evals on patient on 8/29/20 so once approved to return to SNF will need updated treatment notes from therapy for precert.

## 2020-09-16 NOTE — CARE COORDINATION
Phoned Sussy Danielson in Admissions at [facility] to see if patient is able to return now and had to leave a .

## 2020-09-17 LAB
SARS-COV-2: NORMAL
SOURCE: NORMAL

## 2020-09-17 PROCEDURE — 6370000000 HC RX 637 (ALT 250 FOR IP)

## 2020-09-17 PROCEDURE — 6360000002 HC RX W HCPCS: Performed by: HOSPITALIST

## 2020-09-17 PROCEDURE — 94150 VITAL CAPACITY TEST: CPT

## 2020-09-17 PROCEDURE — 94761 N-INVAS EAR/PLS OXIMETRY MLT: CPT

## 2020-09-17 PROCEDURE — 1200000000 HC SEMI PRIVATE

## 2020-09-17 RX ADMIN — METOPROLOL SUCCINATE 25 MG: 25 TABLET, EXTENDED RELEASE ORAL at 09:28

## 2020-09-17 RX ADMIN — QUETIAPINE FUMARATE 100 MG: 100 TABLET ORAL at 21:12

## 2020-09-17 RX ADMIN — ENOXAPARIN SODIUM 30 MG: 30 INJECTION SUBCUTANEOUS at 21:12

## 2020-09-17 RX ADMIN — PANTOPRAZOLE SODIUM 40 MG: 40 GRANULE, DELAYED RELEASE ORAL at 05:10

## 2020-09-17 RX ADMIN — QUETIAPINE FUMARATE 100 MG: 100 TABLET ORAL at 09:28

## 2020-09-17 RX ADMIN — ENOXAPARIN SODIUM 30 MG: 30 INJECTION SUBCUTANEOUS at 09:28

## 2020-09-17 RX ADMIN — PANTOPRAZOLE SODIUM 40 MG: 40 GRANULE, DELAYED RELEASE ORAL at 16:35

## 2020-09-17 RX ADMIN — MIRTAZAPINE 45 MG: 15 TABLET, FILM COATED ORAL at 21:12

## 2020-09-17 RX ADMIN — FERROUS SULFATE TAB 325 MG (65 MG ELEMENTAL FE) 325 MG: 325 (65 FE) TAB at 09:28

## 2020-09-17 RX ADMIN — FERROUS SULFATE TAB 325 MG (65 MG ELEMENTAL FE) 325 MG: 325 (65 FE) TAB at 16:35

## 2020-09-17 ASSESSMENT — PAIN SCALES - GENERAL: PAINLEVEL_OUTOF10: 0

## 2020-09-17 ASSESSMENT — PAIN SCALES - WONG BAKER: WONGBAKER_NUMERICALRESPONSE: 0

## 2020-09-17 NOTE — PROGRESS NOTES
Hospitalist Progress Note      Name:  Darien Stone /Age/Sex: 1932  (80 y.o. male)   MRN & CSN:  [id number] & [id number] Admission Date/Time: 2020  1:17 AM   Location:  -A PCP: Mable Jackson MD         Hospital Day:     History of Present Illness:     Chief Complaint: Darien Stone is a 80 y.o.  male  who presents with hypoxia and shortness of breath     The patient seen and examined at bedside. He denies having any chest pain or shortness of breath. He says his breathing is better. Ten point ROS reviewed negative, unless as noted above    Objective: Intake/Output Summary (Last 24 hours) at 2020 1238  Last data filed at 2020 0356  Gross per 24 hour   Intake 120 ml   Output 920 ml   Net -800 ml      Vitals:   Vitals:    20 0815   BP: 136/67   Pulse:    Resp:    Temp: 98.9 °F (37.2 °C)   SpO2: 100%     Physical Exam:   General Appearance: alert and oriented to person, place and time, in no acute distress  Cardiovascular: normal rate, regular rhythm, normal S1 and S2  Pulmonary/Chest: Decreased breath sounds bilaterally  Abdomen: soft, non-tender, non-distended, normal bowel sounds, no masses.   Colostomy  Extremities: no cyanosis, clubbing or edema, pulse   Skin: Ulcers as described  Head: normocephalic and atraumatic  Eyes: pupils equal, round, and reactive to light  Neck: supple and non-tender without mass, no thyromegaly   Musculoskeletal: normal range of motion, no joint swelling, deformity or tenderness  Neurological: alert, oriented, normal speech, no focal findings or movement disorder noted    Medications:   Medications:    pantoprazole sodium  40 mg Oral BID AC    enoxaparin  30 mg Subcutaneous BID    lidocaine PF  5 mL Intradermal Once    sodium chloride flush  10 mL Intravenous 2 times per day    ferrous sulfate  325 mg Oral BID WC    sodium chloride flush  10 mL Intravenous 2 times per day    metoprolol succinate  25 mg Oral Daily    mirtazapine  45 mg Oral Nightly    QUEtiapine  100 mg Oral BID      Infusions:   PRN Meds: oxyCODONE, 5 mg, Q6H PRN  sodium chloride flush, 10 mL, PRN  traMADol, 50 mg, Q6H PRN  sodium chloride flush, 10 mL, PRN  acetaminophen, 650 mg, Q6H PRN    Or  acetaminophen, 650 mg, Q6H PRN  polyethylene glycol, 17 g, Daily PRN  promethazine, 12.5 mg, Q6H PRN    Or  ondansetron, 4 mg, Q6H PRN            Pertinent New Labs & Imaging Studies     CBC with Differential:    Lab Results   Component Value Date    WBC 9.8 09/16/2020    RBC 3.18 09/16/2020    HGB 8.3 09/16/2020    HCT 28.2 09/16/2020     09/16/2020    MCV 88.7 09/16/2020    MCH 26.1 09/16/2020    MCHC 29.4 09/16/2020    RDW 15.2 09/16/2020    SEGSPCT 69.3 09/16/2020    BANDSPCT 4 08/02/2020    LYMPHOPCT 16.9 09/16/2020    MONOPCT 10.7 09/16/2020    EOSPCT 5.3 11/11/2011    BASOPCT 0.4 09/16/2020    MONOSABS 1.1 09/16/2020    LYMPHSABS 1.7 09/16/2020    EOSABS 0.2 09/16/2020    BASOSABS 0.0 09/16/2020    DIFFTYPE AUTOMATED DIFFERENTIAL 09/16/2020     CMP:    Lab Results   Component Value Date     09/10/2020    K 5.0 09/10/2020     09/10/2020    CO2 26 09/10/2020    BUN 32 09/10/2020    CREATININE 1.4 09/10/2020    GFRAA 58 09/10/2020    LABGLOM 48 09/10/2020    GLUCOSE 125 09/10/2020    PROT 5.4 08/22/2020    PROT 6.4 10/24/2012    LABALBU 2.6 08/22/2020    CALCIUM 8.3 09/10/2020    BILITOT 0.5 08/22/2020    ALKPHOS 241 08/22/2020    AST 88 08/22/2020     08/22/2020     Nm Lung Scan Perfusion Only    Result Date: 8/21/2020  EXAMINATION: NUCLEAR MEDICINE PERFUSION SCAN. 8/21/2020 TECHNIQUE: 5.6 millicuries of Tc 34Q MAA was administered intravenously prior to planar imaging of the lungs in multiple projections. COMPARISON: Chest radiograph 08/21/2020.  HISTORY: ORDERING SYSTEM PROVIDED HISTORY: positive D-Dimer TECHNOLOGIST PROVIDED HISTORY: Reason for exam:->positive D-Dimer Reason for Exam: elevated d dimer Acuity: Unknown Type of Exam: Unknown FINDINGS: PERFUSION: Mildly heterogeneous distribution of radiotracer. No segmental or subsegmental perfusion defects. CHEST RADIOGRAPH: Mild bibasilar atelectasis. Very low probability for pulmonary embolism. Xr Chest Portable    Result Date: 8/22/2020  EXAMINATION: ONE XRAY VIEW OF THE CHEST 8/22/2020 4:25 pm COMPARISON: 08/21/2020 radiograph HISTORY: ORDERING SYSTEM PROVIDED HISTORY: fup shortness of breath TECHNOLOGIST PROVIDED HISTORY: Reason for exam:->fup shortness of breath Reason for Exam: fup shortness of breath Acuity: Unknown Type of Exam: Subsequent/Follow-up Additional signs and symptoms: none Relevant Medical/Surgical History: none FINDINGS: Borderline cardiomegaly. Mediastinum is normal.  Mild perihilar opacities are slightly increased centrally and there is a new bandlike opacity in the left lower lung zone. Otherwise, mild ground-glass opacities are stable bilaterally in the lung bases. No skeletal finding. Increasing perihilar opacities and new bandlike opacity in the left lower lobe. Pattern may represent asymmetric edema or developing pneumonitis. Xr Chest Portable    Result Date: 8/21/2020  EXAMINATION: ONE XRAY VIEW OF THE CHEST 8/21/2020 1:29 am COMPARISON: Chest single view August 8, 2020 HISTORY: ORDERING SYSTEM PROVIDED HISTORY: hypoxia TECHNOLOGIST PROVIDED HISTORY: Reason for exam:->hypoxia Reason for Exam: hypoxia Acuity: Acute Type of Exam: Initial Mechanism of Injury: hypoxia Relevant Medical/Surgical History: hypoxia FINDINGS: The heart is normal in size and configuration. The mediastinal contours are within normal limits. Bibasilar mild subsegmental atelectasis is present. Lungs are otherwise well aerated. The pleural surfaces are normal and no evidence of a pleural effusion is seen. Bones and soft tissues are unremarkable. Mild bibasilar subsegmental atelectasis.  Otherwise, unremarkable single upright portable AP view of the chest. Assessment and Plan:   Bao Parker is a 80 y.o.  male  who presents with Shortness of breath    Sepsis secondary to pneumonia versus sacral decubitus ulcer  Decubitus ulcer status post d dilation colostomy  Acute hypoxic respiratory failure  COVID-19 pneumonia  COVID-19 test positive on 8/21, 8/29, 9/1 and 9/8; intermediate result on 9/15  Blood cultures negative  ID recommendations appreciated, thinking chronic shedding of virus-currently off antibiotics  General surgery recommendations appreciated     Acute on chronic anemia  Continue monitor H&H    RONALDO on CKD stage III-resolved    Transaminitis-stable    Hypertension  Depression  COPD  Moderate PCM     Discussed with the case management regarding disposition plan. Diet DIET CARDIAC;  Dysphagia Minced and Moist; Mildly Thick (Nectar)  Dietary Nutrition Supplements: Standard High Calorie Oral Supplement   DVT Prophylaxis [x] Lovenox, []  Heparin, [] SCDs, [] Ambulation   GI Prophylaxis [] PPI,  [] H2 Blocker,  [] Carafate,  [x] Diet/Tube Feeds   Code Status Limited   Disposition Pending placement, medically stable   MDM [] Low, [x] Moderate,[]  High     Electronically signed by Curtis Nolan MD on 9/17/2020 at 12:38 PM

## 2020-09-18 LAB
BASOPHILS ABSOLUTE: 0.1 K/CU MM
BASOPHILS RELATIVE PERCENT: 0.7 % (ref 0–1)
DIFFERENTIAL TYPE: ABNORMAL
EOSINOPHILS ABSOLUTE: 0.3 K/CU MM
EOSINOPHILS RELATIVE PERCENT: 2.4 % (ref 0–3)
HCT VFR BLD CALC: 25.6 % (ref 42–52)
HEMOGLOBIN: 7.5 GM/DL (ref 13.5–18)
IMMATURE NEUTROPHIL %: 0.6 % (ref 0–0.43)
LYMPHOCYTES ABSOLUTE: 1.9 K/CU MM
LYMPHOCYTES RELATIVE PERCENT: 17.9 % (ref 24–44)
MCH RBC QN AUTO: 26 PG (ref 27–31)
MCHC RBC AUTO-ENTMCNC: 29.3 % (ref 32–36)
MCV RBC AUTO: 88.9 FL (ref 78–100)
MONOCYTES ABSOLUTE: 1.3 K/CU MM
MONOCYTES RELATIVE PERCENT: 12.2 % (ref 0–4)
NUCLEATED RBC %: 0 %
PDW BLD-RTO: 15.9 % (ref 11.7–14.9)
PLATELET # BLD: 227 K/CU MM (ref 140–440)
PMV BLD AUTO: 10.7 FL (ref 7.5–11.1)
RBC # BLD: 2.88 M/CU MM (ref 4.6–6.2)
SEGMENTED NEUTROPHILS ABSOLUTE COUNT: 7.1 K/CU MM
SEGMENTED NEUTROPHILS RELATIVE PERCENT: 66.2 % (ref 36–66)
TOTAL IMMATURE NEUTOROPHIL: 0.06 K/CU MM
TOTAL NUCLEATED RBC: 0 K/CU MM
WBC # BLD: 10.8 K/CU MM (ref 4–10.5)

## 2020-09-18 PROCEDURE — 85025 COMPLETE CBC W/AUTO DIFF WBC: CPT

## 2020-09-18 PROCEDURE — 97110 THERAPEUTIC EXERCISES: CPT

## 2020-09-18 PROCEDURE — 97606 NEG PRS WND THER DME>50 SQCM: CPT

## 2020-09-18 PROCEDURE — 6370000000 HC RX 637 (ALT 250 FOR IP)

## 2020-09-18 PROCEDURE — 36415 COLL VENOUS BLD VENIPUNCTURE: CPT

## 2020-09-18 PROCEDURE — 94150 VITAL CAPACITY TEST: CPT

## 2020-09-18 PROCEDURE — 6370000000 HC RX 637 (ALT 250 FOR IP): Performed by: INTERNAL MEDICINE

## 2020-09-18 PROCEDURE — 99231 SBSQ HOSP IP/OBS SF/LOW 25: CPT | Performed by: INTERNAL MEDICINE

## 2020-09-18 PROCEDURE — 1200000000 HC SEMI PRIVATE

## 2020-09-18 PROCEDURE — 99024 POSTOP FOLLOW-UP VISIT: CPT | Performed by: SURGERY

## 2020-09-18 PROCEDURE — 97530 THERAPEUTIC ACTIVITIES: CPT

## 2020-09-18 PROCEDURE — 94761 N-INVAS EAR/PLS OXIMETRY MLT: CPT

## 2020-09-18 PROCEDURE — 86140 C-REACTIVE PROTEIN: CPT

## 2020-09-18 PROCEDURE — 84145 PROCALCITONIN (PCT): CPT

## 2020-09-18 PROCEDURE — 6360000002 HC RX W HCPCS: Performed by: HOSPITALIST

## 2020-09-18 PROCEDURE — 2580000003 HC RX 258

## 2020-09-18 RX ORDER — DOCUSATE SODIUM 100 MG/1
100 CAPSULE, LIQUID FILLED ORAL 2 TIMES DAILY
Status: DISCONTINUED | OUTPATIENT
Start: 2020-09-18 | End: 2020-09-19 | Stop reason: HOSPADM

## 2020-09-18 RX ADMIN — ENOXAPARIN SODIUM 30 MG: 30 INJECTION SUBCUTANEOUS at 09:11

## 2020-09-18 RX ADMIN — QUETIAPINE FUMARATE 100 MG: 100 TABLET ORAL at 21:18

## 2020-09-18 RX ADMIN — PANTOPRAZOLE SODIUM 40 MG: 40 GRANULE, DELAYED RELEASE ORAL at 17:01

## 2020-09-18 RX ADMIN — METOPROLOL SUCCINATE 25 MG: 25 TABLET, EXTENDED RELEASE ORAL at 09:11

## 2020-09-18 RX ADMIN — FERROUS SULFATE TAB 325 MG (65 MG ELEMENTAL FE) 325 MG: 325 (65 FE) TAB at 09:11

## 2020-09-18 RX ADMIN — QUETIAPINE FUMARATE 100 MG: 100 TABLET ORAL at 09:11

## 2020-09-18 RX ADMIN — PANTOPRAZOLE SODIUM 40 MG: 40 GRANULE, DELAYED RELEASE ORAL at 09:11

## 2020-09-18 RX ADMIN — ENOXAPARIN SODIUM 30 MG: 30 INJECTION SUBCUTANEOUS at 21:18

## 2020-09-18 RX ADMIN — SODIUM CHLORIDE, PRESERVATIVE FREE 10 ML: 5 INJECTION INTRAVENOUS at 21:25

## 2020-09-18 RX ADMIN — SODIUM CHLORIDE, PRESERVATIVE FREE 10 ML: 5 INJECTION INTRAVENOUS at 21:19

## 2020-09-18 RX ADMIN — DOCUSATE SODIUM 100 MG: 100 CAPSULE, LIQUID FILLED ORAL at 21:18

## 2020-09-18 RX ADMIN — MIRTAZAPINE 45 MG: 15 TABLET, FILM COATED ORAL at 21:18

## 2020-09-18 RX ADMIN — FERROUS SULFATE TAB 325 MG (65 MG ELEMENTAL FE) 325 MG: 325 (65 FE) TAB at 17:01

## 2020-09-18 ASSESSMENT — PAIN SCALES - GENERAL
PAINLEVEL_OUTOF10: 0

## 2020-09-18 ASSESSMENT — PAIN SCALES - WONG BAKER

## 2020-09-18 NOTE — PROGRESS NOTES
Occupational Therapy    Occupational Therapy Treatment Note  Name: Evelin Elizabeth MRN: [id number] :   1932   Date:  2020   Admission Date: 2020 Room:  -A   Restrictions/Precautions:    General Precautions, Fall Risk, Droplet Plus (Covid-19), wound vac, colostomy  Communication with other providers:  RN, PT    Subjective:  Patient states: Agreeable to therapy. Pain: Reports pain at wound site, did not give numerical value. Moaned throughout tx session. Orientation: Oriented to self, disoriented to location and continues to report that he is at home despite prompting. Objective:    Observation: Pt received supine in bed with HOB elevated upon arrival.  Objective Measures: Vitals stable throughout session. Treatment, including education:  Therapeutic Activity Training:   Therapeutic activity training was instructed today. Cues were given for safety, sequence, UE/LE placement, awareness, and balance. Activities performed today included bed mobility training, sup-sit, sit-stand. Therapeutic Exercise:  Cues were given for technique, safety, recruitment, and rationale. Cues were verbal and/or tactile. Pt performed supine to seated bed mobility with MaxAx2 and assist for scooting toward EOB. Pt sat upright at EOB with fair sitting balance, demo intermittent posterior/R side lean and required CGA-MaxA throughout. Pt washed face while seated upright at EOB with setup, increased time, and VC throughout. Pt performed 3 STS from EOB with RW and ModAx2 on trial 1 and progressed to MinAx2 with final 2 trials. Pt returned to supine with MaxAx2 and assist for scooting toward HOB. Pt completed light exercises while supine with HOB elevated (shoulder flexion/elbow flexion 2x10) with VC for form throughout. Pt was left supine in bed with HOB elevated, alarm in place, all needs met.      Assessment / Impression:        Patient's tolerance of treatment:  Fair  Adverse Reaction: None  Significant change in status and impact:  None  Barriers to improvement:  Pain, Activity Tolerance for sitting upright, strength, endurance, cognition. Plan for Next Session:    Continue with POC    Time in:  1420  Time out:  1452  Timed treatment minutes:  32  Total treatment time:  32    Electronically signed by: Jessie Posey,   9/18/2020, 3:27 PM    Previously filed values:    Goals:  1. Pt will complete all aspects of bed mobility for EOB/OOB ADLs with ModA. 2. Pt will complete UB/LB bathing with ModA. 3. Pt will complete all aspects of LB dressing with ModA. 4. Pt will complete all functional transfers to and from bed, chair, toilet, shower chair with ModA and RW.   5. Pt will complete all aspects of toileting task with ModA. 6. Pt will complete oral hygiene/grooming routine with CGA.    7. Pt will complete ther ex/ther act with focus on UB strengthening.

## 2020-09-18 NOTE — CONSULTS
[facility] Continence Nurse  Consult Note       Evelin Elizabeth  AGE: 80 y.o. GENDER: male  : 1932  TODAY'S DATE:  2020    Subjective:     Reason for  Evaluation and Assessment: NPWT dressing change      Evelin Elizabeth is a 80 y.o. male referred by:   [x] Physician  [] Nursing  [] Other:     Wound Identification:  Wound Type: pressure  Contributing Factors: chronic pressure, decreased mobility, malnutrition and incontinence of stool        PAST MEDICAL HISTORY        Diagnosis Date    Anxiety     Depression     Hypertension     Nerves        PAST SURGICAL HISTORY    Past Surgical History:   Procedure Laterality Date    JOINT REPLACEMENT      right hip    LAPAROSCOPY N/A 9/10/2020    LAPAROSCOPY FOR DIVERTING COLOSTOMY performed by Mehreen San MD at  [facility] N/A 2020    EGD BIOPSY performed by Jaclyn Fitzgerald MD at [facility]    Family History   Problem Relation Age of Onset    Cancer Mother     Cancer Father     Cancer Sister        SOCIAL HISTORY    Social History     Tobacco Use    Smoking status: Former Smoker     Last attempt to quit: 1974     Years since quittin.8    Smokeless tobacco: Current User     Types: Chew   Substance Use Topics    Alcohol use: No     Comment: used to [employer] on weekends stopped 2 years ago    Drug use: No       ALLERGIES    No Known Allergies    MEDICATIONS    No current facility-administered medications on file prior to encounter. Current Outpatient Medications on File Prior to Encounter   Medication Sig Dispense Refill    pantoprazole (PROTONIX) 40 MG tablet Take 1 tablet by mouth 2 times daily (before meals) 30 tablet 3    furosemide (LASIX) 40 MG tablet Take 1 tablet by mouth daily for 3 days 3 tablet 0    cloNIDine (CATAPRES) 0.1 MG tablet Take 1 tablet by mouth every 6 hours as needed (for B/P systolic > 713).  60 tablet 3    QUEtiapine (SEROQUEL) 100 MG tablet Take 100 mg by mouth 2 times daily.  metoprolol (TOPROL-XL) 25 MG XL tablet Take 25 mg by mouth daily.  mirtazapine (REMERON) 45 MG tablet Take 45 mg by mouth nightly.              Objective:      /67   Pulse 96   Temp 98.6 °F (37 °C) (Oral)   Resp 23   Ht 5' 7.99\" (1.727 m)   Wt 153 lb (69.4 kg)   SpO2 93%   BMI 23.27 kg/m²   Boone Risk Score: Boone Scale Score: 11    LABS    CBC:   Lab Results   Component Value Date    WBC 9.8 09/16/2020    RBC 3.18 09/16/2020    HGB 8.3 09/16/2020    HCT 28.2 09/16/2020    MCV 88.7 09/16/2020    MCH 26.1 09/16/2020    MCHC 29.4 09/16/2020    RDW 15.2 09/16/2020     09/16/2020    MPV 9.8 09/16/2020     CMP:    Lab Results   Component Value Date     09/10/2020    K 5.0 09/10/2020     09/10/2020    CO2 26 09/10/2020    BUN 32 09/10/2020    CREATININE 1.4 09/10/2020    GFRAA 58 09/10/2020    LABGLOM 48 09/10/2020    GLUCOSE 125 09/10/2020    PROT 5.4 08/22/2020    PROT 6.4 10/24/2012    LABALBU 2.6 08/22/2020    CALCIUM 8.3 09/10/2020    BILITOT 0.5 08/22/2020    ALKPHOS 241 08/22/2020    AST 88 08/22/2020     08/22/2020     Albumin:    Lab Results   Component Value Date    LABALBU 2.6 08/22/2020     PT/INR:    Lab Results   Component Value Date    PROTIME 13.9 08/04/2020    INR 1.15 08/04/2020     HgBA1c:    Lab Results   Component Value Date    LABA1C 5.3 06/05/2013         Assessment:     Patient Active Problem List   Diagnosis    Pneumonia due to organism    ARF (acute respiratory failure) ([facility]    Essential hypertension, benign    Depressive disorder, not elsewhere classified    Acute renal failure ([facility] [facility]    Metabolic encephalopathy    SOB (shortness of breath)    Elevated liver enzymes    Acute respiratory failure (HCC)    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 No 09/18/20 1029   Dressing Status Changed 09/18/20 1029   Dressing Changed Changed/New 09/18/20 1029   Drainage Amount Moderate 09/18/20 1029   Drainage Description Serosanguinous; Serous 09/18/20 1029   Output (ml) 45 ml 09/16/20 1852   Wound Assessment Red;Yellow 09/18/20 1029   Kecia-wound Assessment [place] 09/18/20 1029   Odor None 09/18/20 1029   Number of days: 3       Wound 08/10/20 Sacrum and buttock (Active)   Wound Pressure Stage  4 09/18/20 1029   Dressing Status Changed 09/18/20 1029   Dressing Changed Changed/New 09/18/20 1029   Dressing/Treatment Vacuum dressing 09/18/20 1029   Wound Cleansed Rinsed/Irrigated with saline 09/18/20 1029   Dressing Change Due 09/18/20 09/16/20 0800   Wound Length (cm) 10 cm 09/15/20 0914   Wound Width (cm) 9.5 cm 09/15/20 0914   Wound Depth (cm) 3.5 cm 09/15/20 0914   Wound Surface Area (cm^2) 95 cm^2 09/15/20 0914   Change in Wound Size % (l*w) -5.56 09/15/20 0914   Wound Volume (cm^3) 332.5 cm^3 09/15/20 0914   Wound Healing % -3594 09/15/20 0914   Distance Tunneling (cm) 0 cm 09/15/20 0914   Tunneling Position ___ O'Clock 0 09/15/20 0914   Undermining Starts ___ O'Clock 0 09/15/20 0914   Undermining Ends___ O'Clock 0 09/15/20 0914   Undermining Maxium Distance (cm) 0 09/15/20 0914   Wound Assessment Red;Yellow 09/18/20 1029   Drainage Amount Moderate 09/18/20 1029   Drainage Description Serous; Serosanguinous 09/18/20 1029   Odor None 09/18/20 1029   Margins Defined edges 09/18/20 1029   Exposed structure Bone 09/14/20 1541   Kecia-wound Assessment Pink 09/18/20 1029   Non-staged Wound Description Full thickness 09/18/20 1029   [place]%Wound Bed 40 09/15/20 0914   Red%Wound Bed 40 09/15/20 0914 Yellow%Wound Bed 20 09/15/20 0914   Black%Wound Bed 0 09/15/20 0914   Purple%Wound Bed 0 09/15/20 0914   Other%Wound Bed 0 09/15/20 0914   Number of days: 38       Wound 09/15/20 Abdomen Left; Lower;Medial linda stoma (Active)   Wound Skin Tear 09/18/20 0746   Dressing Status Clean;Dry; Intact 09/18/20 0746   Dressing Changed Changed/New 09/16/20 0800   Dressing/Treatment Protective barrier 09/18/20 0746   Wound Cleansed Soap and water 09/16/20 0800   Wound Length (cm) 3.5 cm 09/15/20 0943   Wound Width (cm) 3 cm 09/15/20 0943   Wound Depth (cm) 0.1 cm 09/15/20 0943   Wound Surface Area (cm^2) 10.5 cm^2 09/15/20 0943   Wound Volume (cm^3) 1.05 cm^3 09/15/20 0943   Distance Tunneling (cm) 0 cm 09/15/20 0943   Tunneling Position ___ O'Clock 0 09/15/20 0943   Undermining Starts ___ O'Clock 00 09/15/20 0943   Undermining Ends___ O'Clock 0 09/15/20 0943   Undermining Maxium Distance (cm) 0 09/15/20 0943   Wound Assessment Pink 09/18/20 0746   Drainage Amount Small 09/18/20 0746   Drainage Description Serous 09/18/20 0746   Odor None 09/18/20 0746   Margins Defined edges 09/18/20 0746   Linda-wound Assessment Pink 09/18/20 0746   Non-staged Wound Description Partial thickness 09/15/20 0943   [place]%Wound Bed 40 09/15/20 0943   Red%Wound Bed 40 09/15/20 0943   Yellow%Wound Bed 0 09/15/20 0943   Black%Wound Bed 0 09/15/20 0943   Purple%Wound Bed 20 09/15/20 0943   Other%Wound Bed 0 09/15/20 0943   Number of days: 3       Response to treatment:  With complaints of pain. Pain Assessment:  Severity:  moderate  Quality of pain: sharp  Wound Pain Timing/Severity: with dressing change  Premedicated: no    Plan:     Plan of Care: Wound 08/10/20 Sacrum and buttock-Dressing/Treatment: Vacuum dressing  [REMOVED] Wound 08/21/20 Left lateral abdomen-Dressing/Treatment: Open to air  Incision 09/10/20 Abdomen-Dressing/Treatment: Open to air  Wound 09/15/20 Abdomen Left; Lower;Medial linda stoma-Dressing/Treatment: Protective barrier     Pt in bed. Agreeable to wound vac dressing change. Old dressing removed to sacrum. Small amount of stool from rectum. Kecia care done. Cleansed with NS. Duoderm and Aquacel AG applied to kecia wound. Stoma ring applied to separate from anus. 5 pieces black foam (3 to bridge to anterior left hip) applied followed by drape. Adequate seal obtained to 125 mmHg continuous suction. Heels intact. Colostomy appliance appears intact. Just changed per pt report. Pt positioned to right side with heel protector boots on. Pt is at high risk for skin breakdown AEB Boone. Follow Boone orders. Specialty Bed Required : yes  [x] Low Air Loss   [x] Pressure Redistribution  [] Fluid Immersion  [] Bariatric  [] Total Pressure Relief  [] Other:     Discharge Plan:  Placement for patient upon discharge: SNF  Hospice Care: no  Patient appropriate for Outpatient [facility] [address]: yes-followup with Dr. Gavin Pierce    Patient/Caregiver Teaching:  Level of patient/caregiver understanding able to:   Needs reinforcement.         Electronically signed by Jackie Lew RN,  on 9/18/2020 at 10:31 AM

## 2020-09-18 NOTE — PROGRESS NOTES
Hospitalist Progress Note      Name:  Darien Stone /Age/Sex: 1932  (80 y.o. male)   MRN & CSN:  [id number] & [id number] Admission Date/Time: 2020  1:17 AM   Location:  -A PCP: Mable Jackson MD         Hospital Day:     History of Present Illness:     Chief Complaint: Darien Stone is a 80 y.o.  male  who presents with hypoxia and shortness of breath     The patient seen and examined at bedside. He denies having any chest pain or shortness of breath. Ten point ROS reviewed negative, unless as noted above    Objective: Intake/Output Summary (Last 24 hours) at 2020 1250  Last data filed at 2020 0945  Gross per 24 hour   Intake 120 ml   Output 1600 ml   Net -1480 ml      Vitals:   Vitals:    20 0911   BP: 123/67   Pulse: 96   Resp:    Temp:    SpO2:      Physical Exam:   General Appearance: alert and awake. Not in distress  Cardiovascular: normal rate, regular rhythm, normal S1 and S2  Pulmonary/Chest: Decreased breath sounds bilaterally  Abdomen: soft, non-tender, non-distended, normal bowel sounds, no masses.   Colostomy  Extremities: no cyanosis, clubbing or edema, pulse   Skin: Ulcers as described  Head: normocephalic and atraumatic  Eyes: pupils equal, round, and reactive to light  Neck: supple and non-tender without mass, no thyromegaly   Musculoskeletal: normal range of motion, no joint swelling, deformity or tenderness  Neurological: alert, awake, normal speech, no focal findings or movement disorder noted    Medications:   Medications:    pantoprazole sodium  40 mg Oral BID AC    enoxaparin  30 mg Subcutaneous BID    lidocaine PF  5 mL Intradermal Once    sodium chloride flush  10 mL Intravenous 2 times per day    ferrous sulfate  325 mg Oral BID WC    sodium chloride flush  10 mL Intravenous 2 times per day    metoprolol succinate  25 mg Oral Daily    mirtazapine  45 mg Oral Nightly    QUEtiapine  100 mg Oral BID      Infusions:   PRN Meds: oxyCODONE, 5 mg, Q6H PRN  sodium chloride flush, 10 mL, PRN  traMADol, 50 mg, Q6H PRN  sodium chloride flush, 10 mL, PRN  acetaminophen, 650 mg, Q6H PRN    Or  acetaminophen, 650 mg, Q6H PRN  polyethylene glycol, 17 g, Daily PRN  promethazine, 12.5 mg, Q6H PRN    Or  ondansetron, 4 mg, Q6H PRN            Pertinent New Labs & Imaging Studies     CBC with Differential:    Lab Results   Component Value Date    WBC 9.8 09/16/2020    RBC 3.18 09/16/2020    HGB 8.3 09/16/2020    HCT 28.2 09/16/2020     09/16/2020    MCV 88.7 09/16/2020    MCH 26.1 09/16/2020    MCHC 29.4 09/16/2020    RDW 15.2 09/16/2020    SEGSPCT 69.3 09/16/2020    BANDSPCT 4 08/02/2020    LYMPHOPCT 16.9 09/16/2020    MONOPCT 10.7 09/16/2020    EOSPCT 5.3 11/11/2011    BASOPCT 0.4 09/16/2020    MONOSABS 1.1 09/16/2020    LYMPHSABS 1.7 09/16/2020    EOSABS 0.2 09/16/2020    BASOSABS 0.0 09/16/2020    DIFFTYPE AUTOMATED DIFFERENTIAL 09/16/2020     CMP:    Lab Results   Component Value Date     09/10/2020    K 5.0 09/10/2020     09/10/2020    CO2 26 09/10/2020    BUN 32 09/10/2020    CREATININE 1.4 09/10/2020    GFRAA 58 09/10/2020    LABGLOM 48 09/10/2020    GLUCOSE 125 09/10/2020    PROT 5.4 08/22/2020    PROT 6.4 10/24/2012    LABALBU 2.6 08/22/2020    CALCIUM 8.3 09/10/2020    BILITOT 0.5 08/22/2020    ALKPHOS 241 08/22/2020    AST 88 08/22/2020     08/22/2020     Nm Lung Scan Perfusion Only    Result Date: 8/21/2020  EXAMINATION: NUCLEAR MEDICINE PERFUSION SCAN. 8/21/2020 TECHNIQUE: 5.6 millicuries of Tc 76H MAA was administered intravenously prior to planar imaging of the lungs in multiple projections. COMPARISON: Chest radiograph 08/21/2020. HISTORY: ORDERING SYSTEM PROVIDED HISTORY: positive D-Dimer TECHNOLOGIST PROVIDED HISTORY: Reason for exam:->positive D-Dimer Reason for Exam: elevated d dimer Acuity: Unknown Type of Exam: Unknown FINDINGS: PERFUSION: Mildly heterogeneous distribution of radiotracer.   No breath    Sepsis secondary to pneumonia versus sacral decubitus ulcer  Decubitus ulcer status post d dilation colostomy  Acute hypoxic respiratory failure  COVID-19 pneumonia  COVID-19 test positive on 8/21, 8/29, 9/1 and 9/8; intermediate result on 9/15  Blood cultures negative  ID recommendations appreciated, thinking chronic shedding of virus-currently off antibiotics  General surgery recommendations appreciated     Acute on chronic anemia-stable    RONALDO on CKD stage III-resolved    Transaminitis-stable    Hypertension  Depression  COPD  Moderate PCM     Discussed with the case management regarding disposition plan. Diet DIET CARDIAC;  Dysphagia Minced and Moist; Mildly Thick ([place]ar)  Dietary Nutrition Supplements: Standard High Calorie Oral Supplement   DVT Prophylaxis [x] Lovenox, []  Heparin, [] SCDs, [] Ambulation   GI Prophylaxis [] PPI,  [] H2 Blocker,  [] Carafate,  [x] Diet/Tube Feeds   Code Status Limited   Disposition Pending placement-waiting for precert, medically stable   MDM [] Low, [x] Moderate,[]  High     Electronically signed by Radha Kennedy MD on 9/18/2020 at 12:50 PM

## 2020-09-18 NOTE — PROGRESS NOTES
Let lab know that this patient is a hard stick and needs to be drawn in the am. X2 nurses tried. Jennifer Alston from lab said she would let a phlebotomist know to come, but that they would not be able to come up until after 0700. Will pass this onto day shift.

## 2020-09-18 NOTE — PROGRESS NOTES
Infectious Disease Progress Note  2020   Patient Name: Colin Lemon : 1932       Reason for visit: F/u COVID-19, stage IV sacral decubitus ulcer ? History:? Interval history noted, s/p diverting colostomy on 9/10, to aid healing of a sacral wound  Dementia, denies any complaints. Afebrile  Physical Exam:  Vital Signs: /67   Pulse 96   Temp 98.6 °F (37 °C) (Oral)   Resp 23   Ht 5' 7.99\" (1.727 m)   Wt 153 lb (69.4 kg)   SpO2 93%   BMI 23.27 kg/m²     Gen: alert and oriented, memory deficits  Skin: no stigmata of endocarditis  Wounds: Stage IV sacral decubitus ulcer, with areas of necrosis   HEMT: AT/NC Oropharynx pink, moist, and without lesions or exudates; dentition in good state of repair  Eyes: PERRLA, EOMI, conjunctiva pink, sclera anicteric. Neck: Supple. Trachea midline. No LAD. Chest: no distress and CTA. Good air movement. Heart: RRR and no MRG. Abd: LLQ colostomy, soft, non-distended, no tenderness, no hepatomegaly. Normoactive bowel sounds. Ext: no clubbing, cyanosis, or edema  Catheter Site: without erythema or tenderness  Neuro: Mental status intact. CN 2-12 intact and no focal sensory or motor deficits     Radiologic / Imaging / TESTING  No results found. Labs:    No results found for this or any previous visit (from the past 24 hour(s)).   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 *      Impression and plan   Clinical status: stable, off oxygen   Therapeutic:   Diagnostic:    F/u: 9/1-blood cx ngtd    Other:  Summary: afebrile,  Repeat SARS-CoV-2 test positive on 8/21 and 8/29. SARS-CoV-2 PCR testing remains repeatedly positive. 9/9/2020 was positive. Not unusual in elderly men.s/p diverting colostomy on 9/10 to help healing of stage III sacral ulcer. Discharge planning. Will sign off and not follow the patient actively, please reconsult as needed.       Electronically signed by: Electronically signed by Raji Guardado MD on 9/18/2020 at 12:42 PM

## 2020-09-18 NOTE — PROGRESS NOTES
pain, prolonged hospital stay   Plan for Next Session:    Activity tolerance, strength, balance,transfers, bed mobility, ambulation if standing tolerance and balance improves.    Time in:  1420  Time out:  1452  Timed treatment minutes:  32  Total treatment time:  32    Previously filed items:     Short term goals  Time Frame for Short term goals: 1 week  Short term goal 1: Pt will perform sit><supine modA  Short term goal 2: Pt will transition sit><stand modA  Short term goal 3: Pt will transfer between surfaces modA  Short term goal 4: Pt will ambulate 10ft with RW modA  Short term goal 5: Pt will perform light sitting dynamic activity with no UE support x 3 minutes CGA       Electronically signed by:    Nidia Hoffman, [id number]  9/18/2020, 3:10 PM

## 2020-09-18 NOTE — PROGRESS NOTES
There appears to be some black necrotic tissue around stoma site. More on the right side than the left. And some sloughing. Pt is still having BM through rectum. Perfect serve sent to Dr. Bryan Sinclair.

## 2020-09-18 NOTE — CARE COORDINATION
Spoke with patient's nurse Kem Nash. She stated that patient is going to need a wound VAC. Phoned Demetrio Casas at [facility] to let her know and to leave a VM.

## 2020-09-18 NOTE — CARE COORDINATION
Phoned Radha Yeboah at [facility] to check on status of precert for patient to be able to return to SNF and had to leave a VM

## 2020-09-18 NOTE — CARE COORDINATION
Received return call from Demetrio Casas at [facility] . She stated that precert has been started and will let  know when approved.

## 2020-09-18 NOTE — PLAN OF CARE
Problem: Skin Integrity:  Goal: Will show no infection signs and symptoms  Description: Will show no infection signs and symptoms  9/18/2020 1058 by Kevin Jefferson RN  Outcome: Ongoing  9/18/2020 0045 by Los Foley RN  Outcome: Ongoing  Goal: Absence of new skin breakdown  Description: Absence of new skin breakdown  9/18/2020 1058 by Kevin Jefferson RN  Outcome: Ongoing  9/18/2020 0045 by Los Foley RN  Outcome: Ongoing     Problem: Airway Clearance - Ineffective  Goal: Achieve or maintain patent airway  9/18/2020 1058 by Kevin Jefferson RN  Outcome: Ongoing  9/18/2020 0045 by Los Foley RN  Outcome: Ongoing     Problem: Gas Exchange - Impaired  Goal: Absence of hypoxia  9/18/2020 1058 by Kevin Jefferson RN  Outcome: Ongoing  9/18/2020 0045 by Los Foley RN  Outcome: Ongoing  Goal: Promote optimal lung function  9/18/2020 1058 by Kevin Jefferson RN  Outcome: Ongoing  9/18/2020 0045 by Los Foley RN  Outcome: Ongoing     Problem: Breathing Pattern - Ineffective  Goal: Ability to achieve and maintain a regular respiratory rate  9/18/2020 1058 by Kevin Jefferson RN  Outcome: Ongoing  9/18/2020 0045 by Los Foley RN  Outcome: Ongoing     Problem:  Body Temperature -  Risk of, Imbalanced  Goal: Ability to maintain a body temperature within defined limits  9/18/2020 1058 by Kevin Jefferson RN  Outcome: Ongoing  9/18/2020 0045 by Los Foley RN  Outcome: Ongoing  Goal: Will regain or maintain usual level of consciousness  9/18/2020 1058 by Kevin Jefferson RN  Outcome: Ongoing  9/18/2020 0045 by Los Foley RN  Outcome: Ongoing  Goal: Complications related to the disease process, condition or treatment will be avoided or minimized  9/18/2020 1058 by Kevin Jefferson RN  Outcome: Ongoing  9/18/2020 0045 by Los Foley RN  Outcome: Ongoing     Problem: Isolation Precautions - Risk of Spread of Infection  Goal: Prevent transmission of infection  9/18/2020 1058 by Teodora Hernandez Cabrera Garvin RN  Outcome: Ongoing  9/18/2020 0045 by Taina Martinez RN  Outcome: Ongoing     Problem: Nutrition Deficits  Goal: Optimize nutrtional status  9/18/2020 1058 by Lissa Mcfarlane RN  Outcome: Ongoing  9/18/2020 0045 by Taina Martinez RN  Outcome: Ongoing     Problem: Risk for Fluid Volume Deficit  Goal: Maintain normal heart rhythm  9/18/2020 1058 by Lissa Mcfarlane RN  Outcome: Ongoing  9/18/2020 0045 by Taina Martinez RN  Outcome: Ongoing  Goal: Maintain absence of muscle cramping  9/18/2020 1058 by Lissa Mcfarlane RN  Outcome: Ongoing  9/18/2020 0045 by Taina Martinez RN  Outcome: Ongoing  Goal: Maintain normal serum potassium, sodium, calcium, phosphorus, and pH  9/18/2020 1058 by Lissa Mcfarlane RN  Outcome: Ongoing  9/18/2020 0045 by Taina Martinez RN  Outcome: Ongoing     Problem: Loneliness or Risk for Loneliness  Goal: Demonstrate positive use of time alone when socialization is not possible  9/18/2020 1058 by Lissa Mcfarlane RN  Outcome: Ongoing  9/18/2020 0045 by Taina Martinez RN  Outcome: Ongoing     Problem: Fatigue  Goal: Verbalize increase energy and improved vitality  9/18/2020 1058 by Lissa Mcfarlane RN  Outcome: Ongoing  9/18/2020 0045 by Taina Martinez RN  Outcome: Ongoing     Problem: Patient Education: Go to Patient Education Activity  Goal: Patient/Family Education  9/18/2020 1058 by Lissa Mcfarlane RN  Outcome: Ongoing  9/18/2020 0045 by Taina Martinez RN  Outcome: Ongoing     Problem: SAFETY  Goal: Free from accidental physical injury  9/18/2020 1058 by Lissa Mcfarlane RN  Outcome: Ongoing  9/18/2020 0045 by Taina Martinez RN  Outcome: Ongoing  Goal: Free from intentional harm  9/18/2020 1058 by Lissa Mcfarlane RN  Outcome: Ongoing  9/18/2020 0045 by Taina Martinez RN  Outcome: Ongoing     Problem: DAILY CARE  Goal: Daily care needs are met  9/18/2020 1058 by Azzie Denys, RN  Outcome: Ongoing  9/18/2020 0045 by Taina Martinez RN  Outcome: Ongoing     Problem: PAIN  Goal: Patient's pain/discomfort is manageable  9/18/2020 1058 by Concepción Parker RN  Outcome: Ongoing  9/18/2020 0045 by Wayne Roman RN  Outcome: Ongoing     Problem: SKIN INTEGRITY  Goal: Skin integrity is maintained or improved  9/18/2020 1058 by Concepción Parker RN  Outcome: Ongoing  9/18/2020 0045 by Wayne Roman RN  Outcome: Ongoing     Problem: KNOWLEDGE DEFICIT  Goal: Patient/S.O. demonstrates understanding of disease process, treatment plan, medications, and discharge instructions.   9/18/2020 1058 by Concepción Parker RN  Outcome: Ongoing  9/18/2020 0045 by Wayne Roman RN  Outcome: Ongoing     Problem: DISCHARGE BARRIERS  Goal: Patient's continuum of care needs are met  9/18/2020 1058 by Concepción Parker RN  Outcome: Ongoing  9/18/2020 0045 by Wayne Roman RN  Outcome: Ongoing     Problem: Pain:  Goal: Pain level will decrease  Description: Pain level will decrease  9/18/2020 1058 by Concepción Parker RN  Outcome: Ongoing  9/18/2020 0045 by Wayne Roman RN  Outcome: Ongoing  Goal: Control of acute pain  Description: Control of acute pain  9/18/2020 1058 by Concepción Parker RN  Outcome: Ongoing  9/18/2020 0045 by Wayne Roman RN  Outcome: Ongoing  Goal: Control of chronic pain  Description: Control of chronic pain  9/18/2020 1058 by Concepción Parker RN  Outcome: Ongoing  9/18/2020 0045 by Wayne Roman RN  Outcome: Ongoing

## 2020-09-18 NOTE — CARE COORDINATION
Spoke with patient's nurse Jordi Das. She will contact Hedy at [facility] to confirm wound care orders.

## 2020-09-18 NOTE — PROGRESS NOTES
Attempted to call Hunter Blank at [facility] to answer questions regarding wound vac. State they will call back when they are not busy.

## 2020-09-18 NOTE — PROGRESS NOTES
Daughter Julio Perrin called for update. Given at this time. States she would like to be called when pt is d/c.

## 2020-09-18 NOTE — CARE COORDINATION
Received VM from [address] [place] [address] at [facility] . She stated that precert has been approved. Not able to reach patient's nurse Be Necessary at this time so notified charge nurse Anish Tinsley 2E and notified physician.

## 2020-09-19 VITALS
DIASTOLIC BLOOD PRESSURE: 83 MMHG | HEIGHT: 68 IN | HEART RATE: 79 BPM | SYSTOLIC BLOOD PRESSURE: 101 MMHG | RESPIRATION RATE: 17 BRPM | TEMPERATURE: 98.2 F | BODY MASS INDEX: 23.19 KG/M2 | WEIGHT: 153 LBS | OXYGEN SATURATION: 97 %

## 2020-09-19 LAB
C-REACTIVE PROTEIN, HIGH SENSITIVITY: 70.9 MG/L
PROCALCITONIN: 0.17

## 2020-09-19 PROCEDURE — 6370000000 HC RX 637 (ALT 250 FOR IP): Performed by: INTERNAL MEDICINE

## 2020-09-19 PROCEDURE — 6370000000 HC RX 637 (ALT 250 FOR IP)

## 2020-09-19 PROCEDURE — 6360000002 HC RX W HCPCS: Performed by: HOSPITALIST

## 2020-09-19 RX ORDER — FERROUS SULFATE 325(65) MG
325 TABLET ORAL 2 TIMES DAILY WITH MEALS
Qty: 30 TABLET | Refills: 3 | Status: SHIPPED | OUTPATIENT
Start: 2020-09-19

## 2020-09-19 RX ORDER — PSEUDOEPHEDRINE HCL 30 MG
100 TABLET ORAL 2 TIMES DAILY
Qty: 30 CAPSULE | Refills: 1 | Status: SHIPPED | OUTPATIENT
Start: 2020-09-19

## 2020-09-19 RX ADMIN — PANTOPRAZOLE SODIUM 40 MG: 40 GRANULE, DELAYED RELEASE ORAL at 05:43

## 2020-09-19 RX ADMIN — DOCUSATE SODIUM 100 MG: 100 CAPSULE, LIQUID FILLED ORAL at 09:17

## 2020-09-19 RX ADMIN — QUETIAPINE FUMARATE 100 MG: 100 TABLET ORAL at 09:17

## 2020-09-19 RX ADMIN — ENOXAPARIN SODIUM 30 MG: 30 INJECTION SUBCUTANEOUS at 09:17

## 2020-09-19 RX ADMIN — METOPROLOL SUCCINATE 25 MG: 25 TABLET, EXTENDED RELEASE ORAL at 09:16

## 2020-09-19 RX ADMIN — FERROUS SULFATE TAB 325 MG (65 MG ELEMENTAL FE) 325 MG: 325 (65 FE) TAB at 09:16

## 2020-09-19 ASSESSMENT — PAIN SCALES - WONG BAKER
WONGBAKER_NUMERICALRESPONSE: 0

## 2020-09-19 ASSESSMENT — PAIN SCALES - GENERAL
PAINLEVEL_OUTOF10: 0

## 2020-09-19 ASSESSMENT — PAIN SCALES - PAIN ASSESSMENT IN ADVANCED DEMENTIA (PAINAD)
NEGVOCALIZATION: 0
FACIALEXPRESSION: 0
CONSOLABILITY: 0
BREATHING: 0
BODYLANGUAGE: 0
TOTALSCORE: 0

## 2020-09-19 NOTE — PROGRESS NOTES
Comprehensive Nutrition Assessment    Type and Reason for Visit:  Reassess    Nutrition Recommendations/Plan:     Continue diet and supplements as ordered. Please encourage intake of meals TID. Please weigh pt as able, recorded with significant seemingly unexplained weight loss. Nutrition Assessment:  Pt with variable intake from 1-100% but most meal intakes recorded greater than 50% of meals. Will continue diet and supplements as ordered at this time. Malnutrition Assessment:  Malnutrition Status: Moderate malnutrition    Context:  Acute Illness     Findings of the 6 clinical characteristics of malnutrition:  Energy Intake:  1 - 75% or less of estimated energy requirements for 7 or more days  Weight Loss:  7 - Greater than 2% over 1 week     Body Fat Loss:  Unable to assess     Muscle Mass Loss:  Unable to assess    Fluid Accumulation:  Unable to assess     Strength:  Not Performed    Estimated Daily Nutrient Needs:  Energy (kcal):  8220-2237(25-30 kcal/kg IBW); Weight Used for Energy Requirements:  Ideal     Protein (g):  105+(1.5 g/kg IBW ); Weight Used for Protein Requirements:  Ideal        Fluid (ml/day):  1 mL/kcal ; Weight Used for Fluid Requirements:  [place]      Nutrition Related Findings:  ? weight hx during los, recorded down 20% from admission weight and usual body weight, ? accuracy vs. fluid vs. significant poor oral intake with increased calorie needs 2/2 acute illness and wounds vs. Combination of above. Wounds:  Multiple, Stage III, Stage IV, Pressure Injury, Wound Vac       Current Nutrition Therapies:    DIET CARDIAC;  Dysphagia Minced and Moist; Mildly Thick (Nectar)  Dietary Nutrition Supplements: Standard High Calorie Oral Supplement    Anthropometric Measures:  · Height: 5' 7.99\" (172.7 cm)  · Current Body Weight: 153 lb (69.4 kg)   · Admission Body Weight: 197 lb 8.5 oz (89.6 kg)    · Usual Body Weight: 196 lb (88.9 kg)     · Ideal Body Weight: 154 lbs; % Ideal Body Weight 117.5 %   · BMI: 23.3  · BMI Categories: Normal Weight (BMI 22.0 to 24.9) age over 72       Nutrition Diagnosis:   · Inadequate energy intake related to acute injury/trauma as evidenced by intake 51-75%      Nutrition Interventions:   Food and/or Nutrient Delivery:  Continue Oral Nutrition Supplement, Continue Current Diet  Nutrition Education/Counseling:  No recommendation at this time   Coordination of Nutrition Care:  Continued Inpatient Monitoring    Goals:  pt will tolerate greater than 75% intake of meals and supplements during los        Nutrition Monitoring and Evaluation:   Food/Nutrient Intake Outcomes:  Supplement Intake, Food and Nutrient Intake  Physical Signs/Symptoms Outcomes:  Biochemical Data, Skin, Weight, GI Status, Nutrition Focused Physical Findings     Discharge Planning:     Too soon to determine     Electronically signed by Betty Ross RD, LD on 9/18/20 at 9:40 PM EDT    Contact: [phone]

## 2020-09-19 NOTE — PROGRESS NOTES
Report called to [facility] at [place]. All questions answered and notified of med trans  at 1430.

## 2020-09-19 NOTE — PROGRESS NOTES
Pt is able to go back to the nursing home but they will not have a wound vac available until Tuesday. They are asking if he can have a wet-to-dry until Tuesday. Perfect serve sent to Dr. Karel Westfall. He states okay for wet-to-dry until Tuesday.

## 2020-09-19 NOTE — PROGRESS NOTES
Attempted to call report to Lucrecia Jacobsen at [address] [place] [address]. They are not sure they can take a COVID positive patient at this time. They are calling the DON at [address] [place] [address]. State they will call back. Called Shelbie CARRANZA at this time to notify. She states she will call Hedy at [address] [place] [address] to find out what needs to be done.

## 2020-09-19 NOTE — CARE COORDINATION
Recieved call from [address] [place], [place] [address]e whom reports that the nurse at [address] states they can not accept the pt d/t testing 9/15 was indeterminate. CM spoke w/ Hedy at , they are following CDC guideline, pt did test pos, but was in hospital >20 days. Michealmac Ksenia stated she will call nursing at . CM gave updates to JAYNA Livingston and instructed that a rapid covid is NOT needed.

## 2020-09-19 NOTE — PROGRESS NOTES
Pt d/c home via med [employer]. Wound Vac removed at this time and a wet-to-dry dressing placed. Pt d/c with carline. All paperwork given to med-trans.

## 2020-09-19 NOTE — DISCHARGE INSTR - DIET

## 2020-09-19 NOTE — PROGRESS NOTES
Night Shift Rn Notes:  Pressure Ulcer on Sacrum to Wound Vac  maintained adequate seal to 125 mmHg continuous suction and draining serosanguinous output. Heels intact and propped up/off to bed. Colostomy appliance was changed during the night shift. Greene cath was drined with svitlana colored urine. I/O last 3 completed shifts:   In: 290 [P.O.:290]  Out: 1910 [Urine:1450; Stool:460]  I/O this shift:  In: 100 [P.O.:100]  Out: 520 [Urine:400; Drains:100; Stool:20]

## 2020-10-01 NOTE — PLAN OF CARE
[facility] Problem: Skin Integrity:  Goal: Will show no infection signs and symptoms  Description: Will show no infection signs and symptoms  Outcome: Ongoing  Goal: Absence of new skin breakdown  Description: Absence of new skin breakdown  Outcome: Ongoing     Problem: Airway Clearance - Ineffective  Goal: Achieve or maintain patent airway  Outcome: Ongoing     Problem: Gas Exchange - Impaired  Goal: Absence of hypoxia  Outcome: Ongoing  Goal: Promote optimal lung function  Outcome: Ongoing     Problem: Breathing Pattern - Ineffective  Goal: Ability to achieve and maintain a regular respiratory rate  Outcome: Ongoing     Problem:  Body Temperature -  Risk of, Imbalanced  Goal: Ability to maintain a body temperature within defined limits  Outcome: Ongoing  Goal: Will regain or maintain usual level of consciousness  Outcome: Ongoing  Goal: Complications related to the disease process, condition or treatment will be avoided or minimized  Outcome: Ongoing     Problem: Isolation Precautions - Risk of Spread of Infection  Goal: Prevent transmission of infection  Outcome: Ongoing     Problem: Nutrition Deficits  Goal: Optimize nutrtional status  Outcome: Ongoing     Problem: Risk for Fluid Volume Deficit  Goal: Maintain normal heart rhythm  Outcome: Ongoing  Goal: Maintain absence of muscle cramping  Outcome: Ongoing  Goal: Maintain normal serum potassium, sodium, calcium, phosphorus, and pH  Outcome: Ongoing     Problem: Loneliness or Risk for Loneliness  Goal: Demonstrate positive use of time alone when socialization is not possible  Outcome: Ongoing     Problem: Fatigue  Goal: Verbalize increase energy and improved vitality  Outcome: Ongoing     Problem: Patient Education: Go to Patient Education Activity  Goal: Patient/Family Education  Outcome: Ongoing     Problem: SAFETY  Goal: Free from accidental physical injury  Outcome: Ongoing  Goal: Free from intentional harm  Outcome: Ongoing     Problem: DAILY CARE  Goal: Daily care needs are met  Outcome: Ongoing     Problem: PAIN  Goal: Patient's pain/discomfort is manageable  Outcome: Ongoing     Problem: DISCHARGE BARRIERS  Goal: Patient's continuum of care needs are met  Outcome: Ongoing     Problem: Pain:  Goal: Pain level will decrease  Description: Pain level will decrease  Outcome: Ongoing  Goal: Control of acute pain  Description: Control of acute pain  Outcome: Ongoing  Goal: Control of chronic pain  Description: Control of chronic pain  Outcome: Ongoing     Problem: KNOWLEDGE DEFICIT  Goal: Patient/S.O. demonstrates understanding of disease process, treatment plan, medications, and discharge instructions.   Outcome: Ongoing

## 2020-10-08 ENCOUNTER — VIRTUAL VISIT (OUTPATIENT)
Dept: SURGERY | Age: 85
End: 2020-10-08

## 2020-10-08 PROCEDURE — 99024 POSTOP FOLLOW-UP VISIT: CPT | Performed by: PHYSICIAN ASSISTANT

## 2020-10-08 PROCEDURE — APPNB30 APP NON BILLABLE TIME 0-30 MINS: Performed by: PHYSICIAN ASSISTANT

## 2020-10-12 ENCOUNTER — TELEPHONE (OUTPATIENT)
Dept: BARIATRICS/WEIGHT MGMT | Age: 85
End: 2020-10-12

## 2020-10-12 NOTE — TELEPHONE ENCOUNTER
----- Message from David Bryant PA-C sent at 10/12/2020  3:16 PM EDT -----  Regarding: [facility] RN Manager  [place], just wanted to check in again and see if we could get ahold of the RN manager at [facility] about this patient. If not, we will need to bring him in the office. Thank you.

## 2020-10-12 NOTE — PROGRESS NOTES
Linda Post is a 80 y.o. male evaluated via telephone on 10/8/2020. All discussion was with his primary RN at [facility] as the pt has dementia. Consent:  He and/or health care decision maker is aware that that he may receive a bill for this telephone service, depending on his insurance coverage, and has provided verbal consent to proceed: No - Not billable      Documentation:  I communicated with the patient and/or health care decision maker about how things are going post-op. Details of this discussion including any medical advice provided:     SUBJECTIVE:  Patient's RN at his NH provided information for me regarding the patient for his 1st post op visit following robotic assisted diverting loop colostomy placement. Pt reports that pain is well controlled with PRN analgesia. Pt is  tolerating a regular diet. BMs are WNL via stoma. RN denies any problems with the stoma appliance, and states that there is no drainage. The appliance is changed about every other day. The stoma bridge is still in place. Incisions: min bruising and no discharge. Minimal residual glue intact. Pt has been seen by a wound RN at [facility], who has been treating his decubitus wound. The wound vac has been d/c'd and wet to dry dressings have begun. The RN reported that she would have the RN  me about wound care and possible removal of the bridge at [facility] - If this is unable to be done, or we cannot get in touch with her, we will need to see him in the office for wound check and bridge removal.     I do not affirm (not billable) this is a Patient Initiated Episode with a Patient who has not had a related appointment within my department in the past 7 days or scheduled within the next 24 hours. Patient identification was verified at the start of the visit: Yes    Total Time: Not billable - Global period.        Evangelist Santana PA-C

## 2020-10-12 NOTE — TELEPHONE ENCOUNTER
Spoke to [address] again. They will have nurse or unit  back.  If no call by 10/13/20 at 4pm make pt appt to come in to see jose on monday

## 2020-10-15 ENCOUNTER — TELEPHONE (OUTPATIENT)
Dept: BARIATRICS/WEIGHT MGMT | Age: 85
End: 2020-10-15

## 2020-10-15 NOTE — TELEPHONE ENCOUNTER
Discussed with RN manager from [facility]. She has been monitoring the patient's decubitus wound. Reports significant improvement. She did let me know that there is a wound physician that comes in weekly to evaluate and manage the wound. I also spoke to the bedside RN, who attempted to remove the loop colostomy bridge, but was unable to see it. It is possible that the bridge dislodged on its own. I did request that the wound physician evaluate and remove the bridge if it is still present. Pt is on a covid positive unit, and ideally will not need an office visit. If there are concerns or questions, we can discuss with our  and facilitate an in office visit if needed.      Evangelist Santana PA-C

## 2023-10-27 NOTE — PROGRESS NOTES
Food/Nutrient Intake Outcomes:  Supplement Intake, Food and Nutrient Intake  Physical Signs/Symptoms Outcomes:  Biochemical Data, Skin, Weight, GI Status, Nutrition Focused Physical Findings     Discharge Planning:     Too soon to determine     Electronically signed by Jaden Medina RD, LD on 2/60/88 at 10:56 AM EDT    Contact: [phone] Double O-Z Plasty Text: The defect edges were debeveled with a #15 scalpel blade.  Given the location of the defect, shape of the defect and the proximity to free margins a Double O-Z plasty (double transposition flap) was deemed most appropriate.  Using a sterile surgical marker, the appropriate transposition flaps were drawn incorporating the defect and placing the expected incisions within the relaxed skin tension lines where possible. The area thus outlined was incised deep to adipose tissue with a #15 scalpel blade.  The skin margins were undermined to an appropriate distance in all directions utilizing iris scissors.  Hemostasis was achieved with electrocautery.  The flaps were then transposed into place, one clockwise and the other counterclockwise, and anchored with interrupted buried subcutaneous sutures.

## 2023-11-15 NOTE — PROGRESS NOTES
Detail Level: Detailed Detail Level: Zone Mouth/Throat:      Mouth: Mucous membranes are moist.   Eyes:      Extraocular Movements: Extraocular movements intact. Cardiovascular:      Rate and Rhythm: Normal rate. Pulmonary:      Effort: Pulmonary effort is normal.   Abdominal:      General: Abdomen is flat. Palpations: Abdomen is soft. Comments: Ostomy pink with some edema, bridge in place, serosanguinous output in the bag, no flatus or stool. Incisions c/d/i   Musculoskeletal: Normal range of motion. Skin:     General: Skin is warm. Neurological:      Mental Status: He is alert. Mental status is at baseline. He is disoriented.            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

## 2024-03-21 NOTE — PROGRESS NOTES
Nutrition Assessment     Type and Reason for Visit: Initial(los)    Nutrition Recommendations/Plan:   Continue current diet  Begin frozen oral nutrition supplement bid     Nutrition Assessment:  Admit with PNA. Adequate intake on General Diet/mildly thick liquids with aspiration precautions per SLP. Consuming greater than half of meals and feeding self. No reported nor noted recent wt loss. Excoriation to buttocks noted. Will order frozen oral supplement to optimize intake dos.     Malnutrition Assessment:  Malnutrition Status:  No malnutrition    Current Nutrition Therapies:    DIET GENERAL; Mildly Thick (Nectar)    Anthropometric Measures:  · Height: 5' 8\" (172.7 cm)  · Current Body Wt: 196 lb 11.2 oz (89.2 kg)   · BMI: 29.9    Nutrition Diagnosis:   · Predicted inadequate energy intake related to swallowing difficulty as evidenced by swallow study results    Nutrition Interventions:   Food and/or Nutrient Delivery:  Continue Current Diet, Start Oral Nutrition Supplement  Nutrition Education/Counseling:  Education initiated   Coordination of Nutrition Care:  Continued Inpatient Monitoring    Goals:  Pt will consume greater than 50% of meals and supplements dos       Nutrition Monitoring and Evaluation:   Food/Nutrient Intake Outcomes:  Food and Nutrient Intake, Supplement Intake  Physical Signs/Symptoms Outcomes:  Biochemical Data, Constipation, GI Status, Weight     Discharge Planning:    No discharge needs at this time     Electronically signed by Tereza Slaughter RD, LD on 8/7/20 at 1:05 PM EDT    Contact: [phone] [address]

## (undated) DEVICE — SOLUTION IV IRRIG POUR BRL 0.9% SODIUM CHL 2F7124

## (undated) DEVICE — MASTISOL ADHESIVE LIQ 2/3ML

## (undated) DEVICE — CATHETER,URETHRAL,REDRUBBER,STRL,16FR: Brand: MEDLINE

## (undated) DEVICE — FORCEPS BX L240CM JAW DIA2.8MM L CAP W/ NDL MIC MESH TOOTH

## (undated) DEVICE — ELECTRODE ES AD CRDLSS PT RET REM POLYHESIVE

## (undated) DEVICE — TOWEL,OR,DSP,ST,BLUE,STD,6/PK,12PK/CS: Brand: MEDLINE

## (undated) DEVICE — ELECTRODE ES L2.75IN S STL INSUL BLDE W/ SL EDGE

## (undated) DEVICE — TROCAR: Brand: KII FIOS FIRST ENTRY

## (undated) DEVICE — GLOVE ORANGE PI 7   MSG9070

## (undated) DEVICE — TROCAR: Brand: KII® SLEEVE

## (undated) DEVICE — Device: Brand: SENSURA MIO

## (undated) DEVICE — Z DISCONTINUED USE 2271144 DRAIN SURG W0.25XL18IN FLAT GRAV FOR OPN WND PENROSE

## (undated) DEVICE — GOWN,SIRUS,POLYRNF,BRTHSLV,XLN/XL,20/CS: Brand: MEDLINE

## (undated) DEVICE — GLOVE SURG SZ 6 THK91MIL LTX FREE SYN POLYISOPRENE ANTI

## (undated) DEVICE — PENCIL ES CRD L10FT HND SWCHING ROCK SWCH W/ EDGE COAT BLDE

## (undated) DEVICE — Z DISCONTINUED (USE MFG CAT MVABO)  TUBING GAS SAMPLING STD 6.5 FT FEMALE CONN SMRT CAPNOLINE

## (undated) DEVICE — ADHESIVE SKIN CLSR 0.7ML TOP DERMBND ADV

## (undated) DEVICE — DRAPE SHEET ULTRAGARD: Brand: MEDLINE

## (undated) DEVICE — INTENDED FOR TISSUE SEPARATION, AND OTHER PROCEDURES THAT REQUIRE A SHARP SURGICAL BLADE TO PUNCTURE OR CUT.: Brand: BARD-PARKER ® STAINLESS STEEL BLADES

## (undated) DEVICE — APPLICATOR MEDICATED 26 CC SOLUTION HI LT ORNG CHLORAPREP

## (undated) DEVICE — SEALER ENDOSCP L37CM NANO COAT BLNT TIP LAP DIV

## (undated) DEVICE — TUBING INSUFFLATOR HEAT HI FLO SET PNEUMOCLEAR

## (undated) DEVICE — SUTURE VCRL SZ 3-0 L18IN ABSRB UD L26MM SH 1/2 CIR J864D

## (undated) DEVICE — FIRST ENTRY KIOS THRD 5X100MM ST

## (undated) DEVICE — BLADE CLIPPER GEN PURP NS